# Patient Record
Sex: FEMALE | Race: WHITE | NOT HISPANIC OR LATINO | Employment: UNEMPLOYED | URBAN - METROPOLITAN AREA
[De-identification: names, ages, dates, MRNs, and addresses within clinical notes are randomized per-mention and may not be internally consistent; named-entity substitution may affect disease eponyms.]

---

## 2018-04-01 ENCOUNTER — TRANSFERRED RECORDS (OUTPATIENT)
Dept: HEALTH INFORMATION MANAGEMENT | Facility: CLINIC | Age: 38
End: 2018-04-01

## 2018-04-01 LAB — PAP SMEAR - HIM PATIENT REPORTED: NEGATIVE

## 2021-01-07 ENCOUNTER — TRANSFERRED RECORDS (OUTPATIENT)
Dept: HEALTH INFORMATION MANAGEMENT | Facility: CLINIC | Age: 41
End: 2021-01-07

## 2021-03-09 ENCOUNTER — OFFICE VISIT (OUTPATIENT)
Dept: INTERNAL MEDICINE | Facility: CLINIC | Age: 41
End: 2021-03-09
Payer: COMMERCIAL

## 2021-03-09 VITALS
WEIGHT: 147 LBS | HEART RATE: 72 BPM | DIASTOLIC BLOOD PRESSURE: 62 MMHG | OXYGEN SATURATION: 99 % | SYSTOLIC BLOOD PRESSURE: 96 MMHG | TEMPERATURE: 98.1 F | RESPIRATION RATE: 16 BRPM | HEIGHT: 67 IN | BODY MASS INDEX: 23.07 KG/M2

## 2021-03-09 DIAGNOSIS — G47.00 INSOMNIA, UNSPECIFIED TYPE: ICD-10-CM

## 2021-03-09 DIAGNOSIS — Z83.3 FAMILY HISTORY OF DIABETES MELLITUS: ICD-10-CM

## 2021-03-09 DIAGNOSIS — F33.0 MILD EPISODE OF RECURRENT MAJOR DEPRESSIVE DISORDER (H): ICD-10-CM

## 2021-03-09 DIAGNOSIS — Z11.4 ENCOUNTER FOR SCREENING FOR HIV: ICD-10-CM

## 2021-03-09 DIAGNOSIS — Z11.59 NEED FOR HEPATITIS C SCREENING TEST: ICD-10-CM

## 2021-03-09 DIAGNOSIS — Z00.00 ROUTINE GENERAL MEDICAL EXAMINATION AT A HEALTH CARE FACILITY: Primary | ICD-10-CM

## 2021-03-09 DIAGNOSIS — F41.9 ANXIETY: ICD-10-CM

## 2021-03-09 LAB
ALBUMIN SERPL-MCNC: 3.8 G/DL (ref 3.4–5)
ALP SERPL-CCNC: 49 U/L (ref 40–150)
ALT SERPL W P-5'-P-CCNC: 16 U/L (ref 0–50)
ANION GAP SERPL CALCULATED.3IONS-SCNC: 4 MMOL/L (ref 3–14)
AST SERPL W P-5'-P-CCNC: 15 U/L (ref 0–45)
BASOPHILS # BLD AUTO: 0 10E9/L (ref 0–0.2)
BASOPHILS NFR BLD AUTO: 0.7 %
BILIRUB SERPL-MCNC: 0.5 MG/DL (ref 0.2–1.3)
BUN SERPL-MCNC: 7 MG/DL (ref 7–30)
CALCIUM SERPL-MCNC: 9.1 MG/DL (ref 8.5–10.1)
CHLORIDE SERPL-SCNC: 107 MMOL/L (ref 94–109)
CO2 SERPL-SCNC: 27 MMOL/L (ref 20–32)
CREAT SERPL-MCNC: 0.77 MG/DL (ref 0.52–1.04)
DEPRECATED CALCIDIOL+CALCIFEROL SERPL-MC: 26 UG/L (ref 20–75)
DIFFERENTIAL METHOD BLD: ABNORMAL
EOSINOPHIL # BLD AUTO: 0.2 10E9/L (ref 0–0.7)
EOSINOPHIL NFR BLD AUTO: 4.8 %
ERYTHROCYTE [DISTWIDTH] IN BLOOD BY AUTOMATED COUNT: 11.9 % (ref 10–15)
GFR SERPL CREATININE-BSD FRML MDRD: >90 ML/MIN/{1.73_M2}
GLUCOSE SERPL-MCNC: 95 MG/DL (ref 70–99)
HBA1C MFR BLD: 5.1 % (ref 0–5.6)
HCT VFR BLD AUTO: 36.3 % (ref 35–47)
HCV AB SERPL QL IA: NONREACTIVE
HGB BLD-MCNC: 11.6 G/DL (ref 11.7–15.7)
HIV 1+2 AB+HIV1 P24 AG SERPL QL IA: NONREACTIVE
LYMPHOCYTES # BLD AUTO: 1.5 10E9/L (ref 0.8–5.3)
LYMPHOCYTES NFR BLD AUTO: 35.4 %
MCH RBC QN AUTO: 35.7 PG (ref 26.5–33)
MCHC RBC AUTO-ENTMCNC: 32 G/DL (ref 31.5–36.5)
MCV RBC AUTO: 112 FL (ref 78–100)
MONOCYTES # BLD AUTO: 0.4 10E9/L (ref 0–1.3)
MONOCYTES NFR BLD AUTO: 10.5 %
NEUTROPHILS # BLD AUTO: 2 10E9/L (ref 1.6–8.3)
NEUTROPHILS NFR BLD AUTO: 48.6 %
PLATELET # BLD AUTO: 248 10E9/L (ref 150–450)
POTASSIUM SERPL-SCNC: 3.7 MMOL/L (ref 3.4–5.3)
PROT SERPL-MCNC: 7.3 G/DL (ref 6.8–8.8)
RBC # BLD AUTO: 3.25 10E12/L (ref 3.8–5.2)
SODIUM SERPL-SCNC: 138 MMOL/L (ref 133–144)
TSH SERPL DL<=0.005 MIU/L-ACNC: 1.66 MU/L (ref 0.4–4)
WBC # BLD AUTO: 4.2 10E9/L (ref 4–11)

## 2021-03-09 PROCEDURE — 87389 HIV-1 AG W/HIV-1&-2 AB AG IA: CPT | Performed by: NURSE PRACTITIONER

## 2021-03-09 PROCEDURE — 80050 GENERAL HEALTH PANEL: CPT | Performed by: NURSE PRACTITIONER

## 2021-03-09 PROCEDURE — 82306 VITAMIN D 25 HYDROXY: CPT | Performed by: NURSE PRACTITIONER

## 2021-03-09 PROCEDURE — 86803 HEPATITIS C AB TEST: CPT | Performed by: NURSE PRACTITIONER

## 2021-03-09 PROCEDURE — 99385 PREV VISIT NEW AGE 18-39: CPT | Performed by: NURSE PRACTITIONER

## 2021-03-09 PROCEDURE — 36415 COLL VENOUS BLD VENIPUNCTURE: CPT | Performed by: NURSE PRACTITIONER

## 2021-03-09 PROCEDURE — 83036 HEMOGLOBIN GLYCOSYLATED A1C: CPT | Performed by: NURSE PRACTITIONER

## 2021-03-09 RX ORDER — ZOLPIDEM TARTRATE 10 MG/1
10 TABLET ORAL
COMMUNITY
Start: 2020-11-20 | End: 2021-04-13

## 2021-03-09 RX ORDER — ZOLPIDEM TARTRATE 10 MG/1
10 TABLET ORAL
Qty: 30 TABLET | Refills: 5 | Status: SHIPPED | OUTPATIENT
Start: 2021-03-09 | End: 2021-09-21

## 2021-03-09 SDOH — HEALTH STABILITY: MENTAL HEALTH: HOW MANY STANDARD DRINKS CONTAINING ALCOHOL DO YOU HAVE ON A TYPICAL DAY?: NOT ASKED

## 2021-03-09 SDOH — HEALTH STABILITY: MENTAL HEALTH: HOW OFTEN DO YOU HAVE 6 OR MORE DRINKS ON ONE OCCASION?: NEVER

## 2021-03-09 SDOH — HEALTH STABILITY: MENTAL HEALTH: HOW OFTEN DO YOU HAVE A DRINK CONTAINING ALCOHOL?: NEVER

## 2021-03-09 ASSESSMENT — ENCOUNTER SYMPTOMS: BREAST MASS: 0

## 2021-03-09 ASSESSMENT — MIFFLIN-ST. JEOR: SCORE: 1374.42

## 2021-03-09 ASSESSMENT — PATIENT HEALTH QUESTIONNAIRE - PHQ9
SUM OF ALL RESPONSES TO PHQ QUESTIONS 1-9: 11
SUM OF ALL RESPONSES TO PHQ QUESTIONS 1-9: 11
10. IF YOU CHECKED OFF ANY PROBLEMS, HOW DIFFICULT HAVE THESE PROBLEMS MADE IT FOR YOU TO DO YOUR WORK, TAKE CARE OF THINGS AT HOME, OR GET ALONG WITH OTHER PEOPLE: SOMEWHAT DIFFICULT

## 2021-03-09 NOTE — PROGRESS NOTES
SUBJECTIVE:   CC: Humera Lovell is an 40 year old woman who presents for preventive health visit.       Patient has been advised of split billing requirements and indicates understanding: Yes  Healthy Habits:     Getting at least 3 servings of Calcium per day:  NO    Bi-annual eye exam:  Yes    Dental care twice a year:  Yes    Sleep apnea or symptoms of sleep apnea:  None    Diet:  Regular (no restrictions)    Frequency of exercise:  2-3 days/week    Duration of exercise:  15-30 minutes    Taking medications regularly:  Not Applicable    Medication side effects:  None    PHQ-2 Total Score: 4      Pap smear done on this date: about two years ago .(approximately), by this group: [facility] , results were neg .   4/2018- PAP normal - positive for non 16-18 HPV on review of her my chart records         Today's PHQ-2 Score:   PHQ-2 ( 1999 [employer]) 3/9/2021   Q1: Little interest or pleasure in doing things 2   Q2: Feeling down, depressed or hopeless 2   PHQ-2 Score 4   Q1: Little interest or pleasure in doing things More than half the days   Q2: Feeling down, depressed or hopeless More than half the days   PHQ-2 Score 4       Abuse: Current or Past (Physical, Sexual or Emotional) - NA  Do you feel safe in your environment? Yes    Have you ever done Advance Care Planning? (For example, a Health Directive, POLST, or a discussion with a medical provider or your loved ones about your wishes): No, advance care planning information given to patient to review.  Patient declined advance care planning discussion at this time.    Social History     Tobacco Use     Smoking status: Never Smoker     Smokeless tobacco: Never Used   Substance Use Topics     Alcohol use: Never     Frequency: Never     Binge frequency: Never         Alcohol Use 3/9/2021   Prescreen: >3 drinks/day or >7 drinks/week? Not Applicable       Any new diagnosis of family breast, ovarian, or bowel cancer?      Reviewed orders with patient.   "Reviewed health maintenance and updated orders accordingly -       Breast CA Risk Screening:  No flowsheet data found.        Pertinent mammograms are reviewed under the imaging tab.    History of abnormal Pap smear:      Reviewed and updated as needed this visit by clinical staff  Tobacco  Allergies  Meds     Fam Hx  Soc Hx        Reviewed and updated as needed this visit by Provider   Allergies                  Review of Systems   Breasts:  Negative for tenderness, breast mass and discharge.   Genitourinary: Negative for pelvic pain, vaginal bleeding and vaginal discharge.     Depression   Son autistic - suicidal - in hospital currently     She has had depression in past     Insomnia - uses ambien - works well for her   Tried trazodone and hydroxyzine in past     Will do PAP next visit    OBJECTIVE:   BP 96/62   Pulse 72   Temp 98.1  F (36.7  C) (Oral)   Resp 16   Ht 1.702 m (5' 7\")   Wt 66.7 kg (147 lb)   LMP 01/11/2021 (Exact Date)   SpO2 99%   Breastfeeding No   BMI 23.02 kg/m    Physical Exam  GENERAL: alert and no distress  EYES: Eyes grossly normal to inspection, and conjunctivae and sclerae normal  HENT: ear canals and TM's normal, nose and mouth without ulcers or lesions  NECK: no adenopathy, no asymmetry, masses, or scars and thyroid normal to palpation  RESP: lungs clear to auscultation - no rales, rhonchi or wheezes  CV: regular rate and rhythm, normal S1 S2, no S3 or S4, no murmur, click or rub, no peripheral edema and peripheral pulses strong  ABDOMEN: soft, nontender, no hepatosplenomegaly, no masses and bowel sounds normal  MS: no gross musculoskeletal defects noted, no edema  SKIN: no suspicious lesions or rashes  NEURO: Normal strength and tone, mentation intact and speech normal  PSYCH: mentation appears normal, affect normal/bright    Diagnostic Test Results:  Labs reviewed in Epic  Lab     ASSESSMENT/PLAN:   1. Routine general medical examination at a health care facility    - " Lipid panel reflex to direct LDL Fasting; Future  - Comprehensive metabolic panel (BMP + Alb, Alk Phos, ALT, AST, Total. Bili, TP)  - TSH with free T4 reflex  - CBC with platelets and differential  - HIV Antigen Antibody Combo  - Hepatitis C Screen Reflex to HCV RNA Quant and Genotype    2. Insomnia, unspecified type  ambien works well   - Comprehensive metabolic panel (BMP + Alb, Alk Phos, ALT, AST, Total. Bili, TP)  - TSH with free T4 reflex  - CBC with platelets and differential  - zolpidem (AMBIEN) 10 MG tablet; Take 1 tablet (10 mg) by mouth nightly as needed for sleep  Dispense: 30 tablet; Refill: 5    3. Mild episode of recurrent major depressive disorder (H)  Needs to be on medication   - Comprehensive metabolic panel (BMP + Alb, Alk Phos, ALT, AST, Total. Bili, TP)  - TSH with free T4 reflex  - CBC with platelets and differential  - sertraline (ZOLOFT) 50 MG tablet; Take 1 tablet (50 mg) by mouth daily  Dispense: 30 tablet; Refill: 1  - Vitamin D Deficiency    4. Encounter for screening for HIV    - HIV Antigen Antibody Combo    5. Need for hepatitis C screening test    - Hepatitis C Screen Reflex to HCV RNA Quant and Genotype    6. Anxiety    - sertraline (ZOLOFT) 50 MG tablet; Take 1 tablet (50 mg) by mouth daily  Dispense: 30 tablet; Refill: 1  - Vitamin D Deficiency    7. Family history of diabetes mellitus    - Comprehensive metabolic panel (BMP + Alb, Alk Phos, ALT, AST, Total. Bili, TP)  - Hemoglobin A1c    Patient has been advised of split billing requirements and indicates understanding:   COUNSELING:  Reviewed preventive health counseling, as reflected in patient instructions       Regular exercise       Healthy diet/nutrition       Osteoporosis prevention/bone health       Consider Hep C screening for all patients one time for ages 18-79 years       HIV screeninx in teen years, 1x in adult years, and at intervals if high risk    Estimated body mass index is 23.02 kg/m  as calculated from the  "following:    Height as of this encounter: 1.702 m (5' 7\").    Weight as of this encounter: 66.7 kg (147 lb).        She reports that she has never smoked. She has never used smokeless tobacco.      Counseling Resources:  ATP IV Guidelines  Pooled Cohorts Equation Calculator  Breast Cancer Risk Calculator  BRCA-Related Cancer Risk Assessment: FHS-7 Tool  FRAX Risk Assessment  ICSI Preventive Guidelines  Dietary Guidelines for Americans, 2010  Who[employer]'s MyPlate  ASA Prophylaxis  Lung CA Screening    CHONG Abdalla [facility]  Answers for HPI/ROS submitted by the patient on 3/9/2021   Annual Exam:  If you checked off any problems, how difficult have these problems made it for you to do your work, take care of things at home, or get along with other people?: Somewhat difficult  PHQ9 TOTAL SCORE: 11    "

## 2021-03-10 ASSESSMENT — PATIENT HEALTH QUESTIONNAIRE - PHQ9: SUM OF ALL RESPONSES TO PHQ QUESTIONS 1-9: 11

## 2021-04-03 ENCOUNTER — HEALTH MAINTENANCE LETTER (OUTPATIENT)
Age: 41
End: 2021-04-03

## 2021-04-13 ENCOUNTER — OFFICE VISIT (OUTPATIENT)
Dept: INTERNAL MEDICINE | Facility: CLINIC | Age: 41
End: 2021-04-13
Payer: COMMERCIAL

## 2021-04-13 VITALS
SYSTOLIC BLOOD PRESSURE: 113 MMHG | RESPIRATION RATE: 16 BRPM | BODY MASS INDEX: 22.4 KG/M2 | WEIGHT: 143 LBS | OXYGEN SATURATION: 100 % | HEART RATE: 61 BPM | DIASTOLIC BLOOD PRESSURE: 73 MMHG

## 2021-04-13 DIAGNOSIS — F33.0 MILD EPISODE OF RECURRENT MAJOR DEPRESSIVE DISORDER (H): ICD-10-CM

## 2021-04-13 DIAGNOSIS — R71.8 ELEVATED MCV: ICD-10-CM

## 2021-04-13 DIAGNOSIS — Z23 NEED FOR VACCINATION: ICD-10-CM

## 2021-04-13 DIAGNOSIS — Z00.00 ROUTINE GENERAL MEDICAL EXAMINATION AT A HEALTH CARE FACILITY: ICD-10-CM

## 2021-04-13 DIAGNOSIS — D64.9 ANEMIA, UNSPECIFIED TYPE: ICD-10-CM

## 2021-04-13 DIAGNOSIS — J30.2 SEASONAL ALLERGIC RHINITIS, UNSPECIFIED TRIGGER: ICD-10-CM

## 2021-04-13 DIAGNOSIS — F41.9 ANXIETY: Primary | ICD-10-CM

## 2021-04-13 PROCEDURE — 90715 TDAP VACCINE 7 YRS/> IM: CPT | Performed by: NURSE PRACTITIONER

## 2021-04-13 PROCEDURE — 82607 VITAMIN B-12: CPT | Performed by: NURSE PRACTITIONER

## 2021-04-13 PROCEDURE — 99214 OFFICE O/P EST MOD 30 MIN: CPT | Mod: 25 | Performed by: NURSE PRACTITIONER

## 2021-04-13 PROCEDURE — 36415 COLL VENOUS BLD VENIPUNCTURE: CPT | Performed by: NURSE PRACTITIONER

## 2021-04-13 PROCEDURE — 90471 IMMUNIZATION ADMIN: CPT | Performed by: NURSE PRACTITIONER

## 2021-04-13 PROCEDURE — 80061 LIPID PANEL: CPT | Performed by: NURSE PRACTITIONER

## 2021-04-13 RX ORDER — HYDROXYZINE HYDROCHLORIDE 25 MG/1
25-50 TABLET, FILM COATED ORAL 3 TIMES DAILY PRN
Qty: 90 TABLET | Refills: 1 | Status: SHIPPED | OUTPATIENT
Start: 2021-04-13 | End: 2021-10-22

## 2021-04-13 RX ORDER — FLUTICASONE PROPIONATE 50 MCG
1 SPRAY, SUSPENSION (ML) NASAL DAILY
Qty: 16 G | Refills: 11 | Status: SHIPPED | OUTPATIENT
Start: 2021-04-13 | End: 2022-09-06

## 2021-04-13 ASSESSMENT — ANXIETY QUESTIONNAIRES
7. FEELING AFRAID AS IF SOMETHING AWFUL MIGHT HAPPEN: NOT AT ALL
5. BEING SO RESTLESS THAT IT IS HARD TO SIT STILL: NOT AT ALL
IF YOU CHECKED OFF ANY PROBLEMS ON THIS QUESTIONNAIRE, HOW DIFFICULT HAVE THESE PROBLEMS MADE IT FOR YOU TO DO YOUR WORK, TAKE CARE OF THINGS AT HOME, OR GET ALONG WITH OTHER PEOPLE: SOMEWHAT DIFFICULT
2. NOT BEING ABLE TO STOP OR CONTROL WORRYING: SEVERAL DAYS
1. FEELING NERVOUS, ANXIOUS, OR ON EDGE: MORE THAN HALF THE DAYS
6. BECOMING EASILY ANNOYED OR IRRITABLE: SEVERAL DAYS
GAD7 TOTAL SCORE: 6
3. WORRYING TOO MUCH ABOUT DIFFERENT THINGS: SEVERAL DAYS

## 2021-04-13 ASSESSMENT — PATIENT HEALTH QUESTIONNAIRE - PHQ9
5. POOR APPETITE OR OVEREATING: SEVERAL DAYS
SUM OF ALL RESPONSES TO PHQ QUESTIONS 1-9: 10

## 2021-04-13 NOTE — PROGRESS NOTES
Assessment & Plan     Anxiety  Hydroxyzine worked in past for her   Did not tolerate zoloft  - hydrOXYzine (ATARAX) 25 MG tablet; Take 1-2 tablets (25-50 mg) by mouth 3 times daily as needed for itching    Mild episode of recurrent major depressive disorder (H)  Did not tolerate zoloft   Want to try hydroxyzine   - hydrOXYzine (ATARAX) 25 MG tablet; Take 1-2 tablets (25-50 mg) by mouth 3 times daily as needed for itching    Anemia, unspecified type  Elevated MCV  - Vitamin B12    Routine general medical examination at a health care facility    - Lipid panel reflex to direct LDL Fasting    Elevated MCV    - Vitamin B12    Seasonal allergic rhinitis, unspecified trigger    - fluticasone (FLONASE) 50 MCG/ACT nasal spray; Spray 1 spray into both nostrils daily    Need for vaccination    - TDAP VACCINE (Adacel, Boostrix)  [[id number]]      20 minutes spent on the date of the encounter doing chart review, history and exam, documentation and further activities per the note       Depression Screening Follow Up    PHQ 4/13/2021   PHQ-9 Total Score 10   Q9: Thoughts of better off dead/self-harm past 2 weeks Not at all       Patient Instructions   Lab in [address]    Hydroxyzine 25-50 mg up to 3 times a day  For anxiety     Flonase inhaler daily     Consider Xyzal once daily           Return in about 1 year (around 4/13/2022).    CHONG Abdalla [facility] is a 40 year old who presents for the following health issues     HPI     Here for follow up mental  Health   Did not take zoloft as she felt bad on it - only took one dose and stopped     Last years had hydroxyzine  And worked for her   PHQ 3/9/2021 4/13/2021   PHQ-9 Total Score 11 10   Q9: Thoughts of better off dead/self-harm past 2 weeks Not at all Not at all     CHEYENNE-7 SCORE 4/13/2021   Total Score 6     Seasonal allergies              Review of Systems   Constitutional, HEENT, cardiovascular, pulmonary, GI,  , musculoskeletal, neuro, skin, endocrine and psych systems are negative, except as otherwise noted.      Objective    /73   Pulse 61   Resp 16   Wt 64.9 kg (143 lb)   SpO2 100%   Breastfeeding No   BMI 22.40 kg/m    Body mass index is 22.4 kg/m .  Physical Exam   GENERAL: alert and no distress  RESP: lungs clear to auscultation - no rales, rhonchi or wheezes  CV: regular rate and rhythm, normal S1 S2, no S3 or S4, no murmur, click or rub, no peripheral edema and peripheral pulses strong  ABDOMEN: soft, nontender, no hepatosplenomegaly, no masses and bowel sounds normal  MS: no gross musculoskeletal defects noted, no edema  NEURO: Normal strength and tone, mentation intact and speech normal  PSYCH: mentation appears normal, affect normal/bright

## 2021-04-13 NOTE — PATIENT INSTRUCTIONS
Lab in [address]    Hydroxyzine 25-50 mg up to 3 times a day  For anxiety     Flonase inhaler daily     Consider Xyzal once daily

## 2021-04-14 LAB
CHOLEST SERPL-MCNC: 187 MG/DL
HDLC SERPL-MCNC: 91 MG/DL
LDLC SERPL CALC-MCNC: 86 MG/DL
NONHDLC SERPL-MCNC: 96 MG/DL
TRIGL SERPL-MCNC: 49 MG/DL
VIT B12 SERPL-MCNC: 346 PG/ML (ref 193–986)

## 2021-04-14 ASSESSMENT — ANXIETY QUESTIONNAIRES: GAD7 TOTAL SCORE: 6

## 2021-09-18 ENCOUNTER — HEALTH MAINTENANCE LETTER (OUTPATIENT)
Age: 41
End: 2021-09-18

## 2021-09-20 DIAGNOSIS — G47.00 INSOMNIA, UNSPECIFIED TYPE: ICD-10-CM

## 2021-09-20 RX ORDER — ZOLPIDEM TARTRATE 10 MG/1
10 TABLET ORAL
Qty: 30 TABLET | Refills: 5 | OUTPATIENT
Start: 2021-09-20

## 2021-09-21 RX ORDER — ZOLPIDEM TARTRATE 10 MG/1
TABLET ORAL
Qty: 30 TABLET | Refills: 5 | Status: SHIPPED | OUTPATIENT
Start: 2021-09-21 | End: 2021-10-20

## 2021-10-20 ENCOUNTER — TELEPHONE (OUTPATIENT)
Dept: INTERNAL MEDICINE | Facility: CLINIC | Age: 41
End: 2021-10-20

## 2021-10-20 ENCOUNTER — APPOINTMENT (OUTPATIENT)
Dept: URGENT CARE | Facility: CLINIC | Age: 41
End: 2021-10-20
Payer: COMMERCIAL

## 2021-10-20 DIAGNOSIS — G47.00 INSOMNIA, UNSPECIFIED TYPE: Primary | ICD-10-CM

## 2021-10-20 DIAGNOSIS — F33.0 MILD EPISODE OF RECURRENT MAJOR DEPRESSIVE DISORDER (H): ICD-10-CM

## 2021-10-20 DIAGNOSIS — F41.9 ANXIETY: ICD-10-CM

## 2021-10-20 RX ORDER — ESZOPICLONE 2 MG/1
2 TABLET, FILM COATED ORAL AT BEDTIME
Qty: 30 TABLET | Refills: 1 | Status: SHIPPED | OUTPATIENT
Start: 2021-10-20 | End: 2021-12-22

## 2021-10-20 NOTE — TELEPHONE ENCOUNTER
Patient calls.    She was taking Hydroxyzine had been working well before, but has not taken it for several months. She asks if there is a standing order for this at the pharmacy. Advised patient that Beatriz did include a refill with her last hydroxyzine prescription and to call the pharmacy to have this filled. Patient also reports that Ambien no longer helping with sleep, this then makes her anxiety worse. She is asking if there is something similar to Ambien that she can take at night.    Gisele Johnson RN  [facility]

## 2021-10-22 RX ORDER — HYDROXYZINE HYDROCHLORIDE 25 MG/1
TABLET, FILM COATED ORAL
Qty: 90 TABLET | Refills: 1 | Status: SHIPPED | OUTPATIENT
Start: 2021-10-22 | End: 2023-09-13

## 2021-10-22 NOTE — TELEPHONE ENCOUNTER
Pending Prescriptions:                       Disp   Refills    hydrOXYzine (ATARAX) 25 MG tablet [Pharma*90 tab*1            Sig: TAKE 1 TO 2 TABLETS(25 TO 50 MG) BY MOUTH THREE           TIMES DAILY AS NEEDED FOR ITCHING    Prescription approved per [facility] Refill Protocol.

## 2021-12-22 DIAGNOSIS — G47.00 INSOMNIA, UNSPECIFIED TYPE: ICD-10-CM

## 2021-12-22 RX ORDER — ESZOPICLONE 2 MG/1
TABLET, FILM COATED ORAL
Qty: 30 TABLET | Refills: 5 | Status: SHIPPED | OUTPATIENT
Start: 2021-12-22 | End: 2022-03-29

## 2021-12-22 NOTE — TELEPHONE ENCOUNTER
Routing refill request to provider for review/approval because:  Drug not on the [facility] refill protocol   Abbi Lacy RN, BSN

## 2022-01-10 ENCOUNTER — TELEPHONE (OUTPATIENT)
Dept: INTERNAL MEDICINE | Facility: CLINIC | Age: 42
End: 2022-01-10
Payer: COMMERCIAL

## 2022-01-10 NOTE — TELEPHONE ENCOUNTER
"Calling with would on R thumb. Patient states a week ago she scratched it and now it is not healing and growing larger. It is swollen, denies red but it black/dried blood. It is a \"little bit warm to touch nothing out of ordinary\". Painful when bumped. Pain does not wake her at night denies fever.     Best number to call  [phone]    She is asking if she needs an appt   "

## 2022-01-11 NOTE — TELEPHONE ENCOUNTER
Left voice message for patient to call back to speak to a nurse.     Gislee Johnson RN  [facility]

## 2022-01-12 ENCOUNTER — MYC MEDICAL ADVICE (OUTPATIENT)
Dept: INTERNAL MEDICINE | Facility: CLINIC | Age: 42
End: 2022-01-12
Payer: COMMERCIAL

## 2022-01-13 NOTE — TELEPHONE ENCOUNTER
Patient read [employer] message and responded that she would make appointment or e-visit.    Gisele Johnson RN  [facility]

## 2022-03-27 ENCOUNTER — HOSPITAL ENCOUNTER (EMERGENCY)
Facility: CLINIC | Age: 42
Discharge: HOME | End: 2022-03-28
Attending: EMERGENCY MEDICINE | Admitting: EMERGENCY MEDICINE
Payer: COMMERCIAL

## 2022-03-27 DIAGNOSIS — R11.0 NAUSEA: ICD-10-CM

## 2022-03-27 DIAGNOSIS — R11.10 VOMITING AND DIARRHEA: ICD-10-CM

## 2022-03-27 DIAGNOSIS — R19.7 VOMITING AND DIARRHEA: ICD-10-CM

## 2022-03-27 LAB
ALBUMIN SERPL-MCNC: 3.8 G/DL (ref 3.4–5)
ALP SERPL-CCNC: 57 U/L (ref 40–150)
ALT SERPL W P-5'-P-CCNC: 37 U/L (ref 0–50)
ANION GAP SERPL CALCULATED.3IONS-SCNC: 5 MMOL/L (ref 3–14)
AST SERPL W P-5'-P-CCNC: 37 U/L (ref 0–45)
BASOPHILS # BLD AUTO: 0 10E3/UL (ref 0–0.2)
BASOPHILS NFR BLD AUTO: 0 %
BILIRUB SERPL-MCNC: 0.6 MG/DL (ref 0.2–1.3)
BUN SERPL-MCNC: 11 MG/DL (ref 7–30)
CALCIUM SERPL-MCNC: 9.1 MG/DL (ref 8.5–10.1)
CHLORIDE BLD-SCNC: 99 MMOL/L (ref 94–109)
CO2 SERPL-SCNC: 29 MMOL/L (ref 20–32)
CREAT SERPL-MCNC: 0.61 MG/DL (ref 0.52–1.04)
EOSINOPHIL # BLD AUTO: 0 10E3/UL (ref 0–0.7)
EOSINOPHIL NFR BLD AUTO: 0 %
ERYTHROCYTE [DISTWIDTH] IN BLOOD BY AUTOMATED COUNT: 12.6 % (ref 10–15)
GFR SERPL CREATININE-BSD FRML MDRD: >90 ML/MIN/1.73M2
GLUCOSE BLD-MCNC: 124 MG/DL (ref 70–99)
HCT VFR BLD AUTO: 35.5 % (ref 35–47)
HGB BLD-MCNC: 11.9 G/DL (ref 11.7–15.7)
HOLD SPECIMEN: NORMAL
HOLD SPECIMEN: NORMAL
IMM GRANULOCYTES # BLD: 0 10E3/UL
IMM GRANULOCYTES NFR BLD: 0 %
LIPASE SERPL-CCNC: 70 U/L (ref 73–393)
LYMPHOCYTES # BLD AUTO: 0.9 10E3/UL (ref 0.8–5.3)
LYMPHOCYTES NFR BLD AUTO: 10 %
MCH RBC QN AUTO: 34 PG (ref 26.5–33)
MCHC RBC AUTO-ENTMCNC: 33.5 G/DL (ref 31.5–36.5)
MCV RBC AUTO: 101 FL (ref 78–100)
MONOCYTES # BLD AUTO: 1 10E3/UL (ref 0–1.3)
MONOCYTES NFR BLD AUTO: 10 %
NEUTROPHILS # BLD AUTO: 7.4 10E3/UL (ref 1.6–8.3)
NEUTROPHILS NFR BLD AUTO: 80 %
NRBC # BLD AUTO: 0 10E3/UL
NRBC BLD AUTO-RTO: 0 /100
PLATELET # BLD AUTO: 297 10E3/UL (ref 150–450)
POTASSIUM BLD-SCNC: 3 MMOL/L (ref 3.4–5.3)
PROT SERPL-MCNC: 7.7 G/DL (ref 6.8–8.8)
RBC # BLD AUTO: 3.5 10E6/UL (ref 3.8–5.2)
SODIUM SERPL-SCNC: 133 MMOL/L (ref 133–144)
WBC # BLD AUTO: 9.3 10E3/UL (ref 4–11)

## 2022-03-27 PROCEDURE — 99285 EMERGENCY DEPT VISIT HI MDM: CPT | Mod: 25

## 2022-03-27 PROCEDURE — 96375 TX/PRO/DX INJ NEW DRUG ADDON: CPT

## 2022-03-27 PROCEDURE — 96374 THER/PROPH/DIAG INJ IV PUSH: CPT

## 2022-03-27 PROCEDURE — 250N000009 HC RX 250: Performed by: EMERGENCY MEDICINE

## 2022-03-27 PROCEDURE — 83690 ASSAY OF LIPASE: CPT | Performed by: EMERGENCY MEDICINE

## 2022-03-27 PROCEDURE — 250N000013 HC RX MED GY IP 250 OP 250 PS 637: Performed by: EMERGENCY MEDICINE

## 2022-03-27 PROCEDURE — 36415 COLL VENOUS BLD VENIPUNCTURE: CPT | Performed by: EMERGENCY MEDICINE

## 2022-03-27 PROCEDURE — 250N000011 HC RX IP 250 OP 636: Performed by: EMERGENCY MEDICINE

## 2022-03-27 PROCEDURE — 96361 HYDRATE IV INFUSION ADD-ON: CPT

## 2022-03-27 PROCEDURE — 80053 COMPREHEN METABOLIC PANEL: CPT | Performed by: EMERGENCY MEDICINE

## 2022-03-27 PROCEDURE — 258N000003 HC RX IP 258 OP 636: Performed by: EMERGENCY MEDICINE

## 2022-03-27 PROCEDURE — 85025 COMPLETE CBC W/AUTO DIFF WBC: CPT | Performed by: EMERGENCY MEDICINE

## 2022-03-27 RX ORDER — ONDANSETRON 2 MG/ML
4 INJECTION INTRAMUSCULAR; INTRAVENOUS ONCE
Status: COMPLETED | OUTPATIENT
Start: 2022-03-27 | End: 2022-03-27

## 2022-03-27 RX ORDER — HYDROMORPHONE HYDROCHLORIDE 1 MG/ML
0.5 INJECTION, SOLUTION INTRAMUSCULAR; INTRAVENOUS; SUBCUTANEOUS
Status: COMPLETED | OUTPATIENT
Start: 2022-03-27 | End: 2022-03-27

## 2022-03-27 RX ORDER — POTASSIUM CHLORIDE 1.5 G/1.58G
40 POWDER, FOR SOLUTION ORAL ONCE
Status: COMPLETED | OUTPATIENT
Start: 2022-03-27 | End: 2022-03-27

## 2022-03-27 RX ADMIN — LIDOCAINE HYDROCHLORIDE 30 ML: 20 SOLUTION ORAL; TOPICAL at 22:55

## 2022-03-27 RX ADMIN — ONDANSETRON 4 MG: 2 INJECTION INTRAMUSCULAR; INTRAVENOUS at 22:02

## 2022-03-27 RX ADMIN — POTASSIUM CHLORIDE 40 MEQ: 1.5 POWDER, FOR SOLUTION ORAL at 23:52

## 2022-03-27 RX ADMIN — HYDROMORPHONE HYDROCHLORIDE 0.5 MG: 1 INJECTION, SOLUTION INTRAMUSCULAR; INTRAVENOUS; SUBCUTANEOUS at 23:14

## 2022-03-27 RX ADMIN — SODIUM CHLORIDE 1000 ML: 9 INJECTION, SOLUTION INTRAVENOUS at 22:17

## 2022-03-28 VITALS
DIASTOLIC BLOOD PRESSURE: 82 MMHG | HEART RATE: 74 BPM | SYSTOLIC BLOOD PRESSURE: 137 MMHG | RESPIRATION RATE: 16 BRPM | OXYGEN SATURATION: 100 % | TEMPERATURE: 97.5 F

## 2022-03-28 RX ORDER — ONDANSETRON 4 MG/1
TABLET, ORALLY DISINTEGRATING ORAL
Qty: 10 TABLET | Refills: 0 | Status: SHIPPED | OUTPATIENT
Start: 2022-03-28 | End: 2022-03-29
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
## 2022-03-28 NOTE — ED TRIAGE NOTES
N/V/D x 3 days.  Pt also reports epigastric pain.  Pt just returned from vacation in [place].  Dry heaving in triage.

## 2022-03-29 ENCOUNTER — HOSPITAL ENCOUNTER (INPATIENT)
Facility: CLINIC | Age: 42
LOS: 3 days | Discharge: HOME | End: 2022-04-01
Attending: EMERGENCY MEDICINE | Admitting: INTERNAL MEDICINE
Payer: COMMERCIAL

## 2022-03-29 ENCOUNTER — APPOINTMENT (OUTPATIENT)
Dept: GENERAL RADIOLOGY | Facility: CLINIC | Age: 42
End: 2022-03-29
Attending: EMERGENCY MEDICINE
Payer: COMMERCIAL

## 2022-03-29 DIAGNOSIS — R10.13 EPIGASTRIC PAIN: ICD-10-CM

## 2022-03-29 DIAGNOSIS — R93.1 ABNORMAL ECHOCARDIOGRAM: Primary | ICD-10-CM

## 2022-03-29 DIAGNOSIS — K29.50 CHRONIC GASTRITIS, PRESENCE OF BLEEDING UNSPECIFIED, UNSPECIFIED GASTRITIS TYPE: ICD-10-CM

## 2022-03-29 DIAGNOSIS — I40.1 ACUTE IDIOPATHIC MYOCARDITIS: ICD-10-CM

## 2022-03-29 DIAGNOSIS — I21.4 NSTEMI (NON-ST ELEVATED MYOCARDIAL INFARCTION) (H): ICD-10-CM

## 2022-03-29 DIAGNOSIS — R11.2 INTRACTABLE VOMITING WITH NAUSEA, UNSPECIFIED VOMITING TYPE: ICD-10-CM

## 2022-03-29 DIAGNOSIS — R79.89 ELEVATED TROPONIN: ICD-10-CM

## 2022-03-29 LAB
ALBUMIN SERPL-MCNC: 3.1 G/DL (ref 3.4–5)
ALBUMIN UR-MCNC: NEGATIVE MG/DL
ALP SERPL-CCNC: 62 U/L (ref 40–150)
ALT SERPL W P-5'-P-CCNC: 40 U/L (ref 0–50)
ANION GAP SERPL CALCULATED.3IONS-SCNC: 6 MMOL/L (ref 3–14)
APPEARANCE UR: CLEAR
AST SERPL W P-5'-P-CCNC: 29 U/L (ref 0–45)
BASOPHILS # BLD MANUAL: 0 10E3/UL (ref 0–0.2)
BASOPHILS NFR BLD MANUAL: 0 %
BILIRUB SERPL-MCNC: 0.6 MG/DL (ref 0.2–1.3)
BILIRUB UR QL STRIP: NEGATIVE
BUN SERPL-MCNC: 5 MG/DL (ref 7–30)
CALCIUM SERPL-MCNC: 8.1 MG/DL (ref 8.5–10.1)
CHLORIDE BLD-SCNC: 100 MMOL/L (ref 94–109)
CO2 SERPL-SCNC: 28 MMOL/L (ref 20–32)
COLOR UR AUTO: ABNORMAL
CREAT SERPL-MCNC: 0.68 MG/DL (ref 0.52–1.04)
CRP SERPL-MCNC: 21.1 MG/L (ref 0–8)
EOSINOPHIL # BLD MANUAL: 0.1 10E3/UL (ref 0–0.7)
EOSINOPHIL NFR BLD MANUAL: 2 %
ERYTHROCYTE [DISTWIDTH] IN BLOOD BY AUTOMATED COUNT: 11.7 % (ref 10–15)
ERYTHROCYTE [SEDIMENTATION RATE] IN BLOOD BY WESTERGREN METHOD: 13 MM/HR (ref 0–20)
GFR SERPL CREATININE-BSD FRML MDRD: >90 ML/MIN/1.73M2
GLUCOSE BLD-MCNC: 107 MG/DL (ref 70–99)
GLUCOSE UR STRIP-MCNC: NEGATIVE MG/DL
HCG SERPL QL: NEGATIVE
HCT VFR BLD AUTO: 43.3 % (ref 35–47)
HGB BLD-MCNC: 15 G/DL (ref 11.7–15.7)
HGB UR QL STRIP: ABNORMAL
KETONES UR STRIP-MCNC: 10 MG/DL
LACTATE SERPL-SCNC: 1.6 MMOL/L (ref 0.7–2)
LEUKOCYTE ESTERASE UR QL STRIP: NEGATIVE
LIPASE SERPL-CCNC: 80 U/L (ref 73–393)
LYMPHOCYTES # BLD MANUAL: 1.2 10E3/UL (ref 0.8–5.3)
LYMPHOCYTES NFR BLD MANUAL: 21 %
MAGNESIUM SERPL-MCNC: 2.1 MG/DL (ref 1.6–2.3)
MCH RBC QN AUTO: 34.8 PG (ref 26.5–33)
MCHC RBC AUTO-ENTMCNC: 34.6 G/DL (ref 31.5–36.5)
MCV RBC AUTO: 101 FL (ref 78–100)
MONOCYTES # BLD MANUAL: 0.5 10E3/UL (ref 0–1.3)
MONOCYTES NFR BLD MANUAL: 8 %
MUCOUS THREADS #/AREA URNS LPF: PRESENT /LPF
NEUTROPHILS # BLD MANUAL: 4.1 10E3/UL (ref 1.6–8.3)
NEUTROPHILS NFR BLD MANUAL: 69 %
NITRATE UR QL: NEGATIVE
PH UR STRIP: 7.5 [PH] (ref 5–7)
PLAT MORPH BLD: NORMAL
PLATELET # BLD AUTO: 316 10E3/UL (ref 150–450)
POTASSIUM BLD-SCNC: 2.9 MMOL/L (ref 3.4–5.3)
POTASSIUM BLD-SCNC: 3.1 MMOL/L (ref 3.4–5.3)
PROT SERPL-MCNC: 7 G/DL (ref 6.8–8.8)
RBC # BLD AUTO: 4.31 10E6/UL (ref 3.8–5.2)
RBC MORPH BLD: NORMAL
RBC URINE: 5 /HPF
SARS-COV-2 RNA RESP QL NAA+PROBE: NEGATIVE
SODIUM SERPL-SCNC: 134 MMOL/L (ref 133–144)
SP GR UR STRIP: 1.01 (ref 1–1.03)
SQUAMOUS EPITHELIAL: 6 /HPF
TROPONIN I SERPL HS-MCNC: 2186 NG/L
TROPONIN I SERPL HS-MCNC: 2345 NG/L
UFH PPP CHRO-ACNC: 0.49 IU/ML
UROBILINOGEN UR STRIP-MCNC: NORMAL MG/DL
WBC # BLD AUTO: 5.9 10E3/UL (ref 4–11)
WBC URINE: 3 /HPF

## 2022-03-29 PROCEDURE — 84703 CHORIONIC GONADOTROPIN ASSAY: CPT | Performed by: EMERGENCY MEDICINE

## 2022-03-29 PROCEDURE — 99223 1ST HOSP IP/OBS HIGH 75: CPT | Mod: AI | Performed by: PHYSICIAN ASSISTANT

## 2022-03-29 PROCEDURE — C9803 HOPD COVID-19 SPEC COLLECT: HCPCS

## 2022-03-29 PROCEDURE — 250N000009 HC RX 250: Performed by: EMERGENCY MEDICINE

## 2022-03-29 PROCEDURE — 86140 C-REACTIVE PROTEIN: CPT | Performed by: PHYSICIAN ASSISTANT

## 2022-03-29 PROCEDURE — 250N000011 HC RX IP 250 OP 636: Performed by: EMERGENCY MEDICINE

## 2022-03-29 PROCEDURE — 96365 THER/PROPH/DIAG IV INF INIT: CPT

## 2022-03-29 PROCEDURE — 83605 ASSAY OF LACTIC ACID: CPT | Performed by: EMERGENCY MEDICINE

## 2022-03-29 PROCEDURE — 36415 COLL VENOUS BLD VENIPUNCTURE: CPT | Performed by: EMERGENCY MEDICINE

## 2022-03-29 PROCEDURE — 93005 ELECTROCARDIOGRAM TRACING: CPT | Mod: 76

## 2022-03-29 PROCEDURE — 83690 ASSAY OF LIPASE: CPT | Performed by: EMERGENCY MEDICINE

## 2022-03-29 PROCEDURE — 81001 URINALYSIS AUTO W/SCOPE: CPT | Performed by: EMERGENCY MEDICINE

## 2022-03-29 PROCEDURE — 83735 ASSAY OF MAGNESIUM: CPT | Performed by: PHYSICIAN ASSISTANT

## 2022-03-29 PROCEDURE — 258N000003 HC RX IP 258 OP 636: Performed by: PHYSICIAN ASSISTANT

## 2022-03-29 PROCEDURE — 85652 RBC SED RATE AUTOMATED: CPT | Performed by: PHYSICIAN ASSISTANT

## 2022-03-29 PROCEDURE — 96366 THER/PROPH/DIAG IV INF ADDON: CPT

## 2022-03-29 PROCEDURE — 84484 ASSAY OF TROPONIN QUANT: CPT | Performed by: EMERGENCY MEDICINE

## 2022-03-29 PROCEDURE — 85027 COMPLETE CBC AUTOMATED: CPT | Performed by: EMERGENCY MEDICINE

## 2022-03-29 PROCEDURE — C9113 INJ PANTOPRAZOLE SODIUM, VIA: HCPCS | Performed by: PHYSICIAN ASSISTANT

## 2022-03-29 PROCEDURE — 85520 HEPARIN ASSAY: CPT | Performed by: PHYSICIAN ASSISTANT

## 2022-03-29 PROCEDURE — 87040 BLOOD CULTURE FOR BACTERIA: CPT | Performed by: EMERGENCY MEDICINE

## 2022-03-29 PROCEDURE — 87086 URINE CULTURE/COLONY COUNT: CPT | Performed by: PHYSICIAN ASSISTANT

## 2022-03-29 PROCEDURE — 250N000009 HC RX 250: Performed by: PHYSICIAN ASSISTANT

## 2022-03-29 PROCEDURE — 120N000001 HC R&B MED SURG/OB

## 2022-03-29 PROCEDURE — 96375 TX/PRO/DX INJ NEW DRUG ADDON: CPT

## 2022-03-29 PROCEDURE — 36415 COLL VENOUS BLD VENIPUNCTURE: CPT | Performed by: PHYSICIAN ASSISTANT

## 2022-03-29 PROCEDURE — 93005 ELECTROCARDIOGRAM TRACING: CPT

## 2022-03-29 PROCEDURE — 71046 X-RAY EXAM CHEST 2 VIEWS: CPT

## 2022-03-29 PROCEDURE — 87635 SARS-COV-2 COVID-19 AMP PRB: CPT | Performed by: EMERGENCY MEDICINE

## 2022-03-29 PROCEDURE — 250N000013 HC RX MED GY IP 250 OP 250 PS 637: Performed by: PHYSICIAN ASSISTANT

## 2022-03-29 PROCEDURE — 84132 ASSAY OF SERUM POTASSIUM: CPT | Performed by: PHYSICIAN ASSISTANT

## 2022-03-29 PROCEDURE — 250N000013 HC RX MED GY IP 250 OP 250 PS 637: Performed by: EMERGENCY MEDICINE

## 2022-03-29 PROCEDURE — 96376 TX/PRO/DX INJ SAME DRUG ADON: CPT

## 2022-03-29 PROCEDURE — 99285 EMERGENCY DEPT VISIT HI MDM: CPT | Mod: 25

## 2022-03-29 PROCEDURE — 84484 ASSAY OF TROPONIN QUANT: CPT | Performed by: PHYSICIAN ASSISTANT

## 2022-03-29 PROCEDURE — 80053 COMPREHEN METABOLIC PANEL: CPT | Performed by: EMERGENCY MEDICINE

## 2022-03-29 PROCEDURE — 250N000011 HC RX IP 250 OP 636: Performed by: PHYSICIAN ASSISTANT

## 2022-03-29 PROCEDURE — 250N000013 HC RX MED GY IP 250 OP 250 PS 637

## 2022-03-29 PROCEDURE — 96361 HYDRATE IV INFUSION ADD-ON: CPT

## 2022-03-29 PROCEDURE — 258N000003 HC RX IP 258 OP 636: Performed by: EMERGENCY MEDICINE

## 2022-03-29 RX ORDER — LORAZEPAM 0.5 MG/1
0.5 TABLET ORAL EVERY 6 HOURS PRN
Status: DISCONTINUED | OUTPATIENT
Start: 2022-03-29 | End: 2022-04-01 | Stop reason: HOSPADM

## 2022-03-29 RX ORDER — HEPARIN SODIUM 10000 [USP'U]/100ML
0-5000 INJECTION, SOLUTION INTRAVENOUS CONTINUOUS
Status: DISCONTINUED | OUTPATIENT
Start: 2022-03-29 | End: 2022-03-30 | Stop reason: CLARIF

## 2022-03-29 RX ORDER — NALOXONE HYDROCHLORIDE 0.4 MG/ML
0.2 INJECTION, SOLUTION INTRAMUSCULAR; INTRAVENOUS; SUBCUTANEOUS
Status: DISCONTINUED | OUTPATIENT
Start: 2022-03-29 | End: 2022-04-01 | Stop reason: HOSPADM

## 2022-03-29 RX ORDER — CARVEDILOL 3.12 MG/1
1.56 TABLET, FILM COATED ORAL 2 TIMES DAILY WITH MEALS
Status: DISCONTINUED | OUTPATIENT
Start: 2022-03-29 | End: 2022-03-29

## 2022-03-29 RX ORDER — ASPIRIN 81 MG/1
81 TABLET, CHEWABLE ORAL DAILY
Status: DISCONTINUED | OUTPATIENT
Start: 2022-03-30 | End: 2022-04-01 | Stop reason: CLARIF

## 2022-03-29 RX ORDER — ASPIRIN 325 MG
325 TABLET ORAL ONCE
Status: COMPLETED | OUTPATIENT
Start: 2022-03-29 | End: 2022-03-29

## 2022-03-29 RX ORDER — NALOXONE HYDROCHLORIDE 0.4 MG/ML
0.4 INJECTION, SOLUTION INTRAMUSCULAR; INTRAVENOUS; SUBCUTANEOUS
Status: DISCONTINUED | OUTPATIENT
Start: 2022-03-29 | End: 2022-04-01 | Stop reason: HOSPADM

## 2022-03-29 RX ORDER — KETOROLAC TROMETHAMINE 15 MG/ML
15 INJECTION, SOLUTION INTRAMUSCULAR; INTRAVENOUS ONCE
Status: COMPLETED | OUTPATIENT
Start: 2022-03-29 | End: 2022-03-29

## 2022-03-29 RX ORDER — MELATONIN 5 MG
5 TABLET,CHEWABLE ORAL AT BEDTIME
COMMUNITY
End: 2023-09-13

## 2022-03-29 RX ORDER — POTASSIUM CHLORIDE 1500 MG/1
40 TABLET, EXTENDED RELEASE ORAL ONCE
Status: COMPLETED | OUTPATIENT
Start: 2022-03-29 | End: 2022-03-29

## 2022-03-29 RX ORDER — ACETAMINOPHEN 650 MG/1
650 SUPPOSITORY RECTAL EVERY 6 HOURS PRN
Status: DISCONTINUED | OUTPATIENT
Start: 2022-03-29 | End: 2022-04-01 | Stop reason: HOSPADM

## 2022-03-29 RX ORDER — SUCRALFATE 1 G/1
1 TABLET ORAL 4 TIMES DAILY PRN
Qty: 60 TABLET | Refills: 0 | Status: SHIPPED | OUTPATIENT
Start: 2022-03-29 | End: 2022-03-29

## 2022-03-29 RX ORDER — POTASSIUM CHLORIDE 1.5 G/1.58G
40 POWDER, FOR SOLUTION ORAL ONCE
Status: DISCONTINUED | OUTPATIENT
Start: 2022-03-29 | End: 2022-03-29

## 2022-03-29 RX ORDER — CARVEDILOL 3.12 MG/1
3.12 TABLET ORAL 2 TIMES DAILY WITH MEALS
Status: DISCONTINUED | OUTPATIENT
Start: 2022-03-30 | End: 2022-04-01 | Stop reason: HOSPADM

## 2022-03-29 RX ORDER — ONDANSETRON 4 MG/1
4 TABLET, ORALLY DISINTEGRATING ORAL EVERY 6 HOURS PRN
Status: DISCONTINUED | OUTPATIENT
Start: 2022-03-29 | End: 2022-04-01 | Stop reason: HOSPADM

## 2022-03-29 RX ORDER — DROPERIDOL 2.5 MG/ML
2.5 INJECTION, SOLUTION INTRAMUSCULAR; INTRAVENOUS ONCE
Status: COMPLETED | OUTPATIENT
Start: 2022-03-29 | End: 2022-03-29

## 2022-03-29 RX ORDER — DICYCLOMINE HCL 20 MG
20 TABLET ORAL EVERY 8 HOURS PRN
Qty: 20 TABLET | Refills: 0 | Status: SHIPPED | OUTPATIENT
Start: 2022-03-29 | End: 2022-03-29

## 2022-03-29 RX ORDER — PROCHLORPERAZINE 25 MG
25 SUPPOSITORY, RECTAL RECTAL EVERY 12 HOURS PRN
Status: DISCONTINUED | OUTPATIENT
Start: 2022-03-29 | End: 2022-04-01 | Stop reason: HOSPADM

## 2022-03-29 RX ORDER — NITROGLYCERIN 0.4 MG/1
0.4 TABLET SUBLINGUAL EVERY 5 MIN PRN
Status: DISCONTINUED | OUTPATIENT
Start: 2022-03-29 | End: 2022-04-01 | Stop reason: HOSPADM

## 2022-03-29 RX ORDER — ONDANSETRON 2 MG/ML
4 INJECTION INTRAMUSCULAR; INTRAVENOUS EVERY 6 HOURS PRN
Status: DISCONTINUED | OUTPATIENT
Start: 2022-03-29 | End: 2022-04-01 | Stop reason: HOSPADM

## 2022-03-29 RX ORDER — PROCHLORPERAZINE MALEATE 10 MG
10 TABLET ORAL EVERY 6 HOURS PRN
Status: DISCONTINUED | OUTPATIENT
Start: 2022-03-29 | End: 2022-04-01 | Stop reason: HOSPADM

## 2022-03-29 RX ORDER — ACETAMINOPHEN 325 MG/1
650 TABLET ORAL EVERY 6 HOURS PRN
Status: DISCONTINUED | OUTPATIENT
Start: 2022-03-29 | End: 2022-04-01 | Stop reason: HOSPADM

## 2022-03-29 RX ORDER — HYDRALAZINE HYDROCHLORIDE 20 MG/ML
10 INJECTION INTRAMUSCULAR; INTRAVENOUS EVERY 4 HOURS PRN
Status: DISCONTINUED | OUTPATIENT
Start: 2022-03-29 | End: 2022-04-01 | Stop reason: HOSPADM

## 2022-03-29 RX ORDER — POTASSIUM CHLORIDE 1500 MG/1
20 TABLET, EXTENDED RELEASE ORAL ONCE
Status: COMPLETED | OUTPATIENT
Start: 2022-03-30 | End: 2022-03-30

## 2022-03-29 RX ORDER — MORPHINE SULFATE 4 MG/ML
2 INJECTION, SOLUTION INTRAMUSCULAR; INTRAVENOUS
Status: DISCONTINUED | OUTPATIENT
Start: 2022-03-29 | End: 2022-04-01 | Stop reason: HOSPADM

## 2022-03-29 RX ORDER — LIDOCAINE 40 MG/G
CREAM TOPICAL
Status: DISCONTINUED | OUTPATIENT
Start: 2022-03-29 | End: 2022-03-29

## 2022-03-29 RX ORDER — ONDANSETRON 4 MG/1
4 TABLET, ORALLY DISINTEGRATING ORAL EVERY 8 HOURS PRN
Qty: 10 TABLET | Refills: 0 | Status: SHIPPED | OUTPATIENT
Start: 2022-03-29 | End: 2022-03-29

## 2022-03-29 RX ORDER — SIMVASTATIN 5 MG
5 TABLET ORAL EVERY EVENING
Status: DISCONTINUED | OUTPATIENT
Start: 2022-03-29 | End: 2022-04-01 | Stop reason: CLARIF

## 2022-03-29 RX ORDER — LORAZEPAM 2 MG/ML
0.5 INJECTION INTRAMUSCULAR EVERY 6 HOURS PRN
Status: DISCONTINUED | OUTPATIENT
Start: 2022-03-29 | End: 2022-04-01 | Stop reason: HOSPADM

## 2022-03-29 RX ORDER — DEXTROSE MONOHYDRATE, SODIUM CHLORIDE, AND POTASSIUM CHLORIDE 50; 1.49; 4.5 G/1000ML; G/1000ML; G/1000ML
INJECTION, SOLUTION INTRAVENOUS CONTINUOUS
Status: DISCONTINUED | OUTPATIENT
Start: 2022-03-29 | End: 2022-04-01

## 2022-03-29 RX ORDER — SCOLOPAMINE TRANSDERMAL SYSTEM 1 MG/1
1 PATCH, EXTENDED RELEASE TRANSDERMAL
Status: DISCONTINUED | OUTPATIENT
Start: 2022-03-29 | End: 2022-04-01 | Stop reason: HOSPADM

## 2022-03-29 RX ADMIN — SIMVASTATIN 5 MG: 5 TABLET, FILM COATED ORAL at 21:45

## 2022-03-29 RX ADMIN — PANTOPRAZOLE SODIUM 40 MG: 40 INJECTION, POWDER, FOR SOLUTION INTRAVENOUS at 21:01

## 2022-03-29 RX ADMIN — LORAZEPAM 0.5 MG: 2 INJECTION INTRAMUSCULAR; INTRAVENOUS at 21:44

## 2022-03-29 RX ADMIN — DEXTROSE AND SODIUM CHLORIDE 1000 ML: 5; 900 INJECTION, SOLUTION INTRAVENOUS at 14:28

## 2022-03-29 RX ADMIN — SCOPALAMINE 1 PATCH: 1 PATCH, EXTENDED RELEASE TRANSDERMAL at 21:00

## 2022-03-29 RX ADMIN — CARVEDILOL 1.56 MG: 3.12 TABLET, FILM COATED ORAL at 21:45

## 2022-03-29 RX ADMIN — DROPERIDOL 2.5 MG: 2.5 INJECTION, SOLUTION INTRAMUSCULAR; INTRAVENOUS at 13:38

## 2022-03-29 RX ADMIN — ASPIRIN 325 MG ORAL TABLET 325 MG: 325 PILL ORAL at 16:55

## 2022-03-29 RX ADMIN — POTASSIUM CHLORIDE 40 MEQ: 1500 TABLET, EXTENDED RELEASE ORAL at 22:20

## 2022-03-29 RX ADMIN — KETOROLAC TROMETHAMINE 15 MG: 15 INJECTION, SOLUTION INTRAMUSCULAR; INTRAVENOUS at 14:54

## 2022-03-29 RX ADMIN — SODIUM CHLORIDE 1000 ML: 9 INJECTION, SOLUTION INTRAVENOUS at 13:35

## 2022-03-29 RX ADMIN — LIDOCAINE HYDROCHLORIDE 30 ML: 20 SOLUTION ORAL; TOPICAL at 15:30

## 2022-03-29 RX ADMIN — POTASSIUM CHLORIDE, DEXTROSE MONOHYDRATE AND SODIUM CHLORIDE: 150; 5; 450 INJECTION, SOLUTION INTRAVENOUS at 21:45

## 2022-03-29 RX ADMIN — HEPARIN SODIUM AND DEXTROSE 780 UNITS/HR: 10000; 5 INJECTION INTRAVENOUS at 16:53

## 2022-03-29 RX ADMIN — DROPERIDOL 2.5 MG: 2.5 INJECTION, SOLUTION INTRAMUSCULAR; INTRAVENOUS at 17:25

## 2022-03-29 RX ADMIN — FAMOTIDINE 20 MG: 10 INJECTION, SOLUTION INTRAVENOUS at 15:29

## 2022-03-29 ASSESSMENT — ENCOUNTER SYMPTOMS
DIZZINESS: 0
SHORTNESS OF BREATH: 0
DYSURIA: 0
ABDOMINAL PAIN: 1
FLANK PAIN: 0
VOMITING: 1
BACK PAIN: 0
DIARRHEA: 0
WEAKNESS: 1
FEVER: 0
NAUSEA: 1

## 2022-03-29 ASSESSMENT — ACTIVITIES OF DAILY LIVING (ADL)
ADLS_ACUITY_SCORE: 12

## 2022-03-29 NOTE — Clinical Note
Prepped: right groin and right radial. Prepped with: ChloraPrep. The patient was draped. .Pre-procedure site marking:Insertion site not predetermined

## 2022-03-29 NOTE — PHARMACY-ADMISSION MEDICATION HISTORY
Admission medication history interview status for this patient is complete. See [facility] admission navigator for allergy information, prior to admission medications and immunization status.     Medication history interview done, indicate source(s): Patient  Medication history resources (including written lists, pill bottles, clinic record): SureScripts and Care Everywhere  Pharmacy: [facility]. [facility] for discharge    Changes made to PTA medication list:  Added: melatonin  Changed: flonase daily --> daily prn.  Reported as Not Taking: -  Removed: Lunesta and Zofran    Actions taken by pharmacist (provider contacted, etc):None     Additional medication history information:None    Medication reconciliation/reorder completed by provider prior to medication history?  N   (Y/N)       Prior to Admission medications    Medication Sig Last Dose Taking? Auth Provider   fluticasone (FLONASE) 50 MCG/ACT nasal spray Spray 1 spray into both nostrils daily  Patient taking differently: Spray 1 spray into both nostrils daily as needed for allergies  Past Week at Unknown time Yes Beatriz Cifuentes APRN CNP   hydrOXYzine (ATARAX) 25 MG tablet TAKE 1 TO 2 TABLETS(25 TO 50 MG) BY MOUTH THREE TIMES DAILY AS NEEDED FOR ITCHING 3/29/2022 at AM Yes Beatriz Cifuentes APRN CNP   Melatonin ([employer] MELATONIN GUMMIES) 5 MG CHEW Take 5 mg by mouth At Bedtime 3/28/2022 at PM Yes Unknown, Entered By History

## 2022-03-29 NOTE — ED NOTES
Pt. Vomited up PO Potassium replacement, ED MD dominguez notified, droperidol ordered, family member requesting IV fluids and IV electrolytes.

## 2022-03-29 NOTE — ED TRIAGE NOTES
Vomiting since Friday. Seen Sunday and symtpoms are persistent. Unable to keep anything down. Denies diarrhea. Endorses rib pain from vomiting. Recent travel to [place].

## 2022-03-29 NOTE — LETTER
[place] [address] [facility]  [address]  [place] [facility] [address]  [phone] April 1, 2022    RE:  Humera Lovell                                                                                                                                                       [address]    [place] [address]            To whom it may concern:    Humera Lovell is under my professional care from 3/29/2022 to 04/01/2022. She had initially come to ER on 03/27 and was sent home.  She  may return to work on 04/04/2022    When the patient returns to work, she should avoid exertion.       Sincerely,         Michael King MD
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
## 2022-03-29 NOTE — Clinical Note
R-Band utilized for closure of site.  Manual pressure applied by scrub person; hemostasis achieved.

## 2022-03-29 NOTE — ED NOTES
[facility]  ED Nurse Handoff Report    Humera Lovell is a 41 year old female   ED Chief complaint: Vomiting  . ED Diagnosis:   Final diagnoses:   Chronic gastritis, presence of bleeding unspecified, unspecified gastritis type   Epigastric pain   Intractable vomiting with nausea, unspecified vomiting type   NSTEMI (non-ST elevated myocardial infarction) (H)     Allergies: No Known Allergies    Code Status: Full Code  Activity level - Baseline/Home:  Independent. Activity Level - Current:   Independent. Lift room needed: No. Bariatric: No   Needed: No   Isolation: No. Infection: Not Applicable.     Vital Signs:   Vitals:    03/29/22 1500 03/29/22 1530 03/29/22 1540 03/29/22 1628   BP: (!) 181/114 (!) 151/101     Pulse: 78 84 83    Resp: 13 18 15    Temp:       TempSrc:       SpO2:  100% 100%    Weight:    65.7 kg (144 lb 13.5 oz)       Cardiac Rhythm:  ,      Pain level:    Patient confused: No. Patient Falls Risk: Yes.   Elimination Status: Has voided   Patient Report - Initial Complaint: Vomiting. Focused Assessment: A&O. Presents to ED with continued nausea and vomiting. Seen two days ago in ED but unable to control symptoms at home. Denies abdominal pain, reports some epigastric discomfort. Trop elevated, cardiology consulted. Heparin drip running, aspirin given.  Tests Performed: labs. Abnormal Results:   Labs Ordered and Resulted from Time of ED Arrival to Time of ED Departure   COMPREHENSIVE METABOLIC PANEL - Abnormal       Result Value    Sodium 134      Potassium 3.1 (*)     Chloride 100      Carbon Dioxide (CO2) 28      Anion Gap 6      Urea Nitrogen 5 (*)     Creatinine 0.68      Calcium 8.1 (*)     Glucose 107 (*)     Alkaline Phosphatase 62      AST 29      ALT 40      Protein Total 7.0      Albumin 3.1 (*)     Bilirubin Total 0.6      GFR Estimate >90     ROUTINE UA WITH MICROSCOPIC REFLEX TO CULTURE - Abnormal    Color Urine Straw      Appearance Urine Clear      Glucose Urine  Negative      Bilirubin Urine Negative      Ketones Urine 10  (*)     Specific Gravity Urine 1.006      Blood Urine Large (*)     pH Urine 7.5 (*)     Protein Albumin Urine Negative      Urobilinogen Urine Normal      Nitrite Urine Negative      Leukocyte Esterase Urine Negative      Mucus Urine Present (*)     RBC Urine 5 (*)     WBC Urine 3      Squamous Epithelials Urine 6 (*)    TROPONIN I - Abnormal    Troponin I High Sensitivity 2,186 (*)    CBC WITH PLATELETS AND DIFFERENTIAL - Abnormal    WBC Count 5.9      RBC Count 4.31      Hemoglobin 15.0      Hematocrit 43.3       (*)     MCH 34.8 (*)     MCHC 34.6      RDW 11.7      Platelet Count 316     LIPASE - Normal    Lipase 80     HCG QUALITATIVE PREGNANCY - Normal    hCG Serum Qualitative Negative     LACTIC ACID WHOLE BLOOD - Normal    Lactic Acid 1.6     DIFFERENTIAL    % Neutrophils 69      % Lymphocytes 21      % Monocytes 8      % Eosinophils 2      % Basophils 0      Absolute Neutrophils 4.1      Absolute Lymphocytes 1.2      Absolute Monocytes 0.5      Absolute Eosinophils 0.1      Absolute Basophils 0.0      RBC Morphology Confirmed RBC Indices      Platelet Assessment        Value: Automated Count Confirmed. Platelet morphology is normal.   COVID-19 VIRUS (CORONAVIRUS) BY PCR   BLOOD CULTURE   BLOOD CULTURE     XR Chest 2 Views   Preliminary Result   IMPRESSION: No acute cardiopulmonary disease.        .   Treatments provided: See Mar  Family Comments: at bedside  OBS brochure/video discussed/provided to patient:  Yes  ED Medications:   Medications   potassium chloride (KLOR-CON) Packet 40 mEq (40 mEq Oral Not Given 3/29/22 1554)   heparin infusion 25,000 units in D5W 250 mL ANTICOAGULANT (780 Units/hr Intravenous New Bag 3/29/22 1653)   0.9% sodium chloride BOLUS (0 mLs Intravenous Stopped 3/29/22 1428)   droperidol (INAPSINE) injection 2.5 mg (2.5 mg Intravenous Given 3/29/22 1338)   dextrose 5% and 0.9% NaCl BOLUS (0 mLs Intravenous Stopped  3/29/22 1652)   ketorolac (TORADOL) injection 15 mg (15 mg Intravenous Given 3/29/22 1454)   lidocaine (viscous) (XYLOCAINE) 2 % 15 mL, alum & mag hydroxide-simethicone (MAALOX) 15 mL GI Cocktail (30 mLs Oral Given 3/29/22 1530)   famotidine (PEPCID) injection 20 mg (20 mg Intravenous Given 3/29/22 1529)   aspirin (ASA) tablet 325 mg (325 mg Oral Given 3/29/22 1655)   heparin loading dose for LOW INTENSITY TREATMENT * Give BEFORE starting heparin infusion (3,950 Units Intravenous Given 3/29/22 1653)   droperidol (INAPSINE) injection 2.5 mg (2.5 mg Intravenous Given 3/29/22 1725)     Drips infusing:  No  For the majority of the shift, the patient's behavior Green. Interventions performed were NA.    Sepsis treatment initiated: No     Patient tested for COVID 19 prior to admission: YES    ED Nurse Name/Phone Number: Dorothy Santiago RN,   6:07 PM  RECEIVING UNIT ED HANDOFF REVIEW    Above ED Nurse Handoff Report was reviewed: Yes  Reviewed by: Cathie Ludwig RN on March 29, 2022 at 6:17 PM

## 2022-03-30 ENCOUNTER — APPOINTMENT (OUTPATIENT)
Dept: CARDIOLOGY | Facility: CLINIC | Age: 42
End: 2022-03-30
Attending: PHYSICIAN ASSISTANT
Payer: COMMERCIAL

## 2022-03-30 LAB
ANION GAP SERPL CALCULATED.3IONS-SCNC: 5 MMOL/L (ref 3–14)
ATRIAL RATE - MUSE: 66 BPM
ATRIAL RATE - MUSE: 67 BPM
BUN SERPL-MCNC: 3 MG/DL (ref 7–30)
CALCIUM SERPL-MCNC: 8.7 MG/DL (ref 8.5–10.1)
CHLORIDE BLD-SCNC: 101 MMOL/L (ref 94–109)
CHOLEST SERPL-MCNC: 171 MG/DL
CO2 SERPL-SCNC: 28 MMOL/L (ref 20–32)
CREAT SERPL-MCNC: 0.61 MG/DL (ref 0.52–1.04)
DIASTOLIC BLOOD PRESSURE - MUSE: NORMAL MMHG
DIASTOLIC BLOOD PRESSURE - MUSE: NORMAL MMHG
ERYTHROCYTE [DISTWIDTH] IN BLOOD BY AUTOMATED COUNT: 12 % (ref 10–15)
GFR SERPL CREATININE-BSD FRML MDRD: >90 ML/MIN/1.73M2
GLUCOSE BLD-MCNC: 150 MG/DL (ref 70–99)
HCT VFR BLD AUTO: 38.9 % (ref 35–47)
HDLC SERPL-MCNC: 95 MG/DL
HGB BLD-MCNC: 13.6 G/DL (ref 11.7–15.7)
INTERPRETATION ECG - MUSE: NORMAL
INTERPRETATION ECG - MUSE: NORMAL
LDLC SERPL CALC-MCNC: 65 MG/DL
LVEF ECHO: NORMAL
MCH RBC QN AUTO: 35 PG (ref 26.5–33)
MCHC RBC AUTO-ENTMCNC: 35 G/DL (ref 31.5–36.5)
MCV RBC AUTO: 100 FL (ref 78–100)
NONHDLC SERPL-MCNC: 76 MG/DL
P AXIS - MUSE: 0 DEGREES
P AXIS - MUSE: 65 DEGREES
PLATELET # BLD AUTO: 278 10E3/UL (ref 150–450)
POTASSIUM BLD-SCNC: 3.8 MMOL/L (ref 3.4–5.3)
POTASSIUM BLD-SCNC: 3.8 MMOL/L (ref 3.4–5.3)
PR INTERVAL - MUSE: 114 MS
PR INTERVAL - MUSE: 122 MS
QRS DURATION - MUSE: 86 MS
QRS DURATION - MUSE: 88 MS
QT - MUSE: 454 MS
QT - MUSE: 458 MS
QTC - MUSE: 479 MS
QTC - MUSE: 480 MS
R AXIS - MUSE: 43 DEGREES
R AXIS - MUSE: 44 DEGREES
RBC # BLD AUTO: 3.89 10E6/UL (ref 3.8–5.2)
SODIUM SERPL-SCNC: 134 MMOL/L (ref 133–144)
SYSTOLIC BLOOD PRESSURE - MUSE: NORMAL MMHG
SYSTOLIC BLOOD PRESSURE - MUSE: NORMAL MMHG
T AXIS - MUSE: 29 DEGREES
T AXIS - MUSE: 35 DEGREES
TRIGL SERPL-MCNC: 57 MG/DL
UFH PPP CHRO-ACNC: 0.18 IU/ML
UFH PPP CHRO-ACNC: <0.1 IU/ML
VENTRICULAR RATE- MUSE: 66 BPM
VENTRICULAR RATE- MUSE: 67 BPM
WBC # BLD AUTO: 5.5 10E3/UL (ref 4–11)

## 2022-03-30 PROCEDURE — 85520 HEPARIN ASSAY: CPT | Performed by: PHYSICIAN ASSISTANT

## 2022-03-30 PROCEDURE — 250N000013 HC RX MED GY IP 250 OP 250 PS 637: Performed by: INTERNAL MEDICINE

## 2022-03-30 PROCEDURE — 120N000001 HC R&B MED SURG/OB

## 2022-03-30 PROCEDURE — 85027 COMPLETE CBC AUTOMATED: CPT | Performed by: PHYSICIAN ASSISTANT

## 2022-03-30 PROCEDURE — 250N000011 HC RX IP 250 OP 636: Performed by: PHYSICIAN ASSISTANT

## 2022-03-30 PROCEDURE — 250N000013 HC RX MED GY IP 250 OP 250 PS 637: Performed by: PHYSICIAN ASSISTANT

## 2022-03-30 PROCEDURE — 36415 COLL VENOUS BLD VENIPUNCTURE: CPT | Performed by: PHYSICIAN ASSISTANT

## 2022-03-30 PROCEDURE — 85520 HEPARIN ASSAY: CPT | Performed by: INTERNAL MEDICINE

## 2022-03-30 PROCEDURE — 80061 LIPID PANEL: CPT | Performed by: PHYSICIAN ASSISTANT

## 2022-03-30 PROCEDURE — 80048 BASIC METABOLIC PNL TOTAL CA: CPT | Performed by: PHYSICIAN ASSISTANT

## 2022-03-30 PROCEDURE — 99233 SBSQ HOSP IP/OBS HIGH 50: CPT | Performed by: STUDENT IN AN ORGANIZED HEALTH CARE EDUCATION/TRAINING PROGRAM

## 2022-03-30 PROCEDURE — C9113 INJ PANTOPRAZOLE SODIUM, VIA: HCPCS | Performed by: PHYSICIAN ASSISTANT

## 2022-03-30 PROCEDURE — 99222 1ST HOSP IP/OBS MODERATE 55: CPT | Mod: 25 | Performed by: INTERNAL MEDICINE

## 2022-03-30 PROCEDURE — 93306 TTE W/DOPPLER COMPLETE: CPT | Mod: 26 | Performed by: INTERNAL MEDICINE

## 2022-03-30 PROCEDURE — 84132 ASSAY OF SERUM POTASSIUM: CPT | Performed by: INTERNAL MEDICINE

## 2022-03-30 PROCEDURE — 36415 COLL VENOUS BLD VENIPUNCTURE: CPT | Performed by: INTERNAL MEDICINE

## 2022-03-30 PROCEDURE — 255N000002 HC RX 255 OP 636: Performed by: INTERNAL MEDICINE

## 2022-03-30 PROCEDURE — 999N000208 ECHOCARDIOGRAM COMPLETE

## 2022-03-30 PROCEDURE — 250N000013 HC RX MED GY IP 250 OP 250 PS 637

## 2022-03-30 PROCEDURE — 258N000003 HC RX IP 258 OP 636: Performed by: PHYSICIAN ASSISTANT

## 2022-03-30 RX ORDER — POTASSIUM CHLORIDE 1500 MG/1
20 TABLET, EXTENDED RELEASE ORAL ONCE
Status: COMPLETED | OUTPATIENT
Start: 2022-03-30 | End: 2022-03-30

## 2022-03-30 RX ADMIN — POTASSIUM CHLORIDE 20 MEQ: 1500 TABLET, EXTENDED RELEASE ORAL at 03:52

## 2022-03-30 RX ADMIN — PROCHLORPERAZINE EDISYLATE 10 MG: 5 INJECTION, SOLUTION INTRAMUSCULAR; INTRAVENOUS at 01:29

## 2022-03-30 RX ADMIN — PANTOPRAZOLE SODIUM 40 MG: 40 INJECTION, POWDER, FOR SOLUTION INTRAVENOUS at 08:58

## 2022-03-30 RX ADMIN — HEPARIN SODIUM AND DEXTROSE 930 UNITS/HR: 10000; 5 INJECTION INTRAVENOUS at 07:41

## 2022-03-30 RX ADMIN — HUMAN ALBUMIN MICROSPHERES AND PERFLUTREN 2 ML: 10; .22 INJECTION, SOLUTION INTRAVENOUS at 11:43

## 2022-03-30 RX ADMIN — PROCHLORPERAZINE MALEATE 10 MG: 10 TABLET ORAL at 16:16

## 2022-03-30 RX ADMIN — POTASSIUM CHLORIDE 20 MEQ: 1500 TABLET, EXTENDED RELEASE ORAL at 00:52

## 2022-03-30 RX ADMIN — Medication 1 MG: at 00:52

## 2022-03-30 RX ADMIN — CARVEDILOL 3.12 MG: 3.12 TABLET, FILM COATED ORAL at 08:58

## 2022-03-30 RX ADMIN — CARVEDILOL 3.12 MG: 3.12 TABLET, FILM COATED ORAL at 17:30

## 2022-03-30 RX ADMIN — LORAZEPAM 0.5 MG: 2 INJECTION INTRAMUSCULAR; INTRAVENOUS at 13:27

## 2022-03-30 RX ADMIN — POTASSIUM CHLORIDE, DEXTROSE MONOHYDRATE AND SODIUM CHLORIDE: 150; 5; 450 INJECTION, SOLUTION INTRAVENOUS at 08:58

## 2022-03-30 RX ADMIN — SIMVASTATIN 5 MG: 5 TABLET, FILM COATED ORAL at 19:39

## 2022-03-30 RX ADMIN — LORAZEPAM 0.5 MG: 2 INJECTION INTRAMUSCULAR; INTRAVENOUS at 19:42

## 2022-03-30 RX ADMIN — LORAZEPAM 0.5 MG: 2 INJECTION INTRAMUSCULAR; INTRAVENOUS at 07:47

## 2022-03-30 RX ADMIN — POTASSIUM CHLORIDE, DEXTROSE MONOHYDRATE AND SODIUM CHLORIDE: 150; 5; 450 INJECTION, SOLUTION INTRAVENOUS at 19:51

## 2022-03-30 RX ADMIN — ASPIRIN 81 MG CHEWABLE TABLET 81 MG: 81 TABLET CHEWABLE at 08:58

## 2022-03-30 RX ADMIN — ACETAMINOPHEN 650 MG: 325 TABLET, FILM COATED ORAL at 16:16

## 2022-03-30 ASSESSMENT — ACTIVITIES OF DAILY LIVING (ADL)
ADLS_ACUITY_SCORE: 12

## 2022-03-30 NOTE — PROGRESS NOTES
[facility]    Medicine Progress Note - Hospitalist Service    Date of Admission:  3/29/2022    Assessment & Plan        41-year-old female with no significant medical history who [place] and [place]27 where she developed nausea, vomiting and diarrhea and needed to diet hospitalization at the local hospital where she got only IV fluids.  She flew back to [place] on 3/27 and came to ER and had only mild hypokalemia with normal kidney function and was discharged home after IV fluids and Zofran.  24 hours after discharge, she had return of nausea and vomited and came back to ER on 3/29.    Systolic blood pressure was in 180s with mild hypokalemia but interestingly her troponin was elevated at 2186 with EKG showing no ischemic changes.  She was started on heparin drip.    Plan    1. Non-ST segment elevation MI.    Unclear etiology as she has no risk factors and exercises 3-4 times a week, and may be due to demand ischemia some cardiac necrosis due to metabolic abnormalities.    Continue on heparin drip.  Patient seen by cardiology and plan to get an echocardiogram and if it shows a wall motion abnormality, will go ahead and do an coronary angiogram as advised patient will have an outpatient stress echocardiogram.      Continue aspirin, low-dose carvedilol and statins.  2. Intractable nausea and vomiting with few episodes of diarrhea with recent travel.    Likely had viral gastroenteritis.  Check stool cultures and ova and parasites if diarrhea reoccurs.    May also be related to her recent marijuana use.  3. Hypokalemia.  Secondary to nausea, vomiting and diarrhea.  Replaced.           Diet: NPO per Anesthesia Guidelines for Procedure/Surgery Except for: Meds, Ice Chips    DVT Prophylaxis: On heparin drip  Ayers Catheter: Not present  Central Lines: None  Cardiac Monitoring: ACTIVE order. Indication: AMI (NSTEMI/ STEMI) (48 hours)  Code Status: Full Code      Disposition Plan    Expected Discharge: 03/31/2022     Anticipated discharge location:  Awaiting care coordination huddle  Delays:            The patient's care was discussed with the Patient, RN and cardiology..    Michael King MD  Hospitalist Service  [facility]  Securely message with the Vocera Web Console (learn more here)  Text page via [employer] Paging/Directory         Clinically Significant Risk Factors Present on Admission             # Hypoalbuminemia: Albumin = 3.1 g/dL (Ref range: 3.4 - 5.0 g/dL) on admission, will monitor as appropriate          ______________________________________________________________________    Interval History   Continues to have nausea but no abdominal pain or diarrhea.  Has some mild chest discomfort.    Data reviewed today: I reviewed all medications, new labs and imaging results over the last 24 hours.    Physical Exam   Vital Signs: Temp: 98  F (36.7  C) Temp src: Oral BP: 132/85 Pulse: 86   Resp: 16 SpO2: 100 % O2 Device: None (Room air)    Weight: 144 lbs 13.48 oz  General Appearance: Nauseated but otherwise in no acute distress  Respiratory: Clear to auscultation  Cardiovascular: S1-S2 normal  GI: Abdomen is soft and nontender  Skin: No rash  Other: No edema    Data   Recent Labs   Lab 03/30/22  0554 03/29/22  1959 03/29/22  1459 03/29/22  1318 03/27/22  2200   WBC 5.5  --   --  5.9 9.3   HGB 13.6  --   --  15.0 11.9     --   --  101* 101*     --   --  316 297     --  134  --  133   POTASSIUM 3.8  3.8 2.9* 3.1*  --  3.0*   CHLORIDE 101  --  100  --  99   CO2 28  --  28  --  29   BUN 3*  --  5*  --  11   CR 0.61  --  0.68  --  0.61   ANIONGAP 5  --  6  --  5   ANAHY 8.7  --  8.1*  --  9.1   *  --  107*  --  124*   ALBUMIN  --   --  3.1*  --  3.8   PROTTOTAL  --   --  7.0  --  7.7   BILITOTAL  --   --  0.6  --  0.6   ALKPHOS  --   --  62  --  57   ALT  --   --  40  --  37   AST  --   --  29  --  37   LIPASE  --   --  80  --  70*     Recent  Results (from the past 24 hour(s))   XR Chest 2 Views    Narrative    CHEST TWO VIEW   3/29/2022 4:44 PM     HISTORY: Chest pain, recurrent vomiting, NSTEMI.    COMPARISON: None.      Impression    IMPRESSION: No acute cardiopulmonary disease.
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
## 2022-03-30 NOTE — PROGRESS NOTES
Cross cover note    I was notified regarding hypokalemia at 2.9  Nursing service requested to reorder potassium protocol replacement  I attempted on numerous occasions but epic is not allowing to place further orders for potassium supplementation protocol  I am not quite certain why epic is not allowing to put any new orders for potassium protocol  I informed nursing service and they are aware at there and and will be informing IT service.   Charge nurse was made aware by floor nurse

## 2022-03-30 NOTE — PLAN OF CARE
"Goal Outcome Evaluation:      /85 (BP Location: Right arm, Patient Position: Supine)   Pulse 86   Temp 98  F (36.7  C) (Oral)   Resp 16   Ht 1.702 m (5' 7\")   Wt 65.7 kg (144 lb 13.5 oz)   SpO2 100%   BMI 22.69 kg/m        VSS. AO x4. Up ind in room. Tele: SR. Hep dc'd today. Very nauseated today- IV ativan given with relief. One episode of emesis this afternoon. Clear liq diet. Will continue POC.                 "

## 2022-03-30 NOTE — PROGRESS NOTES
Care Management Discharge Note    Discharge Date: 03/31/2022     Discharge Disposition:  Home    Additional Information:  Pt has a [facility] listed and is identified as a Service Bundle #2. Per chart review, no needs or assessment needed at this time. Please consult CM/SW if discharge needs should arise.    Neyda Noble RN BSN   Inpatient Care Coordination  [facility]   Phone [phone]

## 2022-03-30 NOTE — PROVIDER NOTIFICATION
MD paged. Pt's  K+ 2.9.  To replace the system didn't work.  Could you please reorder K+ replacement?  Thank you.

## 2022-03-30 NOTE — PLAN OF CARE
"A/O, up with SBA. Continuous IVF + Heparin 780u/hr infusing. Denies pain/SOB. Pt had an episode of nausea and vomiting on nights, antiemetics given. Low K+ replaced, lab order scheduled for recheck. Tele SR w/peak T waves. RA, JOJO clear. NPO. Cardiology consult today. Plan for ECHO and possible angio. Continue to monitor. Safety bed alarm on and call light within reach.    /73 (BP Location: Right arm, Patient Position: Supine, Cuff Size: Adult Regular)   Pulse 95   Temp 98.5  F (36.9  C) (Oral)   Resp 18   Ht 1.702 m (5' 7\")   Wt 65.7 kg (144 lb 13.5 oz)   SpO2 99%   BMI 22.69 kg/m       "

## 2022-03-30 NOTE — PLAN OF CARE
Goal Outcome Evaluation:        Admitting Diagnosis:NSTEMI  Pertinent History: anxiety, major depression disorder.  For vitals and assessment please see flow sheet.   Living Situation:Home   Pain plan: c/o Nausea IV Ativan given. Per pt request.   Mobility: assistance, stand-by  Baseline activity: Independent.  Alarms/Safety: BA.  LDA's: PIV.   Pertinent test results: K+ 2.9,Mag+ 2.1  Consults Cardiology following.   Abnormals/Pending: K+ 2.9  Other Cares/Comments: A & O x4, VSS, SBP elevated please see MAR. LS  clear, RA. IVF & D.5% infusing.   Discharge Disposition: TBD  Discharge Time:TBD.

## 2022-03-31 LAB
BACTERIA UR CULT: NORMAL
ERYTHROCYTE [SEDIMENTATION RATE] IN BLOOD BY WESTERGREN METHOD: 10 MM/HR (ref 0–20)
POTASSIUM BLD-SCNC: 4.1 MMOL/L (ref 3.4–5.3)

## 2022-03-31 PROCEDURE — 99152 MOD SED SAME PHYS/QHP 5/>YRS: CPT | Performed by: INTERNAL MEDICINE

## 2022-03-31 PROCEDURE — 120N000001 HC R&B MED SURG/OB

## 2022-03-31 PROCEDURE — 36415 COLL VENOUS BLD VENIPUNCTURE: CPT | Performed by: NURSE PRACTITIONER

## 2022-03-31 PROCEDURE — 258N000003 HC RX IP 258 OP 636: Performed by: PHYSICIAN ASSISTANT

## 2022-03-31 PROCEDURE — 250N000013 HC RX MED GY IP 250 OP 250 PS 637: Performed by: PHYSICIAN ASSISTANT

## 2022-03-31 PROCEDURE — C1887 CATHETER, GUIDING: HCPCS | Performed by: INTERNAL MEDICINE

## 2022-03-31 PROCEDURE — 99207 PR SC NO CHARGE VISIT: CPT | Performed by: INTERNAL MEDICINE

## 2022-03-31 PROCEDURE — 250N000011 HC RX IP 250 OP 636: Performed by: INTERNAL MEDICINE

## 2022-03-31 PROCEDURE — 93454 CORONARY ARTERY ANGIO S&I: CPT | Performed by: INTERNAL MEDICINE

## 2022-03-31 PROCEDURE — 84132 ASSAY OF SERUM POTASSIUM: CPT | Performed by: STUDENT IN AN ORGANIZED HEALTH CARE EDUCATION/TRAINING PROGRAM

## 2022-03-31 PROCEDURE — 250N000011 HC RX IP 250 OP 636: Performed by: PHYSICIAN ASSISTANT

## 2022-03-31 PROCEDURE — 258N000003 HC RX IP 258 OP 636: Performed by: NURSE PRACTITIONER

## 2022-03-31 PROCEDURE — 272N000001 HC OR GENERAL SUPPLY STERILE: Performed by: INTERNAL MEDICINE

## 2022-03-31 PROCEDURE — 250N000013 HC RX MED GY IP 250 OP 250 PS 637: Performed by: NURSE PRACTITIONER

## 2022-03-31 PROCEDURE — 258N000003 HC RX IP 258 OP 636: Performed by: INTERNAL MEDICINE

## 2022-03-31 PROCEDURE — 36415 COLL VENOUS BLD VENIPUNCTURE: CPT | Performed by: STUDENT IN AN ORGANIZED HEALTH CARE EDUCATION/TRAINING PROGRAM

## 2022-03-31 PROCEDURE — C1894 INTRO/SHEATH, NON-LASER: HCPCS | Performed by: INTERNAL MEDICINE

## 2022-03-31 PROCEDURE — C9113 INJ PANTOPRAZOLE SODIUM, VIA: HCPCS | Performed by: PHYSICIAN ASSISTANT

## 2022-03-31 PROCEDURE — 250N000013 HC RX MED GY IP 250 OP 250 PS 637

## 2022-03-31 PROCEDURE — 85652 RBC SED RATE AUTOMATED: CPT | Performed by: NURSE PRACTITIONER

## 2022-03-31 PROCEDURE — 99233 SBSQ HOSP IP/OBS HIGH 50: CPT | Performed by: STUDENT IN AN ORGANIZED HEALTH CARE EDUCATION/TRAINING PROGRAM

## 2022-03-31 PROCEDURE — 250N000009 HC RX 250: Performed by: INTERNAL MEDICINE

## 2022-03-31 PROCEDURE — B2111ZZ FLUOROSCOPY OF MULTIPLE CORONARY ARTERIES USING LOW OSMOLAR CONTRAST: ICD-10-PCS | Performed by: INTERNAL MEDICINE

## 2022-03-31 PROCEDURE — 93454 CORONARY ARTERY ANGIO S&I: CPT | Mod: 26 | Performed by: INTERNAL MEDICINE

## 2022-03-31 PROCEDURE — 99233 SBSQ HOSP IP/OBS HIGH 50: CPT | Performed by: INTERNAL MEDICINE

## 2022-03-31 RX ORDER — OXYCODONE HYDROCHLORIDE 5 MG/1
10 TABLET ORAL EVERY 4 HOURS PRN
Status: DISCONTINUED | OUTPATIENT
Start: 2022-03-31 | End: 2022-04-01 | Stop reason: HOSPADM

## 2022-03-31 RX ORDER — LIDOCAINE 40 MG/G
CREAM TOPICAL
Status: DISCONTINUED | OUTPATIENT
Start: 2022-03-31 | End: 2022-03-31 | Stop reason: HOSPADM

## 2022-03-31 RX ORDER — FLUMAZENIL 0.1 MG/ML
0.2 INJECTION, SOLUTION INTRAVENOUS
Status: ACTIVE | OUTPATIENT
Start: 2022-03-31 | End: 2022-03-31

## 2022-03-31 RX ORDER — NALOXONE HYDROCHLORIDE 0.4 MG/ML
0.2 INJECTION, SOLUTION INTRAMUSCULAR; INTRAVENOUS; SUBCUTANEOUS
Status: DISCONTINUED | OUTPATIENT
Start: 2022-03-31 | End: 2022-03-31

## 2022-03-31 RX ORDER — HEPARIN SODIUM 1000 [USP'U]/ML
INJECTION, SOLUTION INTRAVENOUS; SUBCUTANEOUS
Status: DISCONTINUED
Start: 2022-03-31 | End: 2022-03-31 | Stop reason: HOSPADM

## 2022-03-31 RX ORDER — VERAPAMIL HYDROCHLORIDE 2.5 MG/ML
INJECTION, SOLUTION INTRAVENOUS
Status: DISCONTINUED
Start: 2022-03-31 | End: 2022-03-31 | Stop reason: HOSPADM

## 2022-03-31 RX ORDER — NALOXONE HYDROCHLORIDE 0.4 MG/ML
0.4 INJECTION, SOLUTION INTRAMUSCULAR; INTRAVENOUS; SUBCUTANEOUS
Status: DISCONTINUED | OUTPATIENT
Start: 2022-03-31 | End: 2022-03-31

## 2022-03-31 RX ORDER — LIDOCAINE HYDROCHLORIDE 10 MG/ML
INJECTION, SOLUTION EPIDURAL; INFILTRATION; INTRACAUDAL; PERINEURAL
Status: DISCONTINUED
Start: 2022-03-31 | End: 2022-03-31 | Stop reason: HOSPADM

## 2022-03-31 RX ORDER — ARGATROBAN 1 MG/ML
150 INJECTION, SOLUTION INTRAVENOUS
Status: DISCONTINUED | OUTPATIENT
Start: 2022-03-31 | End: 2022-03-31 | Stop reason: HOSPADM

## 2022-03-31 RX ORDER — FENTANYL CITRATE 50 UG/ML
INJECTION, SOLUTION INTRAMUSCULAR; INTRAVENOUS
Status: DISCONTINUED | OUTPATIENT
Start: 2022-03-31 | End: 2022-03-31 | Stop reason: HOSPADM

## 2022-03-31 RX ORDER — HEPARIN SODIUM 10000 [USP'U]/100ML
100-1000 INJECTION, SOLUTION INTRAVENOUS CONTINUOUS PRN
Status: DISCONTINUED | OUTPATIENT
Start: 2022-03-31 | End: 2022-03-31 | Stop reason: HOSPADM

## 2022-03-31 RX ORDER — ASPIRIN 325 MG
325 TABLET ORAL ONCE
Status: COMPLETED | OUTPATIENT
Start: 2022-03-31 | End: 2022-03-31

## 2022-03-31 RX ORDER — EPTIFIBATIDE 2 MG/ML
2 INJECTION, SOLUTION INTRAVENOUS CONTINUOUS PRN
Status: DISCONTINUED | OUTPATIENT
Start: 2022-03-31 | End: 2022-03-31 | Stop reason: HOSPADM

## 2022-03-31 RX ORDER — IOPAMIDOL 755 MG/ML
INJECTION, SOLUTION INTRAVASCULAR
Status: DISCONTINUED | OUTPATIENT
Start: 2022-03-31 | End: 2022-03-31 | Stop reason: HOSPADM

## 2022-03-31 RX ORDER — VERAPAMIL HYDROCHLORIDE 2.5 MG/ML
INJECTION, SOLUTION INTRAVENOUS
Status: DISCONTINUED | OUTPATIENT
Start: 2022-03-31 | End: 2022-03-31 | Stop reason: HOSPADM

## 2022-03-31 RX ORDER — FENTANYL CITRATE-0.9 % NACL/PF 10 MCG/ML
PLASTIC BAG, INJECTION (ML) INTRAVENOUS
Status: DISCONTINUED
Start: 2022-03-31 | End: 2022-03-31 | Stop reason: HOSPADM

## 2022-03-31 RX ORDER — EPTIFIBATIDE 2 MG/ML
180 INJECTION, SOLUTION INTRAVENOUS EVERY 10 MIN PRN
Status: DISCONTINUED | OUTPATIENT
Start: 2022-03-31 | End: 2022-03-31 | Stop reason: HOSPADM

## 2022-03-31 RX ORDER — FENTANYL CITRATE-0.9 % NACL/PF 10 MCG/ML
PLASTIC BAG, INJECTION (ML) INTRAVENOUS
Status: DISCONTINUED | OUTPATIENT
Start: 2022-03-31 | End: 2022-03-31 | Stop reason: HOSPADM

## 2022-03-31 RX ORDER — FENTANYL CITRATE 50 UG/ML
25 INJECTION, SOLUTION INTRAMUSCULAR; INTRAVENOUS
Status: DISCONTINUED | OUTPATIENT
Start: 2022-03-31 | End: 2022-04-01 | Stop reason: HOSPADM

## 2022-03-31 RX ORDER — SODIUM CHLORIDE 9 MG/ML
75 INJECTION, SOLUTION INTRAVENOUS CONTINUOUS
Status: ACTIVE | OUTPATIENT
Start: 2022-03-31 | End: 2022-03-31

## 2022-03-31 RX ORDER — ATROPINE SULFATE 0.1 MG/ML
0.5 INJECTION INTRAVENOUS
Status: ACTIVE | OUTPATIENT
Start: 2022-03-31 | End: 2022-03-31

## 2022-03-31 RX ORDER — ACETAMINOPHEN 325 MG/1
650 TABLET ORAL EVERY 4 HOURS PRN
Status: DISCONTINUED | OUTPATIENT
Start: 2022-03-31 | End: 2022-03-31

## 2022-03-31 RX ORDER — ZOLPIDEM TARTRATE 5 MG/1
5 TABLET ORAL
Status: DISCONTINUED | OUTPATIENT
Start: 2022-03-31 | End: 2022-04-01 | Stop reason: HOSPADM

## 2022-03-31 RX ORDER — ASPIRIN 81 MG/1
243 TABLET, CHEWABLE ORAL ONCE
Status: COMPLETED | OUTPATIENT
Start: 2022-03-31 | End: 2022-03-31

## 2022-03-31 RX ORDER — NITROGLYCERIN 5 MG/ML
VIAL (ML) INTRAVENOUS
Status: DISCONTINUED
Start: 2022-03-31 | End: 2022-03-31 | Stop reason: HOSPADM

## 2022-03-31 RX ORDER — FENTANYL CITRATE 50 UG/ML
INJECTION, SOLUTION INTRAMUSCULAR; INTRAVENOUS
Status: DISCONTINUED
Start: 2022-03-31 | End: 2022-03-31 | Stop reason: HOSPADM

## 2022-03-31 RX ORDER — DOPAMINE HYDROCHLORIDE 160 MG/100ML
2-20 INJECTION, SOLUTION INTRAVENOUS CONTINUOUS PRN
Status: DISCONTINUED | OUTPATIENT
Start: 2022-03-31 | End: 2022-03-31 | Stop reason: HOSPADM

## 2022-03-31 RX ORDER — POTASSIUM CHLORIDE 1500 MG/1
20 TABLET, EXTENDED RELEASE ORAL
Status: CANCELLED | OUTPATIENT
Start: 2022-03-31

## 2022-03-31 RX ORDER — DOBUTAMINE HYDROCHLORIDE 200 MG/100ML
2-20 INJECTION INTRAVENOUS CONTINUOUS PRN
Status: DISCONTINUED | OUTPATIENT
Start: 2022-03-31 | End: 2022-03-31 | Stop reason: HOSPADM

## 2022-03-31 RX ORDER — SODIUM CHLORIDE 9 MG/ML
INJECTION, SOLUTION INTRAVENOUS CONTINUOUS
Status: DISCONTINUED | OUTPATIENT
Start: 2022-03-31 | End: 2022-03-31 | Stop reason: HOSPADM

## 2022-03-31 RX ORDER — ARGATROBAN 1 MG/ML
350 INJECTION, SOLUTION INTRAVENOUS
Status: DISCONTINUED | OUTPATIENT
Start: 2022-03-31 | End: 2022-03-31 | Stop reason: HOSPADM

## 2022-03-31 RX ORDER — OXYCODONE HYDROCHLORIDE 5 MG/1
5 TABLET ORAL EVERY 4 HOURS PRN
Status: DISCONTINUED | OUTPATIENT
Start: 2022-03-31 | End: 2022-04-01 | Stop reason: HOSPADM

## 2022-03-31 RX ORDER — HEPARIN SODIUM 1000 [USP'U]/ML
INJECTION, SOLUTION INTRAVENOUS; SUBCUTANEOUS
Status: DISCONTINUED | OUTPATIENT
Start: 2022-03-31 | End: 2022-03-31 | Stop reason: HOSPADM

## 2022-03-31 RX ORDER — NITROGLYCERIN 5 MG/ML
VIAL (ML) INTRAVENOUS
Status: DISCONTINUED | OUTPATIENT
Start: 2022-03-31 | End: 2022-03-31 | Stop reason: HOSPADM

## 2022-03-31 RX ADMIN — SODIUM CHLORIDE 75 ML/HR: 9 INJECTION, SOLUTION INTRAVENOUS at 16:28

## 2022-03-31 RX ADMIN — ASPIRIN 81 MG CHEWABLE TABLET 81 MG: 81 TABLET CHEWABLE at 10:50

## 2022-03-31 RX ADMIN — POTASSIUM CHLORIDE, DEXTROSE MONOHYDRATE AND SODIUM CHLORIDE: 150; 5; 450 INJECTION, SOLUTION INTRAVENOUS at 06:02

## 2022-03-31 RX ADMIN — Medication 1 MG: at 20:50

## 2022-03-31 RX ADMIN — SIMVASTATIN 5 MG: 5 TABLET, FILM COATED ORAL at 20:50

## 2022-03-31 RX ADMIN — ASPIRIN 81 MG CHEWABLE TABLET 243 MG: 81 TABLET CHEWABLE at 11:09

## 2022-03-31 RX ADMIN — SODIUM CHLORIDE: 9 INJECTION, SOLUTION INTRAVENOUS at 12:31

## 2022-03-31 RX ADMIN — PANTOPRAZOLE SODIUM 40 MG: 40 INJECTION, POWDER, FOR SOLUTION INTRAVENOUS at 10:51

## 2022-03-31 RX ADMIN — Medication 1 MG: at 01:51

## 2022-03-31 ASSESSMENT — ACTIVITIES OF DAILY LIVING (ADL)
ADLS_ACUITY_SCORE: 12

## 2022-03-31 NOTE — PROGRESS NOTES
[facility]    Medicine Progress Note - Hospitalist Service    Date of Admission:  3/29/2022    Assessment & Plan        41-year-old female with no significant medical history who [place] resort and [place]27 where she developed nausea, vomiting and diarrhea and needed to diet hospitalization at the local hospital where she got only IV fluids.  She flew back to [place] on 3/27 and came to ER and had only mild hypokalemia with normal kidney function and was discharged home after IV fluids and Zofran.  24 hours after discharge, she had return of nausea and vomited and came back to ER on 3/29.    Systolic blood pressure was in 180s with mild hypokalemia but interestingly her troponin was elevated at 2186 with EKG showing no ischemic changes.  She was started on heparin drip.    Plan    1. Non-ST segment elevation MI.    Unclear etiology as she has no risk factors and exercises 3-4 times a week, and was thought to be due to demand ischemia some cardiac necrosis due to metabolic abnormalities.  But echocardiogram showed localized inferior lateral wall motion abnormalities and are planning to do a angiogram today.  She also had elevated CRP with a concern for myopericarditis but there was no pericardial effusion.    Continue aspirin, low-dose carvedilol and statins.  2. Intractable nausea and vomiting with few episodes of diarrhea with recent travel.    Likely had viral gastroenteritis.  Diarrhea and nausea/vomiting have now resolved.    May also be related to her recent marijuana use.  3. Hypokalemia.  Secondary to nausea, vomiting and diarrhea.  Replaced.       Diet: NPO for Medical/Clinical Reasons Except for: Meds, Ice Chips  NPO for Medical/Clinical Reasons Except for: Meds    DVT Prophylaxis: On heparin drip  Ayers Catheter: Not present  Central Lines: None  Cardiac Monitoring: ACTIVE order. Indication: AMI (NSTEMI/ STEMI) (48 hours)  Code Status: Full Code      Disposition Plan    Expected Discharge: 04/01/2022     Anticipated discharge location:  Awaiting care coordination huddle  Delays:            The patient's care was discussed with the Patient, RN and cardiology..    Michael King MD  Hospitalist Service  [facility]  Securely message with the Vocera Web Console (learn more here)  Text page via [employer] Paging/Directory         Clinically Significant Risk Factors Present on Admission                  ______________________________________________________________________    Interval History   Continues to have nausea but no abdominal pain or diarrhea.  Has some mild chest discomfort.    Data reviewed today: I reviewed all medications, new labs and imaging results over the last 24 hours.    Physical Exam   Vital Signs: Temp: 98.6  F (37  C) Temp src: Oral BP: 90/55 Pulse: 70   Resp: 18 SpO2: 100 % O2 Device: None (Room air)    Weight: 144 lbs 13.48 oz  General Appearance: Nauseated but otherwise in no acute distress  Respiratory: Clear to auscultation  Cardiovascular: S1-S2 normal  GI: Abdomen is soft and nontender  Skin: No rash  Other: No edema    Data   Recent Labs   Lab 03/31/22  0651 03/30/22  0554 03/29/22  1959 03/29/22  1459 03/29/22  1318 03/27/22  2200   WBC  --  5.5  --   --  5.9 9.3   HGB  --  13.6  --   --  15.0 11.9   MCV  --  100  --   --  101* 101*   PLT  --  278  --   --  316 297   NA  --  134  --  134  --  133   POTASSIUM 4.1 3.8  3.8 2.9* 3.1*  --  3.0*   CHLORIDE  --  101  --  100  --  99   CO2  --  28  --  28  --  29   BUN  --  3*  --  5*  --  11   CR  --  0.61  --  0.68  --  0.61   ANIONGAP  --  5  --  6  --  5   ANAHY  --  8.7  --  8.1*  --  9.1   GLC  --  150*  --  107*  --  124*   ALBUMIN  --   --   --  3.1*  --  3.8   PROTTOTAL  --   --   --  7.0  --  7.7   BILITOTAL  --   --   --  0.6  --  0.6   ALKPHOS  --   --   --  62  --  57   ALT  --   --   --  40  --  37   AST  --   --   --  29  --  37   LIPASE  --   --   --  80  --  70*     No results  found for this or any previous visit (from the past 24 hour(s)).

## 2022-03-31 NOTE — PROGRESS NOTES
[facility]    Cardiology Progress Note    Date of Admission: 03/29/2022  Service Date: 03/31/2022    Summary:  Ms. Humera Lovell is a very pleasant 41 year old female with no significant past medical history who was admitted on 3/29/2022 after presenting with epigastric pain and recurrent intractable nausea and vomiting in the setting of recent travel to [place]. Cardiology was consulted for troponin elevation.    Interval History   No acute events overnight. VSS. She is feeling a bit better this morning with improvement in her nausea and epigastric pain. She otherwise denies chest pain, palpitations, or shortness of breath. We reviewed her echocardiogram findings as outlined below showing wall motion abnormality. Discussed case with Dr. Clarke and given the abnormal echo findings with her troponin elevation, recommend proceeding with diagnostic  coronary angiogram and possible PCI to rule out CAD. Risks and benefits of coronary angiogram discussed today including, bleeding, bruising, infection, allergic reaction, kidney damage (including need for dialysis), stroke, heart attack, vascular damage, emergency open heart surgery, up to and including death. Patient indicates understanding and is agreeable to proceed. She has no known history of an allergy to contrast dye.    Telemetry: NSR    Assessment & Plan   1. Troponin elevation; NSTEMI  - HS Troponin elevated to 2,345.   - TTE 3/29/22 showing LVEF 55-60% with moderate to severe inferolateral wall hypokinesis. Grade I or early diastolic dysfunction noted. No pericardial effusion.  - Suspect type 2 NSTEMI in the setting of intractable nausea and vomiting, however, given the WMA on TTE cannot entirely rule out type 1 and underlying CAD. Myopericarditis is another possibility associated with suspected viral gastritis as CRP inflammation is elevated at 21.1.    2. Intractable nausea and vomiting, suspected viral gastritis    3. Hypokalemia, secondary  to nausea and vomiting     4. Epigastric pain    Plan:   1. Coronary angiogram and possible PCI today. Will need to be this afternoon as she did have breakfast at 0730 this morning.   2. Will check ESR to further evaluate for possible myopericarditis.   3. Continue aspirin, beta blocker, and statin for now.  4. Cardiology will continue to follow along.     I spent 40 minutes face-to-face or coordinating care of Humera Lovell. Over 50% of our time on the unit was spent counseling the patient and/or coordinating care.    Thank you for the opportunity to participate in this pleasant patient's care.     CHONG Lewis, CNP   Nurse Practitioner  [facility]  Pager: [phone]  Text Page  (8am - 5pm, M-F)    Patient Active Problem List   Diagnosis     Epigastric pain     NSTEMI (non-ST elevated myocardial infarction) (H)     Chronic gastritis, presence of bleeding unspecified, unspecified gastritis type     Intractable vomiting with nausea, unspecified vomiting type     Physical Exam   Temp: 98.6  F (37  C) Temp src: Oral BP: 93/48 Pulse: 83   Resp: 18 SpO2: 100 % O2 Device: None (Room air)    Vitals:    03/29/22 1628   Weight: 65.7 kg (144 lb 13.5 oz)     Vital Signs with Ranges  Temp:  [98.3  F (36.8  C)-98.7  F (37.1  C)] 98.6  F (37  C)  Pulse:  [78-83] 83  Resp:  [18] 18  BP: ()/(48-97) 93/48  SpO2:  [100 %] 100 %  I/O last 3 completed shifts:  In: 2598 [P.O.:320; I.V.:2278]  Out: 1200 [Urine:1200]    Constitutional: Appears her stated age, well nourished, and in no acute distress.  Eyes: Pupils equal, round. Sclerae anicteric.   HEENT: Normocephalic, atraumatic.   Respiratory: Breathing non-labored. Lungs clear to auscultation bilaterally. No crackles, wheezes, rhonchi, or rales.  Cardiovascular: Regular rate and rhythm, normal S1 and S2. No murmur, rub, or gallop.  Skin: Warm, dry. No apparent rashes.  Musculoskeletal/Extremities: Moves all extremities well and symmetrically. No  edema.  Neurologic: No gross focal deficits. Alert, awake, and oriented to person, place and time.  Psychiatric: Affect appropriate. Mentation normal.    Medications     Continuing ACE inhibitor/ARB/ARNI from home medication list OR ACE inhibitor/ARB order already placed during this visit       - MEDICATION INSTRUCTIONS -       dextrose 5% and 0.45% NaCl + KCl 20 mEq/L 100 mL/hr at 22 0602     - MEDICATION INSTRUCTIONS -       - MEDICATION INSTRUCTIONS -       BETA BLOCKER NOT PRESCRIBED         aspirin  81 mg Oral Daily     carvedilol  3.125 mg Oral BID w/meals     pantoprazole (PROTONIX) IV  40 mg Intravenous Daily with breakfast     scopolamine  1 patch Transdermal Q72H    And     scopolamine   Transdermal Q8H     simvastatin  5 mg Oral QPM     Data   Results for orders placed or performed during the hospital encounter of 22 (from the past 24 hour(s))   Echocardiogram Complete   Result Value Ref Range    LVEF  55-60%     Narrative    [id number]  [id number]  [id number]  [id number]^JAYESH^JEANNA^LUPILLO     [facility]  Echocardiography Laboratory  [address] [place] [address]     Name: NINOSKA MAHAN  MRN: [id number]  : 1980  Study Date: 2022 10:55 AM  Age: 41 yrs  Gender: Female  Patient Location: [facility]  Reason For Study: Chest Pain, Chest Tightness, Chest Pressure  Ordering Physician: JEANNA MCCONNELL  Referring Physician: Beatriz Cifuentes  Performed By: Minoo Bermeo RDCS     BSA: 1.8 m2  Height: 67 in  Weight: 144 lb  HR: 64  BP: 174/103 mmHg  ______________________________________________________________________________  Procedure  Complete Portable Echo Adult. Optison (NDC #[id number]) given intravenously.  ______________________________________________________________________________  Interpretation Summary     There is moderate to severe inferolateral wall hypokinesis.  Left ventricular systolic function is normal.  The visual ejection fraction is 55-60%.  The left  ventricle is normal in size.  The right ventricle is normal in structure, function and size.  There is mild (1+) mitral regurgitation.     No old studies avaiable for comparion.  ______________________________________________________________________________  Left Ventricle  The left ventricle is normal in size. There is normal left ventricular wall  thickness. Left ventricular systolic function is normal. The visual ejection  fraction is 55-60%. Grade I or early diastolic dysfunction. There is moderate  to severe inferolateral wall hypokinesis. There is no thrombus seen in the  left ventricle.     Right Ventricle  The right ventricle is normal in structure, function and size. There is no  mass or thrombus in the right ventricle.     Atria  Normal left atrial size. Right atrial size is normal. There is no atrial shunt  seen. The left atrial appendage is not well visualized.     Mitral Valve  The mitral valve leaflets appear normal. There is no evidence of stenosis,  fluttering, or prolapse. There is mild (1+) mitral regurgitation. There is no  mitral valve stenosis.     Tricuspid Valve  Normal tricuspid valve. The right ventricular systolic pressure is  approximated at 11.6 mmHg plus the right atrial pressure. Right ventricle  systolic pressure estimate normal. There is mild (1+) tricuspid regurgitation.  There is no tricuspid stenosis.     Aortic Valve  The aortic valve is trileaflet. No aortic regurgitation is present. No aortic  stenosis is present.     Pulmonic Valve  Normal pulmonic valve. There is no pulmonic valvular regurgitation. There is  no pulmonic valvular stenosis.     Vessels  The aortic root is normal size. Normal size ascending aorta. The inferior vena  cava is normal. The pulmonary artery is normal size.     Pericardium  The pericardium appears normal. There is no pleural effusion.     Rhythm  Sinus rhythm was  noted.  ______________________________________________________________________________  MMode/2D Measurements & Calculations  IVSd: 0.65 cm     LVIDd: 4.7 cm  LVIDs: 3.8 cm  LVPWd: 0.76 cm  FS: 17.8 %  LV mass(C)d: 102.1 grams  LV mass(C)dI: 58.1 grams/m2  Ao root diam: 2.5 cm  LA dimension: 2.7 cm  asc Aorta Diam: 2.6 cm  LA/Ao: 1.1  LVOT diam: 2.1 cm  LVOT area: 3.5 cm2  LA Volume (BP): 32.5 ml  LA Volume Index (BP): 18.5 ml/m2  RWT: 0.32     Doppler Measurements & Calculations  MV E max werner: 41.9 cm/sec  MV A max werner: 52.9 cm/sec  MV E/A: 0.79  MV max P.7 mmHg  MV mean P.0 mmHg  MV V2 VTI: 13.8 cm  MVA(VTI): 4.0 cm2  MV dec time: 0.19 sec  LV V1 max P.6 mmHg  LV V1 max: 80.7 cm/sec  LV V1 VTI: 16.0 cm  SV(LVOT): 55.4 ml  SI(LVOT): 31.5 ml/m2  PA acc time: 0.18 sec  TR max werner: 170.0 cm/sec  TR max P.6 mmHg  E/E' av.0  Lateral E/e': 7.6  Medial E/e': 10.3     ______________________________________________________________________________  Report approved by: Dr. David Brown 2022 01:24 PM         Heparin Unfractionated Anti Xa Level   Result Value Ref Range    Anti Xa Unfractionated Heparin <0.10 For Reference Range, See Comment IU/mL    Narrative    Therapeutic Range: UFH: 0.25-0.50 IU/mL for low intensity dosing,  0.30-0.70 IU/mL for high intensity dosing DVT and PE.  This test is not validated for other direct factor X inhibitors (e.g. rivaroxaban, apixaban, edoxaban, betrixaban, fondaparinux) and should not be used for monitoring of other medications.   Potassium   Result Value Ref Range    Potassium 4.1 3.4 - 5.3 mmol/L     This note was completed in part using Dragon voice recognition software. Although reviewed after completion, some word and grammatical errors may occur.

## 2022-03-31 NOTE — PROVIDER NOTIFICATION
Pt returned from Cath lab 14:00. This writer did not feel a radial pulse. Naz from Cath lab stated she could feel a ulnar but not a radial. This writer spoke with Dr. Clarke re: this issue and he will come and assess the patient.       14:55  Dr. Clarke was able to palpate a radial pulse. This information given to Tanisha SINGH RN

## 2022-03-31 NOTE — PLAN OF CARE
VSS.  AOX4.  Breathing is unlabored and pt denies SOB.  Pt denies pain. Independent in room.  Regular diet.    Pt had angio with no intervention; please see notes.    TR band in place with 10 ml in cuff; radial pulse weak and present; CMS in tact. Both patient hands are cold/same temp.      Pt running fluids at 250 ml/hr for one hour per verbal order from cardiology; will resume at 150 ml/hr after bolus.    Tele: SR with tall T's.

## 2022-03-31 NOTE — PLAN OF CARE
VSS. AO x4. Headache 5/10, PRN tylenol given. Complains of nausea, but no episodes of emesis. IV ativan given. Clear liquid diet tolerated. Increased appetite throughout the shift. Up independent in room.

## 2022-03-31 NOTE — PROGRESS NOTES
DARLENE giving report: Naz COLON receiving report:      S:  Procedure performed: Cor Angio    B:  Why procedure needed: Admitted with N/V after traveling- Trops elevated    A:  Assessment of area:  np blockages seen- no interventions needed  Right Rad  Access- TR band on 10ml at 1350    R:  Recommendations:  Follow up Echo in a few months-      Family updated:no one per PT

## 2022-03-31 NOTE — PROGRESS NOTES
"Patient alert and oriented x3, disoriented to time. Lung sounds clear. Bowel sounds audible. Tele: SR. Denies pain. BP 90/59 (BP Location: Right arm)   Pulse 80   Temp 98.3  F (36.8  C) (Oral)   Resp 18   Ht 1.702 m (5' 7\")   Wt 65.7 kg (144 lb 13.5 oz)   SpO2 100%   BMI 22.69 kg/m    Will continue to monitor per plan of care.   "

## 2022-04-01 ENCOUNTER — ALLIED HEALTH/NURSE VISIT (OUTPATIENT)
Dept: NURSING | Facility: CLINIC | Age: 42
End: 2022-04-01
Payer: COMMERCIAL

## 2022-04-01 ENCOUNTER — TELEPHONE (OUTPATIENT)
Dept: INTERNAL MEDICINE | Facility: CLINIC | Age: 42
End: 2022-04-01

## 2022-04-01 VITALS
OXYGEN SATURATION: 100 % | RESPIRATION RATE: 18 BRPM | TEMPERATURE: 98 F | WEIGHT: 144.84 LBS | HEIGHT: 67 IN | DIASTOLIC BLOOD PRESSURE: 50 MMHG | SYSTOLIC BLOOD PRESSURE: 93 MMHG | HEART RATE: 75 BPM | BODY MASS INDEX: 22.73 KG/M2

## 2022-04-01 DIAGNOSIS — Z53.9 DIAGNOSIS FOR ++++ WALK IN CLINIC ++++: Primary | ICD-10-CM

## 2022-04-01 DIAGNOSIS — K52.9 GASTROENTERITIS: ICD-10-CM

## 2022-04-01 DIAGNOSIS — G47.00 INSOMNIA, UNSPECIFIED TYPE: Primary | ICD-10-CM

## 2022-04-01 LAB — POTASSIUM BLD-SCNC: 4.5 MMOL/L (ref 3.4–5.3)

## 2022-04-01 PROCEDURE — 258N000003 HC RX IP 258 OP 636: Performed by: PHYSICIAN ASSISTANT

## 2022-04-01 PROCEDURE — 250N000013 HC RX MED GY IP 250 OP 250 PS 637: Performed by: PHYSICIAN ASSISTANT

## 2022-04-01 PROCEDURE — 36415 COLL VENOUS BLD VENIPUNCTURE: CPT | Performed by: INTERNAL MEDICINE

## 2022-04-01 PROCEDURE — 250N000013 HC RX MED GY IP 250 OP 250 PS 637: Performed by: INTERNAL MEDICINE

## 2022-04-01 PROCEDURE — 84132 ASSAY OF SERUM POTASSIUM: CPT | Performed by: INTERNAL MEDICINE

## 2022-04-01 PROCEDURE — 250N000011 HC RX IP 250 OP 636: Performed by: PHYSICIAN ASSISTANT

## 2022-04-01 PROCEDURE — 99239 HOSP IP/OBS DSCHRG MGMT >30: CPT | Performed by: STUDENT IN AN ORGANIZED HEALTH CARE EDUCATION/TRAINING PROGRAM

## 2022-04-01 PROCEDURE — 99232 SBSQ HOSP IP/OBS MODERATE 35: CPT | Performed by: INTERNAL MEDICINE

## 2022-04-01 PROCEDURE — C9113 INJ PANTOPRAZOLE SODIUM, VIA: HCPCS | Performed by: PHYSICIAN ASSISTANT

## 2022-04-01 RX ORDER — CARVEDILOL 3.12 MG/1
3.12 TABLET ORAL 2 TIMES DAILY WITH MEALS
Qty: 60 TABLET | Refills: 1 | Status: SHIPPED | OUTPATIENT
Start: 2022-04-01 | End: 2023-09-13

## 2022-04-01 RX ORDER — ZOLPIDEM TARTRATE 5 MG/1
5 TABLET ORAL
Qty: 15 TABLET | Refills: 0 | Status: SHIPPED | OUTPATIENT
Start: 2022-04-01 | End: 2023-06-20

## 2022-04-01 RX ADMIN — LORAZEPAM 0.5 MG: 0.5 TABLET ORAL at 02:07

## 2022-04-01 RX ADMIN — POTASSIUM CHLORIDE, DEXTROSE MONOHYDRATE AND SODIUM CHLORIDE: 150; 5; 450 INJECTION, SOLUTION INTRAVENOUS at 00:25

## 2022-04-01 RX ADMIN — CARVEDILOL 3.12 MG: 3.12 TABLET, FILM COATED ORAL at 09:59

## 2022-04-01 RX ADMIN — PANTOPRAZOLE SODIUM 40 MG: 40 INJECTION, POWDER, FOR SOLUTION INTRAVENOUS at 09:54

## 2022-04-01 RX ADMIN — ASPIRIN 81 MG CHEWABLE TABLET 81 MG: 81 TABLET CHEWABLE at 09:54

## 2022-04-01 RX ADMIN — ZOLPIDEM TARTRATE 5 MG: 5 TABLET ORAL at 00:03

## 2022-04-01 ASSESSMENT — ACTIVITIES OF DAILY LIVING (ADL)
ADLS_ACUITY_SCORE: 12

## 2022-04-01 NOTE — PROGRESS NOTES
[facility]    Cardiology Progress Note    Date of Admission: 03/29/2022  Service Date: 04/01/2022    Summary:  Ms. Humera Lovell is a very pleasant 41 year old female with no significant past medical history who was admitted on 3/29/2022 after presenting with epigastric pain and recurrent intractable nausea and vomiting in the setting of recent travel to [place]. Cardiology was consulted for troponin elevation.    Interval History   No acute events overnight. VSS. She is feeling a bit better this morning with improvement in her nausea and epigastric pain. She otherwise denies chest pain, palpitations, or shortness of breath. No concerns with the radial access site. We reviewed her coronary angiogram findings as outlined below showing normal coronaries. We discussed indication for cardiac MRI to reassess LV function, WMA, and further assess for possible myopericarditis. Care of the angiogram site was reviewed.    Telemetry: NSR    Assessment & Plan   1. Troponin elevation; NSTEMI  - HS Troponin elevated to 2,345.   - Suspect type 2 NSTEMI in the setting of intractable nausea and vomiting. Myopericarditis is another possibility associated with suspected viral gastritis as CRP inflammation is elevated at 21.1. ESR WNL.  - TTE 3/29/22 showing LVEF 55-60% with moderate to severe inferolateral wall hypokinesis. Grade I or early diastolic dysfunction noted. No pericardial effusion.  - Coronary angiogram 3/31 showing normal coronaries.     2. Intractable nausea and vomiting, suspected viral gastritis    3. Hypokalemia, secondary to nausea and vomiting     4. Epigastric pain    Plan:   1. Continue low dose beta blocker with carvedilol 3.125 mg BID. Okay to stop aspirin and statin.  2. Recommend cardiac MRI in follow up to further evaluate for possible myopericarditis and reassess LV function and wall motion.   3. Could consider empiric course of colchicine and ibuprofen but could be challenging to  tolerate given recent issues with nausea and vomiting.   4. Will arrange follow up with a cardiology BRUCE in 1-2 weeks post discharge with the cardiac MRI beforehand.   5. Okay from a cardiac standpoint for discharge home today.      I spent 40 minutes face-to-face or coordinating care of Humera LARSEN Nas. Over 50% of our time on the unit was spent counseling the patient and/or coordinating care.    Thank you for the opportunity to participate in this pleasant patient's care.     CHONG Lewis, CNP   Nurse Practitioner  [facility]  Pager: [phone]  Text Page  (8am - 5pm, M-F)    Patient Active Problem List   Diagnosis     Epigastric pain     NSTEMI (non-ST elevated myocardial infarction) (H)     Chronic gastritis, presence of bleeding unspecified, unspecified gastritis type     Intractable vomiting with nausea, unspecified vomiting type     Physical Exam   Temp: 97.4  F (36.3  C) Temp src: Oral BP: 101/64 Pulse: 73   Resp: 16 SpO2: 100 % O2 Device: None (Room air) Oxygen Delivery: 2 LPM  Vitals:    03/29/22 1628   Weight: 65.7 kg (144 lb 13.5 oz)     Vital Signs with Ranges  Temp:  [97.4  F (36.3  C)-98.6  F (37  C)] 97.4  F (36.3  C)  Pulse:  [60-85] 73  Resp:  [16-20] 16  BP: ()/(45-95) 101/64  SpO2:  [100 %] 100 %  I/O last 3 completed shifts:  In: 240 [P.O.:240]  Out: 400 [Urine:400]    Constitutional: Appears her stated age, well nourished, and in no acute distress.  Eyes: Pupils equal, round. Sclerae anicteric.   HEENT: Normocephalic, atraumatic.   Respiratory: Breathing non-labored. Lungs clear to auscultation bilaterally. No crackles, wheezes, rhonchi, or rales.  Cardiovascular: Regular rate and rhythm, normal S1 and S2. No murmur, rub, or gallop. Radial pulses are full and equal bilaterally. Right radial site is soft, non-tender, and free of erythema, ecchymosis, or drainage.  Skin: Warm, dry. No apparent rashes.  Musculoskeletal/Extremities: Moves all extremities well and  symmetrically. No edema.  Neurologic: No gross focal deficits. Alert, awake, and oriented to person, place and time.  Psychiatric: Affect appropriate. Mentation normal.    Medications     Continuing ACE inhibitor/ARB/ARNI from home medication list OR ACE inhibitor/ARB order already placed during this visit       - MEDICATION INSTRUCTIONS -       dextrose 5% and 0.45% NaCl + KCl 20 mEq/L 100 mL/hr at 04/01/22 0025     - MEDICATION INSTRUCTIONS -       - MEDICATION INSTRUCTIONS -       BETA BLOCKER NOT PRESCRIBED         aspirin  81 mg Oral Daily     carvedilol  3.125 mg Oral BID w/meals     pantoprazole (PROTONIX) IV  40 mg Intravenous Daily with breakfast     scopolamine  1 patch Transdermal Q72H    And     scopolamine   Transdermal Q8H     simvastatin  5 mg Oral QPM     Data   Results for orders placed or performed during the hospital encounter of 03/29/22 (from the past 24 hour(s))   Erythrocyte sedimentation rate auto   Result Value Ref Range    Erythrocyte Sedimentation Rate 10 0 - 20 mm/hr   Cardiac Catheterization    Narrative    Normal coronary arteries   Potassium   Result Value Ref Range    Potassium 4.5 3.4 - 5.3 mmol/L     This note was completed in part using Dragon voice recognition software. Although reviewed after completion, some word and grammatical errors may occur.

## 2022-04-01 NOTE — PLAN OF CARE
Soft BPs otherwise VSS on RA.  A/Ox4.  Denies pain and nausea.  LS clear, denies SOB.  Tele: SR, denies CP.  TR band removed w/o difficulty, angio site WDL.  IVF infusing at 100ml/hr in L PIV.  Tolerating reg diet.  Anticipatory discharge tomm.  Continue POC.

## 2022-04-01 NOTE — TELEPHONE ENCOUNTER
Patient walked into clinic following hospital discharge. Advised her that Beatriz did approve a small quantity of Ambien for her and sent this to [facility]. CSA signed by patient. She scheduled Virtual Visit for hospital follow-up with Beatriz on 4/7/22, she will go to [facility] lab the day before to have the blood and urine tests done.   Future Appointments 4/1/2022 - 9/28/2022              Date Visit Type Length Department Provider     4/7/2022 11:00 AM ED/HOSP FOLLOW UP 30 min Beatriz Solano APRN CNP Melissa Ropella, RN  [facility]    
Reason for Call:  Other prescription    Detailed comments: wants to go back on Ambien, she is getting discharged from the hospital and has not been sleeping well.      Phone Number Patient can be reached at: Home number on file [phone] (home)    Best Time: any    Can we leave a detailed message on this number? YES    Call taken on 4/1/2022 at 1:03 PM by Shiresa H. Ormond  '  
Would need urine tox and sign CSA   Also a follow up appointment as last seen 4/2021  Will fill 15 pills so she can get above done     She is to have lab 7 days past discharge so those are also ordered and can do urine with them    
shortness of breath

## 2022-04-01 NOTE — PROGRESS NOTES
Patient came to clinic after being discharged from hospital. She called the clinic earlier today about getting small quantity of Ambien to help with sleeping. See 4/1/22 telephone encounter for further details.     Gisele Johnson RN  [facility]

## 2022-04-01 NOTE — PLAN OF CARE
Temp: 98.6  F (37  C) Temp src: Oral BP: 100/62 Pulse: 85   Resp: 19 SpO2: 100 % O2 Device: None (Room air)     VSS. Denies pain, nausea, SOB. Tele SR. LS clear. R wrist angio site WDL, CMS intact. Ambien given for sleep, prn ativan for anxiety. Pt slept poorly this shift. IVF @100ml/hr to R PIV.

## 2022-04-01 NOTE — LETTER
[facility]  04/01/22  Patient: Humera DALLAS [facility]  YOB: 1980  Medical Record Number: [id number]                                                                                  Non-Opioid Controlled Substance Agreement    This is an agreement between you and your provider regarding safe and appropriate use of controlled substances prescribed by your care team. Controlled substances are?medicines that can cause physical and mental dependence (abuse).     There are strict laws about having and using these medicines. We here at [facility] are  committed to working with you in your efforts to get better. To support you in this work, we'll help you schedule regular office appointments for medicine refills. If we must cancel or change your appointment for any reason, we'll make sure you have enough medicine to last until your next appointment.     As a Provider, I will:     Listen carefully to your concerns while treating you with respect.     Recommend a treatment plan that I believe is in your best interest and may involve therapies other than medicine.      Talk with you often about the possible benefits and the risk of harm of any medicine that we prescribe for you.    Assess the safety of this medicine and check how well it works.      Provide a plan on how to taper (discontinue or go off) using this medicine if the decision is made to stop its use.      ::  As a Patient, I understand controlled substances:       Are prescribed by my care provider to help me function or work and manage my condition(s).?    Are strong medicines and can cause serious side effects.       Need to be taken exactly as prescribed.?Combining controlled substances with certain medicines or chemicals (such as illegal drugs, alcohol, sedatives, sleeping pills, and benzodiazepines) can be dangerous or even fatal.? If I stop taking my medicines suddenly, I may have severe withdrawal symptoms.     The  risks, benefits, and side effects of these medicine(s) were explained to me. I agree that:    1. I will take part in other treatments as advised by my care team. This may be psychiatry or counseling, physical therapy, behavioral therapy, group treatment or a referral to specialist.    2. I will keep all my appointments and understand this is part of the monitoring of controlled substances.?My care team may require an office visit for EVERY controlled substance refill. If I miss appointments or don t follow instructions, my care team may stop my medicine    3. I will take my medicines as prescribed. I will not change the dose or schedule unless my care team tells me to. There will be no refills if I run out early.      4. I may be asked to come to the clinic and complete a urine drug test or complete a pill count. If I don t give a urine sample or participate in a pill count, the care team may stop my medicine.    5. I will only receive controlled substance prescriptions from this clinic. If I am treated by another provider, I will tell them that I am taking controlled substances and that I have a treatment agreement with this provider. I will inform my [facility] care team within one business day if I am given a prescription for any controlled substance by another healthcare provider. My [facility] care team can contact other providers and pharmacists about my use of any medicines.    6. It is up to me to make sure that I don't run out of my medicines on weekends or holidays.?If my care team is willing to refill my prescription without a visit, I must request refills only during office hours. Refills may take up to 3 business days to process. I will use one pharmacy to fill all my controlled substance prescriptions. I will notify the clinic about any changes to my insurance or medicine availability.    7. I am responsible for my prescriptions. If the medicine/prescription is lost, stolen or destroyed,  it will not be replaced.?I also agree not to share controlled substance medicines with anyone.     8. I am aware I should not use any illegal or recreational drugs. I agree not to drink alcohol unless my care team says I can.     9. If I enroll in the [place] Medical Cannabis program, I will tell my care team before my next refill.    10. I will tell my care team right away if I become pregnant, have a new medical problem treated outside of my regular clinic, or have a change in my medicines.     11. I understand that this medicine can affect my thinking, judgment and reaction time.? Alcohol and drugs affect the brain and body, which can affect the safety of my driving. Being under the influence of alcohol or drugs can affect my decision-making, behaviors, personal safety and the safety of others. Driving while impaired (DWI) can occur if a person is driving, operating or in physical control of a car, motorcycle, boat, snowmobile, ATV, motorbike, off-road vehicle or any other motor vehicle ([place] Statute 169A.20). I understand the risk if I choose to drive or operate any vehicle or machinery.    I understand that if I do not follow any of the conditions above, my prescriptions or treatment may be stopped or changed.   I agree that my provider, clinic care team and pharmacy may work with any city, state or federal law enforcement agency that investigates the misuse, sale or other diversion of my controlled medicine. I will allow my provider to discuss my care with, or share a copy of, this agreement with any other treating provider, pharmacy or emergency room where I receive care.     I have read this agreement and have asked questions about anything I did not understand.    ________________________________________________________  Patient Signature - Humera CELENA [facility]     ___________________                   Date     ________________________________________________________  Provider Signature - Beatriz Cifuentes APRN  CNP       ___________________                   Date     ________________________________________________________  Witness Signature (required if provider not present while patient signing)          ___________________                   Date

## 2022-04-01 NOTE — DISCHARGE SUMMARY
[facility]  Hospitalist Discharge Summary      Date of Admission:  3/29/2022  Date of Discharge:  4/1/2022  Discharging Provider: Michael King MD  Discharge Service: Hospitalist Service    Discharge Diagnoses       Troponin elevation, likely myocarditis.    Inferior lateral wall hypokinesis on echo, clean coronary angiogram.    Intractable nausea and vomiting.  Likely viral gastroenteritis.    Hypokalemia, resolved.      Follow-ups Needed After Discharge   Follow-up Appointments     Follow-up and recommended labs and tests       Follow up with primary care provider, Beatriz Cifuentes, within 7 days   for hospital follow- up.  The following labs/tests are recommended: CBC   and BMP.    RN discharge follow up clinic visit with cardiology ([facility] only)   within 14 days. Cardiac MRI to be arranged by cardiology             Unresulted Labs Ordered in the Past 30 Days of this Admission     Date and Time Order Name Status Description    3/29/2022  4:13 PM Blood Culture Arm, Right Preliminary     3/29/2022  4:12 PM Blood Culture Peripheral Blood Preliminary       These results will be followed up by Michael King      Discharge Disposition   Discharged to home  Condition at discharge: Stable      Hospital Course   41-year-old female with no significant medical history who was at a resort in [place] 3/23-3/27 where she developed nausea, vomiting and diarrhea and needed to be hospitalized at the local hospital where she got only IV fluids.  She flew back to [place] on 3/27 and came to ER and had only mild hypokalemia with normal kidney function and was discharged home after IV fluids and Zofran.  24 hours after discharge, she had return of nausea and vomited and came back to ER on 3/29.     Systolic blood pressure was in 180s with mild hypokalemia but interestingly her troponin was elevated at 2186 with EKG showing no ischemic changes.  She was started on heparin  drip.     Plan     1. Elevated troponin.    Patient is echocardiogram showed  localized inferior lateral wall motion abnormalities but had clean coronary angiogram.  There was mild elevation in CRP and it could be myocarditis associated with the viral infection.  Patient will follow up with cardiology outpatient and get cardiac MRI.    We will start on low-dose carvedilol  2. Intractable nausea and vomiting with few episodes of diarrhea with recent travel.    Likely had viral gastroenteritis.  Diarrhea and nausea/vomiting have now resolved.  3. Hypokalemia.  Secondary to nausea, vomiting and diarrhea.  Replaced.      Consultations This Hospital Stay   PHARMACY IP CONSULT  PHARMACY IP CONSULT  CARDIOLOGY IP CONSULT  PHARMACY IP CONSULT  PHARMACY IP CONSULT  SMOKING CESSATION PROGRAM IP CONSULT    Code Status   Full Code    Time Spent on this Encounter   I, Michael King MD, personally saw the patient today and spent greater than 30 minutes discharging this patient.       Michael King MD  [place] [id number] [facility]  [address] [place] [address]  Phone: [phone]  Fax: [phone]  ______________________________________________________________________    Physical Exam   Vital Signs: Temp: 97.4  F (36.3  C) Temp src: Oral BP: 101/64 Pulse: 73   Resp: 16 SpO2: 100 % O2 Device: None (Room air) Oxygen Delivery: 2 LPM  Weight: 144 lbs 13.48 oz  General Appearance: Alert and awake, in no acute distress.  Respiratory: Clear to auscultation  Cardiovascular: S1 is normal  GI: Nontender  Skin: No rash  Other: No edema       Primary Care Physician   Beatriz Cifuentes    Discharge Orders      Primary Care - Care Coordination Referral      Reason for your hospital stay    Nausea vomiting, possible myocarditis.     Follow-up and recommended labs and tests     Follow up with primary care provider, Beatriz Cifuentes, within 7 days for hospital follow- up.  The following labs/tests are  recommended: CBC and BMP.    RN discharge follow up clinic visit with cardiology ([facility]) within 14 days. Cardiac MRI to be arranged by cardiology     Activity    Your activity upon discharge: activity as tolerated     Diet    Follow this diet upon discharge: Orders Placed This Encounter      Advance Diet as Tolerated: Regular Diet Adult       Significant Results and Procedures   Most Recent 3 CBC's:Recent Labs   Lab Test 22  0554 22  1318 22  2200   WBC 5.5 5.9 9.3   HGB 13.6 15.0 11.9    101* 101*    316 297     Most Recent 3 BMP's:Recent Labs   Lab Test 22  0720 22  0651 22  0554 22  1959 22  1459 22  2200   NA  --   --  134  --  134 133   POTASSIUM 4.5 4.1 3.8  3.8   < > 3.1* 3.0*   CHLORIDE  --   --  101  --  100 99   CO2  --   --  28  --  28 29   BUN  --   --  3*  --  5* 11   CR  --   --  0.61  --  0.68 0.61   ANIONGAP  --   --  5  --  6 5   ANAHY  --   --  8.7  --  8.1* 9.1   GLC  --   --  150*  --  107* 124*    < > = values in this interval not displayed.     Most Recent 2 LFT's:Recent Labs   Lab Test 22  1459 220   AST 29 37   ALT 40 37   ALKPHOS 62 57   BILITOTAL 0.6 0.6   ,   Results for orders placed or performed during the hospital encounter of 22   XR Chest 2 Views    Narrative    CHEST TWO VIEW   3/29/2022 4:44 PM     HISTORY: Chest pain, recurrent vomiting, NSTEMI.    COMPARISON: None.      Impression    IMPRESSION: No acute cardiopulmonary disease.    DARIUSZ MCFARLAND MD         SYSTEM ID:  [id number]   Echocardiogram Complete     Value    LVEF  55-60%    Narrative    [id number]  [id number]  [id number]  [id number]^MCCONNELL^JEANNA^RAY     [facility]  Echocardiography Laboratory  [address] [place] [address]     Name: NINOSKA MAHAN  MRN: [id number]  : 1980  Study Date: 2022 10:55 AM  Age: 41 yrs  Gender: Female  Patient Location: [facility]  Reason For Study: Chest Pain, Chest  Tightness, Chest Pressure  Ordering Physician: JEANNA MCCONNELL  Referring Physician: Beatriz Cifuentes  Performed By: Minoo Bermeo RDCS     BSA: 1.8 m2  Height: 67 in  Weight: 144 lb  HR: 64  BP: 174/103 mmHg  ______________________________________________________________________________  Procedure  Complete Portable Echo Adult. Optison (NDC #[id number]) given intravenously.  ______________________________________________________________________________  Interpretation Summary     There is moderate to severe inferolateral wall hypokinesis.  Left ventricular systolic function is normal.  The visual ejection fraction is 55-60%.  The left ventricle is normal in size.  The right ventricle is normal in structure, function and size.  There is mild (1+) mitral regurgitation.     No old studies avaiable for comparion.  ______________________________________________________________________________  Left Ventricle  The left ventricle is normal in size. There is normal left ventricular wall  thickness. Left ventricular systolic function is normal. The visual ejection  fraction is 55-60%. Grade I or early diastolic dysfunction. There is moderate  to severe inferolateral wall hypokinesis. There is no thrombus seen in the  left ventricle.     Right Ventricle  The right ventricle is normal in structure, function and size. There is no  mass or thrombus in the right ventricle.     Atria  Normal left atrial size. Right atrial size is normal. There is no atrial shunt  seen. The left atrial appendage is not well visualized.     Mitral Valve  The mitral valve leaflets appear normal. There is no evidence of stenosis,  fluttering, or prolapse. There is mild (1+) mitral regurgitation. There is no  mitral valve stenosis.     Tricuspid Valve  Normal tricuspid valve. The right ventricular systolic pressure is  approximated at 11.6 mmHg plus the right atrial pressure. Right ventricle  systolic pressure estimate normal. There is mild (1+)  tricuspid regurgitation.  There is no tricuspid stenosis.     Aortic Valve  The aortic valve is trileaflet. No aortic regurgitation is present. No aortic  stenosis is present.     Pulmonic Valve  Normal pulmonic valve. There is no pulmonic valvular regurgitation. There is  no pulmonic valvular stenosis.     Vessels  The aortic root is normal size. Normal size ascending aorta. The inferior vena  cava is normal. The pulmonary artery is normal size.     Pericardium  The pericardium appears normal. There is no pleural effusion.     Rhythm  Sinus rhythm was noted.  ______________________________________________________________________________  MMode/2D Measurements & Calculations  IVSd: 0.65 cm     LVIDd: 4.7 cm  LVIDs: 3.8 cm  LVPWd: 0.76 cm  FS: 17.8 %  LV mass(C)d: 102.1 grams  LV mass(C)dI: 58.1 grams/m2  Ao root diam: 2.5 cm  LA dimension: 2.7 cm  asc Aorta Diam: 2.6 cm  LA/Ao: 1.1  LVOT diam: 2.1 cm  LVOT area: 3.5 cm2  LA Volume (BP): 32.5 ml  LA Volume Index (BP): 18.5 ml/m2  RWT: 0.32     Doppler Measurements & Calculations  MV E max werner: 41.9 cm/sec  MV A max werner: 52.9 cm/sec  MV E/A: 0.79  MV max P.7 mmHg  MV mean P.0 mmHg  MV V2 VTI: 13.8 cm  MVA(VTI): 4.0 cm2  MV dec time: 0.19 sec  LV V1 max P.6 mmHg  LV V1 max: 80.7 cm/sec  LV V1 VTI: 16.0 cm  SV(LVOT): 55.4 ml  SI(LVOT): 31.5 ml/m2  PA acc time: 0.18 sec  TR max werner: 170.0 cm/sec  TR max P.6 mmHg  E/E' av.0  Lateral E/e': 7.6  Medial E/e': 10.3     ______________________________________________________________________________  Report approved by: Dr. David Brown 2022 01:24 PM         Cardiac Catheterization    Narrative    Normal coronary arteries       Discharge Medications   Current Discharge Medication List      START taking these medications    Details   carvedilol (COREG) 3.125 MG tablet Take 1 tablet (3.125 mg) by mouth 2 times daily (with meals)  Qty: 60 tablet, Refills: 1    Associated Diagnoses: Acute idiopathic  myocarditis         CONTINUE these medications which have NOT CHANGED    Details   fluticasone (FLONASE) 50 MCG/ACT nasal spray Spray 1 spray into both nostrils daily  Qty: 16 g, Refills: 11    Associated Diagnoses: Seasonal allergic rhinitis, unspecified trigger      hydrOXYzine (ATARAX) 25 MG tablet TAKE 1 TO 2 TABLETS(25 TO 50 MG) BY MOUTH THREE TIMES DAILY AS NEEDED FOR ITCHING  Qty: 90 tablet, Refills: 1    Associated Diagnoses: Anxiety; Mild episode of recurrent major depressive disorder (H)      Melatonin ([employer] MELATONIN GUMMIES) 5 MG CHEW Take 5 mg by mouth At Bedtime         STOP taking these medications       ondansetron (ZOFRAN-ODT) 4 MG ODT tab Comments:   Reason for Stopping:             Allergies   No Known Allergies

## 2022-04-01 NOTE — PLAN OF CARE
10:05  Spoke with MD.  He is planning tho discharge pt today.  OK to stop IVF and discontinue IV.  Also ok to give AM dose coreg.  /64    13:00 No c/o pain. Alert and oriented. Independent with ambulation in room.  Lung sounds clear.  100% RA.  Tele: SR.  Regular diet.  Able to eat.  Denies nausea or vomiting. Voiding without difficulty.  Cardiology following.  Angio site right wrist clean, dry intact. Pt to discharge this afternoon.    13:20  Pt discharged home at this time.  She verbalizes understanding of discharge instructions and medications. Pt acknowledges receipt of all belongings.

## 2022-04-02 DIAGNOSIS — Z71.89 OTHER SPECIFIED COUNSELING: ICD-10-CM

## 2022-04-03 ENCOUNTER — PATIENT OUTREACH (OUTPATIENT)
Dept: CARE COORDINATION | Facility: CLINIC | Age: 42
End: 2022-04-03
Payer: COMMERCIAL

## 2022-04-03 LAB
BACTERIA BLD CULT: NO GROWTH
BACTERIA BLD CULT: NO GROWTH

## 2022-04-03 NOTE — PROGRESS NOTES
"Clinic Care Coordination Contact  [facility]: Post-Discharge Note  SITUATION                                                      Admission:    Admission Date: 03/29/22   Reason for Admission: Nausea, Vomiting, & Diarrhea  Discharge:   Discharge Date: 04/01/22  Discharge Diagnosis: Vomiting and diarrhea +1 more    BACKGROUND                                                      Per hospital discharge summary and inpatient provider notes:    Humera Lovell is a 41 year old female who presents with nausea, vomiting and diarrhea.  Started to get ill on Friday.  She was in [place] and went to the ED there for treatment.  Diarrhea has resolved but continues to have lots of vomiting and dry heaving.  She reports her stomach is sore from being sick.  She denies chest pain, shortness of breath, rash.  She has felt hot and cold.  She presents straight from the airport.  She denies recent antibiotics.  Reports smoking weed on Wednesday.  No alcohol use.     ASSESSMENT      Enrollment  Primary Care Care Coordination Status: Declined    Discharge Assessment  How are you doing now that you are home?: \"I'm doing well much better\"  How are your symptoms? (Red Flag symptoms escalate to triage hotline per guidelines): Improved  Do you feel your condition is stable enough to be safe at home until your provider visit?: Yes  Does the patient have their discharge instructions? : Yes  Does the patient have questions regarding their discharge instructions? : Yes (see comment) (Patient had a question about activity. CHW reviewed AVS with patient.)  Were you started on any new medications or were there changes to any of your previous medications? : Yes  Does the patient have all of their medications?: Yes  Do you have questions regarding any of your medications? : No  Do you have all of your needed medical supplies or equipment (DME)?  (i.e. oxygen tank, CPAP, cane, etc.): Yes  Discharge follow-up appointment scheduled within 14 calendar " days? : Yes  Discharge Follow Up Appointment Date: 04/07/22  Discharge Follow Up Appointment Scheduled with?: Primary Care Provider    Post-op (CHW CTA Only)  If the patient had a surgery or procedure, do they have any questions for a nurse?: No    PLAN                                                      Outpatient Plan:       Follow-Ups: Schedule an appointment with Beatriz Cifuentes APRN CNP (Internal Medicine) in 2 days (3/30/2022)      Future Appointments   Date Time Provider Department Center   4/7/2022 11:00 AM Beatriz Cifuentes APRN CNP [facility]       For any urgent concerns, please contact our 24 hour nurse triage line: [phone] ([phone])         BUD Nnues  [phone]  [facility]

## 2022-04-05 ENCOUNTER — LAB (OUTPATIENT)
Dept: LAB | Facility: CLINIC | Age: 42
End: 2022-04-05
Payer: COMMERCIAL

## 2022-04-05 ENCOUNTER — TELEPHONE (OUTPATIENT)
Dept: CARDIOLOGY | Facility: CLINIC | Age: 42
End: 2022-04-05
Payer: COMMERCIAL

## 2022-04-05 DIAGNOSIS — K52.9 GASTROENTERITIS: ICD-10-CM

## 2022-04-05 DIAGNOSIS — G47.00 INSOMNIA, UNSPECIFIED TYPE: ICD-10-CM

## 2022-04-05 PROBLEM — Z79.899 CONTROLLED SUBSTANCE AGREEMENT SIGNED: Status: ACTIVE | Noted: 2022-04-05

## 2022-04-05 LAB
ANION GAP SERPL CALCULATED.3IONS-SCNC: 5 MMOL/L (ref 3–14)
BASOPHILS # BLD AUTO: 0.1 10E3/UL (ref 0–0.2)
BASOPHILS NFR BLD AUTO: 1 %
BUN SERPL-MCNC: 13 MG/DL (ref 7–30)
CALCIUM SERPL-MCNC: 9.6 MG/DL (ref 8.5–10.1)
CANNABINOIDS UR QL SCN: ABNORMAL
CHLORIDE BLD-SCNC: 102 MMOL/L (ref 94–109)
CO2 SERPL-SCNC: 28 MMOL/L (ref 20–32)
CREAT SERPL-MCNC: 0.97 MG/DL (ref 0.52–1.04)
CREAT UR-MCNC: 106 MG/DL
EOSINOPHIL # BLD AUTO: 0.1 10E3/UL (ref 0–0.7)
EOSINOPHIL NFR BLD AUTO: 3 %
ERYTHROCYTE [DISTWIDTH] IN BLOOD BY AUTOMATED COUNT: 11.8 % (ref 10–15)
GFR SERPL CREATININE-BSD FRML MDRD: 75 ML/MIN/1.73M2
GLUCOSE BLD-MCNC: 72 MG/DL (ref 70–99)
HCT VFR BLD AUTO: 41.3 % (ref 35–47)
HGB BLD-MCNC: 13.9 G/DL (ref 11.7–15.7)
IMM GRANULOCYTES # BLD: 0 10E3/UL
IMM GRANULOCYTES NFR BLD: 0 %
LYMPHOCYTES # BLD AUTO: 1.9 10E3/UL (ref 0.8–5.3)
LYMPHOCYTES NFR BLD AUTO: 44 %
MCH RBC QN AUTO: 35.3 PG (ref 26.5–33)
MCHC RBC AUTO-ENTMCNC: 33.7 G/DL (ref 31.5–36.5)
MCV RBC AUTO: 105 FL (ref 78–100)
MONOCYTES # BLD AUTO: 0.6 10E3/UL (ref 0–1.3)
MONOCYTES NFR BLD AUTO: 13 %
NEUTROPHILS # BLD AUTO: 1.7 10E3/UL (ref 1.6–8.3)
NEUTROPHILS NFR BLD AUTO: 39 %
NRBC # BLD AUTO: 0 10E3/UL
NRBC BLD AUTO-RTO: 0 /100
PLATELET # BLD AUTO: 319 10E3/UL (ref 150–450)
POTASSIUM BLD-SCNC: 3.5 MMOL/L (ref 3.4–5.3)
RBC # BLD AUTO: 3.94 10E6/UL (ref 3.8–5.2)
SODIUM SERPL-SCNC: 135 MMOL/L (ref 133–144)
WBC # BLD AUTO: 4.3 10E3/UL (ref 4–11)

## 2022-04-05 PROCEDURE — 36415 COLL VENOUS BLD VENIPUNCTURE: CPT

## 2022-04-05 PROCEDURE — 80048 BASIC METABOLIC PNL TOTAL CA: CPT

## 2022-04-05 PROCEDURE — 80307 DRUG TEST PRSMV CHEM ANLYZR: CPT

## 2022-04-05 PROCEDURE — 85025 COMPLETE CBC W/AUTO DIFF WBC: CPT

## 2022-04-05 NOTE — TELEPHONE ENCOUNTER
Writer returned pt's phone message. Pt denies any continued N/V, epigastric pain, denies chest pain or SOB. RRA access site is healing without signs of infection, swelling or bleeding. Pt is now scheduled for cMRI on 4/19/22 at 0800 and an OV with BRUCE Héctor Treviño on 3/26/22 at 1500 at our [place] Office. No medication questions. Pt verbalized understanding of all instructions and states she is feeling much better. PAUL Ray RN.

## 2022-04-05 NOTE — TELEPHONE ENCOUNTER
Patient was evaluated by cardiology while inpatient for intractable N/V, epigastric pain after recent travel to [place]. Elevated troponin-NSTEMI. TTE 3/29/22 showing LVEF 55-60% with moderate to severe inferolateral wall hypokinesis. Grade I or early diastolic dysfunction noted. No pericardial effusion. 3/31/22: Coronary angiogram via RRA showed normal coronary arteries. Recommend OP cMRI to further assess WMA's to rule out myopericarditis. Started on Coreg. No Colchicine at this time secondary to N/V. Writer attempted to call patient to discuss any post hospital d/c questions she may have, review medication changes, and confirm f/u appts, but no answer. VM left to return my phone call. RN left reminder for patient that she needs to be scheduled for cMRI, labs, cardiology BRUCE and cardiologist f/u appt as ordered-central scheduling phone number provided. PAUL Ray RN.

## 2022-04-06 PROBLEM — Z91.148 CONTROLLED SUBSTANCE AGREEMENT TERMINATED: Status: ACTIVE | Noted: 2022-04-05

## 2022-04-07 ENCOUNTER — VIRTUAL VISIT (OUTPATIENT)
Dept: INTERNAL MEDICINE | Facility: CLINIC | Age: 42
End: 2022-04-07
Payer: COMMERCIAL

## 2022-04-07 DIAGNOSIS — F33.0 MILD EPISODE OF RECURRENT MAJOR DEPRESSIVE DISORDER (H): ICD-10-CM

## 2022-04-07 DIAGNOSIS — Z09 HOSPITAL DISCHARGE FOLLOW-UP: Primary | ICD-10-CM

## 2022-04-07 DIAGNOSIS — I51.4 MYOCARDITIS, UNSPECIFIED CHRONICITY, UNSPECIFIED MYOCARDITIS TYPE (H): ICD-10-CM

## 2022-04-07 DIAGNOSIS — G47.00 INSOMNIA, UNSPECIFIED TYPE: ICD-10-CM

## 2022-04-07 DIAGNOSIS — R53.83 OTHER FATIGUE: ICD-10-CM

## 2022-04-07 PROCEDURE — 99495 TRANSJ CARE MGMT MOD F2F 14D: CPT | Performed by: NURSE PRACTITIONER

## 2022-04-07 ASSESSMENT — PATIENT HEALTH QUESTIONNAIRE - PHQ9: SUM OF ALL RESPONSES TO PHQ QUESTIONS 1-9: 6

## 2022-04-07 NOTE — PROGRESS NOTES
"Humera is a 41 year old who is being evaluated via a billable video visit.      How would you like to obtain your AVS? MyChart  If the video visit is dropped, the invitation should be resent by: Text to cell phone: [phone]  Will anyone else be joining your video visit? No    Video Start Time: 10:25 AM    Assessment & Plan     Hospital discharge follow-up       Myocarditis, unspecified chronicity, unspecified myocarditis type (H)  Seeing cardiology and has MRI to do   Feeling well   Just tired     Insomnia, unspecified type  Failed CSA - was taking OTC delta 8 which is illegal in [place] per google   She will stop and wants to recheck urine tox in future    - Adult Sleep Eval & Management  Referral; Future    Other fatigue  Discussed no   - Adult Sleep Eval & Management  Referral; Future    Mild episode of recurrent major depressive disorder (H)  Stable and feeling well         33 minutes spent on the date of the encounter doing chart review, history and exam, documentation and further activities per the note       Patient Instructions   Sleep therapy referral      Return in about 3 months (around 7/7/2022) for Routine preventive.    CHONG [facility]alla [facility]    Subjective      Patient advised: Urine tox positive for marijuana.   We cannot provide ambien for her any more per controlled substance agreement.     Humera is a 41 year old who presents for the following health issues:HPI     Post Discharge Outreach 4/3/2022   Admission Date 3/29/2022   Reason for Admission Nausea, Vomiting, & Diarrhea   Discharge Date 4/1/2022   Discharge Diagnosis Vomiting and diarrhea +1 more   How are you doing now that you are home? \"I'm doing well much better\"   How are your symptoms? (Red Flag symptoms escalate to triage hotline per guidelines) Improved   Do you feel your condition is stable enough to be safe at home until your provider visit? Yes   Does the patient have their " discharge instructions?  Yes   Does the patient have questions regarding their discharge instructions?  Yes (see comment)   Were you started on any new medications or were there changes to any of your previous medications?  Yes   Does the patient have all of their medications? Yes   Do you have questions regarding any of your medications?  No   Do you have all of your needed medical supplies or equipment (DME)?  (i.e. oxygen tank, CPAP, cane, etc.) Yes   Discharge follow-up appointment scheduled within 14 calendar days?  Yes   Discharge Follow Up Appointment Date 4/7/2022   Discharge Follow Up Appointment Scheduled with? Primary Care Provider     Hospital Follow-up Visit:    Hospital/Nursing Home/IP Rehab Facility: [facility]  Date of Admission: 3-  Date of Discharge: 4-1-2022  Reason(s) for Admission: heart      Was your hospitalization related to COVID-19? No   Problems taking medications regularly:  None  Medication changes since discharge: None  Problems adhering to non-medication therapy:  None    Summary of hospitalization:  [facility] discharge summary reviewed  Diagnostic Tests/Treatments reviewed.  Follow up needed: cardiology and mri   Other Healthcare Providers Involved in Patient s Care:         Specialist appointment - cardiology   Update since discharge: improved. Post Discharge Medication Reconciliation: discharge medications reconciled and changed, per note/orders.  Plan of care communicated with patient          She has upcoming appointment with cardiology and MRI   Myocarditis   Clean angio  CBC and BMP ok     lunesta gave her issues   ambien helped her   Took CBD gummies - DELTA 8 - apparently illegal in [place] when I google it - did not realize it was illegal - will stop and recheck with physical urine tox      Cardiology will see after MRI done     feels mood is good- not interested in treatment at this time     Review of Systems   Constitutional, HEENT,  cardiovascular, pulmonary, GI, , musculoskeletal, neuro, skin, endocrine and psych systems are negative, except as otherwise noted.      Objective           Vitals:  No vitals were obtained today due to virtual visit.    Physical Exam   GENERAL:  alert and no distress  EYES: Eyes grossly normal to inspection.  No discharge or erythema, or obvious scleral/conjunctival abnormalities.  RESP: No audible wheeze, cough, or visible cyanosis.  No visible retractions or increased work of breathing.    SKIN: Visible skin clear. No significant rash, abnormal pigmentation or lesions.  NEURO: Cranial nerves grossly intact.  Mentation and speech appropriate for age.  PSYCH: Mentation appears normal, affect normal/bright, judgement and insight intact, normal speech and appearance well-groomed.    Reviewed previous records             Video-Visit Details    Type of service:  Video Visit    Video End Time:10:57 AM    Originating Location (pt. Location): Home    Distant Location (provider location):  [facility]

## 2022-04-30 ENCOUNTER — HEALTH MAINTENANCE LETTER (OUTPATIENT)
Age: 42
End: 2022-04-30

## 2022-05-03 ENCOUNTER — HOSPITAL ENCOUNTER (OUTPATIENT)
Dept: CARDIOLOGY | Facility: CLINIC | Age: 42
Discharge: HOME | End: 2022-05-03
Attending: NURSE PRACTITIONER | Admitting: NURSE PRACTITIONER
Payer: COMMERCIAL

## 2022-05-03 DIAGNOSIS — R93.1 ABNORMAL ECHOCARDIOGRAM: ICD-10-CM

## 2022-05-03 DIAGNOSIS — R79.89 ELEVATED TROPONIN: ICD-10-CM

## 2022-05-03 DIAGNOSIS — I21.4 NSTEMI (NON-ST ELEVATED MYOCARDIAL INFARCTION) (H): ICD-10-CM

## 2022-05-03 PROCEDURE — A9585 GADOBUTROL INJECTION: HCPCS | Performed by: NURSE PRACTITIONER

## 2022-05-03 PROCEDURE — 250N000013 HC RX MED GY IP 250 OP 250 PS 637: Performed by: NURSE PRACTITIONER

## 2022-05-03 PROCEDURE — 75561 CARDIAC MRI FOR MORPH W/DYE: CPT | Mod: 26 | Performed by: INTERNAL MEDICINE

## 2022-05-03 PROCEDURE — 255N000002 HC RX 255 OP 636: Performed by: NURSE PRACTITIONER

## 2022-05-03 PROCEDURE — 75561 CARDIAC MRI FOR MORPH W/DYE: CPT

## 2022-05-03 RX ORDER — CAFFEINE CITRATE 20 MG/ML
60 SOLUTION INTRAVENOUS
Status: DISCONTINUED | OUTPATIENT
Start: 2022-05-03 | End: 2022-05-04 | Stop reason: HOSPADM

## 2022-05-03 RX ORDER — AMINOPHYLLINE 25 MG/ML
100 INJECTION, SOLUTION INTRAVENOUS ONCE
Status: DISCONTINUED | OUTPATIENT
Start: 2022-05-03 | End: 2022-05-04 | Stop reason: HOSPADM

## 2022-05-03 RX ORDER — DIPHENHYDRAMINE HCL 25 MG
25 CAPSULE ORAL
Status: DISCONTINUED | OUTPATIENT
Start: 2022-05-03 | End: 2022-05-04 | Stop reason: HOSPADM

## 2022-05-03 RX ORDER — GADOBUTROL 604.72 MG/ML
9 INJECTION INTRAVENOUS ONCE
Status: COMPLETED | OUTPATIENT
Start: 2022-05-03 | End: 2022-05-03

## 2022-05-03 RX ORDER — DIAZEPAM 5 MG
5 TABLET ORAL EVERY 30 MIN PRN
Status: DISCONTINUED | OUTPATIENT
Start: 2022-05-03 | End: 2022-05-04 | Stop reason: HOSPADM

## 2022-05-03 RX ORDER — ONDANSETRON 2 MG/ML
4 INJECTION INTRAMUSCULAR; INTRAVENOUS
Status: DISCONTINUED | OUTPATIENT
Start: 2022-05-03 | End: 2022-05-04 | Stop reason: HOSPADM

## 2022-05-03 RX ORDER — DIPHENHYDRAMINE HYDROCHLORIDE 50 MG/ML
25-50 INJECTION INTRAMUSCULAR; INTRAVENOUS
Status: DISCONTINUED | OUTPATIENT
Start: 2022-05-03 | End: 2022-05-04 | Stop reason: HOSPADM

## 2022-05-03 RX ORDER — REGADENOSON 0.08 MG/ML
0.4 INJECTION, SOLUTION INTRAVENOUS ONCE
Status: DISCONTINUED | OUTPATIENT
Start: 2022-05-03 | End: 2022-05-04 | Stop reason: HOSPADM

## 2022-05-03 RX ORDER — METHYLPREDNISOLONE SODIUM SUCCINATE 125 MG/2ML
125 INJECTION, POWDER, LYOPHILIZED, FOR SOLUTION INTRAMUSCULAR; INTRAVENOUS
Status: DISCONTINUED | OUTPATIENT
Start: 2022-05-03 | End: 2022-05-04 | Stop reason: HOSPADM

## 2022-05-03 RX ORDER — ALBUTEROL SULFATE 90 UG/1
2 AEROSOL, METERED RESPIRATORY (INHALATION) EVERY 5 MIN PRN
Status: DISCONTINUED | OUTPATIENT
Start: 2022-05-03 | End: 2022-05-04 | Stop reason: HOSPADM

## 2022-05-03 RX ORDER — ACYCLOVIR 200 MG/1
0-1 CAPSULE ORAL
Status: DISCONTINUED | OUTPATIENT
Start: 2022-05-03 | End: 2022-05-04 | Stop reason: HOSPADM

## 2022-05-03 RX ADMIN — DIAZEPAM 5 MG: 5 TABLET ORAL at 07:40

## 2022-05-03 RX ADMIN — GADOBUTROL 9 ML: 604.72 INJECTION INTRAVENOUS at 09:43

## 2022-09-02 DIAGNOSIS — J30.2 SEASONAL ALLERGIC RHINITIS, UNSPECIFIED TRIGGER: ICD-10-CM

## 2022-09-04 ENCOUNTER — HOSPITAL ENCOUNTER (EMERGENCY)
Facility: CLINIC | Age: 42
Discharge: HOME | End: 2022-09-04
Attending: EMERGENCY MEDICINE | Admitting: EMERGENCY MEDICINE
Payer: COMMERCIAL

## 2022-09-04 ENCOUNTER — APPOINTMENT (OUTPATIENT)
Dept: CT IMAGING | Facility: CLINIC | Age: 42
End: 2022-09-04
Attending: EMERGENCY MEDICINE
Payer: COMMERCIAL

## 2022-09-04 VITALS
DIASTOLIC BLOOD PRESSURE: 60 MMHG | HEART RATE: 65 BPM | RESPIRATION RATE: 20 BRPM | SYSTOLIC BLOOD PRESSURE: 111 MMHG | TEMPERATURE: 98.1 F | OXYGEN SATURATION: 100 %

## 2022-09-04 DIAGNOSIS — R11.2 NAUSEA VOMITING AND DIARRHEA: ICD-10-CM

## 2022-09-04 DIAGNOSIS — N94.9 ADNEXAL CYST: ICD-10-CM

## 2022-09-04 DIAGNOSIS — R19.7 NAUSEA VOMITING AND DIARRHEA: ICD-10-CM

## 2022-09-04 DIAGNOSIS — D25.9 UTERINE LEIOMYOMA, UNSPECIFIED LOCATION: ICD-10-CM

## 2022-09-04 DIAGNOSIS — K52.9 COLITIS: ICD-10-CM

## 2022-09-04 LAB
ALBUMIN SERPL BCG-MCNC: 4.5 G/DL (ref 3.5–5.2)
ALBUMIN SERPL BCG-MCNC: 4.8 G/DL (ref 3.5–5.2)
ALP SERPL-CCNC: 50 U/L (ref 35–104)
ALP SERPL-CCNC: 54 U/L (ref 35–104)
ALT SERPL W P-5'-P-CCNC: 33 U/L (ref 10–35)
ALT SERPL W P-5'-P-CCNC: 37 U/L (ref 10–35)
ANION GAP SERPL CALCULATED.3IONS-SCNC: 16 MMOL/L (ref 7–15)
ANION GAP SERPL CALCULATED.3IONS-SCNC: 21 MMOL/L (ref 7–15)
AST SERPL W P-5'-P-CCNC: 52 U/L (ref 10–35)
AST SERPL W P-5'-P-CCNC: 60 U/L (ref 10–35)
BASOPHILS # BLD AUTO: 0 10E3/UL (ref 0–0.2)
BASOPHILS NFR BLD AUTO: 0 %
BILIRUB SERPL-MCNC: 0.2 MG/DL
BILIRUB SERPL-MCNC: 0.2 MG/DL
BUN SERPL-MCNC: 5.8 MG/DL (ref 6–20)
BUN SERPL-MCNC: 6.9 MG/DL (ref 6–20)
CALCIUM SERPL-MCNC: 9 MG/DL (ref 8.6–10)
CALCIUM SERPL-MCNC: 9.6 MG/DL (ref 8.6–10)
CHLORIDE SERPL-SCNC: 100 MMOL/L (ref 98–107)
CHLORIDE SERPL-SCNC: 98 MMOL/L (ref 98–107)
CREAT BLD-MCNC: 0.4 MG/DL (ref 0.5–1)
CREAT SERPL-MCNC: 0.45 MG/DL (ref 0.51–0.95)
CREAT SERPL-MCNC: 0.52 MG/DL (ref 0.51–0.95)
DEPRECATED HCO3 PLAS-SCNC: 18 MMOL/L (ref 22–29)
DEPRECATED HCO3 PLAS-SCNC: 20 MMOL/L (ref 22–29)
EOSINOPHIL # BLD AUTO: 0 10E3/UL (ref 0–0.7)
EOSINOPHIL NFR BLD AUTO: 0 %
ERYTHROCYTE [DISTWIDTH] IN BLOOD BY AUTOMATED COUNT: 11.6 % (ref 10–15)
ERYTHROCYTE [DISTWIDTH] IN BLOOD BY AUTOMATED COUNT: 11.7 % (ref 10–15)
GFR SERPL CREATININE-BSD FRML MDRD: >60 ML/MIN/1.73M2
GFR SERPL CREATININE-BSD FRML MDRD: >90 ML/MIN/1.73M2
GFR SERPL CREATININE-BSD FRML MDRD: >90 ML/MIN/1.73M2
GLUCOSE SERPL-MCNC: 126 MG/DL (ref 70–99)
GLUCOSE SERPL-MCNC: 139 MG/DL (ref 70–99)
HCG SERPL QL: NEGATIVE
HCT VFR BLD AUTO: 35.1 % (ref 35–47)
HCT VFR BLD AUTO: 36.6 % (ref 35–47)
HGB BLD-MCNC: 12 G/DL (ref 11.7–15.7)
HGB BLD-MCNC: 12.7 G/DL (ref 11.7–15.7)
IMM GRANULOCYTES # BLD: 0 10E3/UL
IMM GRANULOCYTES NFR BLD: 0 %
LIPASE SERPL-CCNC: 14 U/L (ref 13–60)
LIPASE SERPL-CCNC: 16 U/L (ref 13–60)
LYMPHOCYTES # BLD AUTO: 0.5 10E3/UL (ref 0.8–5.3)
LYMPHOCYTES NFR BLD AUTO: 6 %
MCH RBC QN AUTO: 35 PG (ref 26.5–33)
MCH RBC QN AUTO: 35.5 PG (ref 26.5–33)
MCHC RBC AUTO-ENTMCNC: 34.2 G/DL (ref 31.5–36.5)
MCHC RBC AUTO-ENTMCNC: 34.7 G/DL (ref 31.5–36.5)
MCV RBC AUTO: 102 FL (ref 78–100)
MCV RBC AUTO: 102 FL (ref 78–100)
MONOCYTES # BLD AUTO: 0.2 10E3/UL (ref 0–1.3)
MONOCYTES NFR BLD AUTO: 3 %
NEUTROPHILS # BLD AUTO: 7.3 10E3/UL (ref 1.6–8.3)
NEUTROPHILS NFR BLD AUTO: 91 %
NRBC # BLD AUTO: 0 10E3/UL
NRBC BLD AUTO-RTO: 0 /100
PLATELET # BLD AUTO: 272 10E3/UL (ref 150–450)
PLATELET # BLD AUTO: 304 10E3/UL (ref 150–450)
POTASSIUM SERPL-SCNC: 3.3 MMOL/L (ref 3.4–5.3)
POTASSIUM SERPL-SCNC: 3.6 MMOL/L (ref 3.4–5.3)
PROT SERPL-MCNC: 7.4 G/DL (ref 6.4–8.3)
PROT SERPL-MCNC: 8 G/DL (ref 6.4–8.3)
RBC # BLD AUTO: 3.43 10E6/UL (ref 3.8–5.2)
RBC # BLD AUTO: 3.58 10E6/UL (ref 3.8–5.2)
SODIUM SERPL-SCNC: 136 MMOL/L (ref 136–145)
SODIUM SERPL-SCNC: 137 MMOL/L (ref 136–145)
WBC # BLD AUTO: 8.1 10E3/UL (ref 4–11)
WBC # BLD AUTO: 8.1 10E3/UL (ref 4–11)

## 2022-09-04 PROCEDURE — 99285 EMERGENCY DEPT VISIT HI MDM: CPT | Mod: 25

## 2022-09-04 PROCEDURE — 82565 ASSAY OF CREATININE: CPT

## 2022-09-04 PROCEDURE — 999N000127 HC STATISTIC PERIPHERAL IV START W US GUIDANCE

## 2022-09-04 PROCEDURE — 96375 TX/PRO/DX INJ NEW DRUG ADDON: CPT

## 2022-09-04 PROCEDURE — 93005 ELECTROCARDIOGRAM TRACING: CPT

## 2022-09-04 PROCEDURE — 84703 CHORIONIC GONADOTROPIN ASSAY: CPT | Performed by: EMERGENCY MEDICINE

## 2022-09-04 PROCEDURE — 36415 COLL VENOUS BLD VENIPUNCTURE: CPT | Performed by: EMERGENCY MEDICINE

## 2022-09-04 PROCEDURE — 85027 COMPLETE CBC AUTOMATED: CPT | Performed by: EMERGENCY MEDICINE

## 2022-09-04 PROCEDURE — 258N000003 HC RX IP 258 OP 636: Performed by: EMERGENCY MEDICINE

## 2022-09-04 PROCEDURE — 96361 HYDRATE IV INFUSION ADD-ON: CPT

## 2022-09-04 PROCEDURE — 250N000011 HC RX IP 250 OP 636: Performed by: EMERGENCY MEDICINE

## 2022-09-04 PROCEDURE — 74177 CT ABD & PELVIS W/CONTRAST: CPT

## 2022-09-04 PROCEDURE — 84155 ASSAY OF PROTEIN SERUM: CPT | Performed by: EMERGENCY MEDICINE

## 2022-09-04 PROCEDURE — 96366 THER/PROPH/DIAG IV INF ADDON: CPT

## 2022-09-04 PROCEDURE — 250N000013 HC RX MED GY IP 250 OP 250 PS 637: Performed by: EMERGENCY MEDICINE

## 2022-09-04 PROCEDURE — 250N000009 HC RX 250: Performed by: EMERGENCY MEDICINE

## 2022-09-04 PROCEDURE — 96376 TX/PRO/DX INJ SAME DRUG ADON: CPT

## 2022-09-04 PROCEDURE — 96365 THER/PROPH/DIAG IV INF INIT: CPT | Mod: 59

## 2022-09-04 PROCEDURE — 83690 ASSAY OF LIPASE: CPT | Performed by: EMERGENCY MEDICINE

## 2022-09-04 RX ORDER — IOPAMIDOL 755 MG/ML
500 INJECTION, SOLUTION INTRAVASCULAR ONCE
Status: COMPLETED | OUTPATIENT
Start: 2022-09-04 | End: 2022-09-04

## 2022-09-04 RX ORDER — MAGNESIUM SULFATE HEPTAHYDRATE 40 MG/ML
2 INJECTION, SOLUTION INTRAVENOUS ONCE
Status: COMPLETED | OUTPATIENT
Start: 2022-09-04 | End: 2022-09-04

## 2022-09-04 RX ORDER — METOCLOPRAMIDE HYDROCHLORIDE 5 MG/ML
10 INJECTION INTRAMUSCULAR; INTRAVENOUS ONCE
Status: COMPLETED | OUTPATIENT
Start: 2022-09-04 | End: 2022-09-04

## 2022-09-04 RX ORDER — HYDROMORPHONE HYDROCHLORIDE 1 MG/ML
0.5 INJECTION, SOLUTION INTRAMUSCULAR; INTRAVENOUS; SUBCUTANEOUS ONCE
Status: COMPLETED | OUTPATIENT
Start: 2022-09-04 | End: 2022-09-04

## 2022-09-04 RX ORDER — DIPHENHYDRAMINE HYDROCHLORIDE 50 MG/ML
25 INJECTION INTRAMUSCULAR; INTRAVENOUS ONCE
Status: COMPLETED | OUTPATIENT
Start: 2022-09-04 | End: 2022-09-04

## 2022-09-04 RX ORDER — ONDANSETRON 4 MG/1
4 TABLET, ORALLY DISINTEGRATING ORAL ONCE
Status: COMPLETED | OUTPATIENT
Start: 2022-09-04 | End: 2022-09-04

## 2022-09-04 RX ORDER — ONDANSETRON 2 MG/ML
4 INJECTION INTRAMUSCULAR; INTRAVENOUS
Status: DISCONTINUED | OUTPATIENT
Start: 2022-09-04 | End: 2022-09-04 | Stop reason: HOSPADM

## 2022-09-04 RX ORDER — METOCLOPRAMIDE 10 MG/1
10 TABLET ORAL
Qty: 12 TABLET | Refills: 0 | Status: SHIPPED | OUTPATIENT
Start: 2022-09-04 | End: 2022-09-07

## 2022-09-04 RX ORDER — ACETAMINOPHEN 500 MG
1000 TABLET ORAL ONCE
Status: COMPLETED | OUTPATIENT
Start: 2022-09-04 | End: 2022-09-04

## 2022-09-04 RX ADMIN — MAGNESIUM SULFATE HEPTAHYDRATE 2 G: 40 INJECTION, SOLUTION INTRAVENOUS at 10:01

## 2022-09-04 RX ADMIN — IOPAMIDOL 70 ML: 755 INJECTION, SOLUTION INTRAVENOUS at 12:27

## 2022-09-04 RX ADMIN — METOCLOPRAMIDE HYDROCHLORIDE 10 MG: 5 INJECTION INTRAMUSCULAR; INTRAVENOUS at 10:25

## 2022-09-04 RX ADMIN — LIDOCAINE HYDROCHLORIDE 30 ML: 20 SOLUTION ORAL; TOPICAL at 10:01

## 2022-09-04 RX ADMIN — ONDANSETRON 4 MG: 4 TABLET, ORALLY DISINTEGRATING ORAL at 06:41

## 2022-09-04 RX ADMIN — SODIUM CHLORIDE 70 ML: 9 INJECTION, SOLUTION INTRAVENOUS at 12:30

## 2022-09-04 RX ADMIN — ONDANSETRON HYDROCHLORIDE 4 MG: 2 INJECTION, SOLUTION INTRAMUSCULAR; INTRAVENOUS at 08:52

## 2022-09-04 RX ADMIN — DIPHENHYDRAMINE HYDROCHLORIDE 25 MG: 50 INJECTION, SOLUTION INTRAMUSCULAR; INTRAVENOUS at 11:25

## 2022-09-04 RX ADMIN — ONDANSETRON HYDROCHLORIDE 4 MG: 2 INJECTION, SOLUTION INTRAMUSCULAR; INTRAVENOUS at 10:10

## 2022-09-04 RX ADMIN — HYDROMORPHONE HYDROCHLORIDE 0.5 MG: 1 INJECTION, SOLUTION INTRAMUSCULAR; INTRAVENOUS; SUBCUTANEOUS at 08:52

## 2022-09-04 RX ADMIN — ACETAMINOPHEN 1000 MG: 500 TABLET, FILM COATED ORAL at 06:41

## 2022-09-04 RX ADMIN — SODIUM CHLORIDE 1000 ML: 9 INJECTION, SOLUTION INTRAVENOUS at 08:52

## 2022-09-04 RX ADMIN — LIDOCAINE HYDROCHLORIDE 30 ML: 20 SOLUTION ORAL; TOPICAL at 11:55

## 2022-09-04 ASSESSMENT — ENCOUNTER SYMPTOMS
NAUSEA: 1
VOMITING: 1
SHORTNESS OF BREATH: 0
BLOOD IN STOOL: 0
ABDOMINAL PAIN: 1
DYSURIA: 0
DIARRHEA: 1
FREQUENCY: 0

## 2022-09-04 ASSESSMENT — ACTIVITIES OF DAILY LIVING (ADL)
ADLS_ACUITY_SCORE: 35

## 2022-09-04 NOTE — ED TRIAGE NOTES
Pt states having upper abdominal pain with n/v/d tonight. ABCs intact GCS 15     Triage Assessment     Row Name 09/04/22 0623       Triage Assessment (Adult)    Airway WDL WDL       Respiratory WDL    Respiratory WDL WDL       Skin Circulation/Temperature WDL    Skin Circulation/Temperature WDL WDL       Cardiac WDL    Cardiac WDL WDL       Peripheral/Neurovascular WDL    Peripheral Neurovascular WDL WDL       Cognitive/Neuro/Behavioral WDL    Cognitive/Neuro/Behavioral WDL WDL

## 2022-09-04 NOTE — ED PROVIDER NOTES
History   Chief Complaint:  Abdominal Pain       HPI   Humera Lovell is a 41 year old female with history of cholecystectomy who presents with upper abdominal pain since last night an hour after eating dinner, around 10 pm. She complains of nausea, vomiting, diarrhea, shaking and chills since. Notes abdominal pain is sharp and primarily in the upper left quadrant. Denies bloody stools, dysuria, urgency, frequency, chest pain, shortness of breath. Currently on menstrual period, denying risk of pregnancy.     She reports a similar episode in [place] after drinking water with excessive nausea and vomiting.     Coronary Angiogram Results 3/29/2022:  Normal coronary arteries      Review of Systems   Respiratory: Negative for shortness of breath.    Cardiovascular: Negative for chest pain.   Gastrointestinal: Positive for abdominal pain, diarrhea, nausea and vomiting. Negative for blood in stool.   Genitourinary: Negative for dysuria, frequency, menstrual problem and urgency.   All other systems reviewed and are negative.      Allergies:  The patient has no known allergies.     Medications:  Ambien  Coreg  Atarax  Flonase    Past Medical History:     NSTEMI  Gastritis  Depression     Past Surgical History:    Coronary angiogram  Gallbladder surgery  Orthopedic surgery     Family History:    Mother - diabetes, hypertension  Father - diabetes, hypertension  Brother - autism    Social History:  The patient presents to the ED with significant other.   PCP: Beatriz Cifuentes     Physical Exam     Patient Vitals for the past 24 hrs:   BP Temp Temp src Pulse Resp SpO2   09/04/22 1200 111/60 -- -- 65 -- 100 %   09/04/22 1145 111/62 -- -- 64 -- 100 %   09/04/22 1130 122/52 -- -- 67 -- 100 %   09/04/22 1115 107/61 -- -- 69 -- 100 %   09/04/22 1100 112/53 -- -- 66 -- 100 %   09/04/22 1045 132/60 -- -- 78 -- 100 %   09/04/22 1030 112/67 -- -- 60 -- 100 %   09/04/22 1015 119/80 -- -- 64 -- 100 %   09/04/22 1000 124/84 -- -- -- -- --    09/04/22 0623 (!) 141/96 98.1  F (36.7  C) Oral 72 20 99 %       Physical Exam  General: Alert, appears uncomfortable 2/2 epigastric pain, actively retching   HEENT:  Moist mucous membranes.  Conjunctiva normal.   CV:  RRR, no m/r/g, skin warm and well perfused  Pulm:  CTAB, no wheezes/ronchi/rales.  No acute distress, breathing comfortably  GI:  Soft, epigastric tenderness, nondistended.  No rebound or guarding.   MSK:  Moving all extremities.  No focal areas of edema, erythema; no CVA tenderness  Skin:  WWP, no rashes, skin color normal, no diaphoresis  Psych:  Well-appearing, normal affect, regular speech    Emergency Department Course   ECG  ECG results from 09/04/22, 902, read at 902   EKG 12 lead     Value    Systolic Blood Pressure     Diastolic Blood Pressure     Ventricular Rate 48    Atrial Rate 48    CO Interval 128    QRS Duration 92        QTc 457    P Axis -26    R AXIS 58    T Axis 35    Interpretation ECG      Sinus bradycardia  Otherwise normal ECG  No previous ECGs available         Imaging:  Abd/pelvis CT,  IV  contrast only TRAUMA / AAA   Final Result   IMPRESSION:    1.  Possible mild transverse colitis.   2.  No evidence of bowel obstruction.   3.  Incidental simple appearing right adnexal cyst and posterior fundal uterine fibroid, no specific follow-up recommended.                 Report per radiology    Laboratory:  Labs Ordered and Resulted from Time of ED Arrival to Time of ED Departure   COMPREHENSIVE METABOLIC PANEL - Abnormal       Result Value    Sodium 137      Potassium 3.3 (*)     Creatinine 0.52      Urea Nitrogen 6.9      Chloride 98      Carbon Dioxide (CO2) 18 (*)     Anion Gap 21 (*)     Glucose 126 (*)     Calcium 9.6      Protein Total 8.0      Albumin 4.8      Bilirubin Total 0.2      Alkaline Phosphatase 54      AST 60 (*)     ALT 37 (*)     GFR Estimate >90     CBC WITH PLATELETS AND DIFFERENTIAL - Abnormal    WBC Count 8.1      RBC Count 3.58 (*)     Hemoglobin  12.7      Hematocrit 36.6       (*)     MCH 35.5 (*)     MCHC 34.7      RDW 11.6      Platelet Count 304      % Neutrophils 91      % Lymphocytes 6      % Monocytes 3      % Eosinophils 0      % Basophils 0      % Immature Granulocytes 0      NRBCs per 100 WBC 0      Absolute Neutrophils 7.3      Absolute Lymphocytes 0.5 (*)     Absolute Monocytes 0.2      Absolute Eosinophils 0.0      Absolute Basophils 0.0      Absolute Immature Granulocytes 0.0      Absolute NRBCs 0.0     CBC WITH PLATELETS - Abnormal    WBC Count 8.1      RBC Count 3.43 (*)     Hemoglobin 12.0      Hematocrit 35.1       (*)     MCH 35.0 (*)     MCHC 34.2      RDW 11.7      Platelet Count 272     COMPREHENSIVE METABOLIC PANEL - Abnormal    Sodium 136      Potassium 3.6      Creatinine 0.45 (*)     Urea Nitrogen 5.8 (*)     Chloride 100      Carbon Dioxide (CO2) 20 (*)     Anion Gap 16 (*)     Glucose 139 (*)     Calcium 9.0      Protein Total 7.4      Albumin 4.5      Bilirubin Total 0.2      Alkaline Phosphatase 50      AST 52 (*)     ALT 33      GFR Estimate >90     ISTAT CREATININE POCT - Abnormal    Creatinine POCT 0.4 (*)     GFR, ESTIMATED POCT >60     LIPASE - Normal    Lipase 16     LIPASE - Normal    Lipase 14     HCG QUALITATIVE PREGNANCY - Normal    hCG Serum Qualitative Negative        Emergency Department Course:       Reviewed:  I reviewed nursing notes, vitals, past medical history and Care Everywhere    Assessments:  813 I obtained history and examined the patient as noted above.   1100 I rechecked the patient and explained findings. She is feeling much better.   0115 discussed CT findings with the patient.  She is able to tolerate crackers and water.  Abdominal exam is benign.    Interventions:  Medications   ondansetron (ZOFRAN) injection 4 mg (4 mg Intravenous Given 9/4/22 1010)   ondansetron (ZOFRAN ODT) ODT tab 4 mg (4 mg Oral Given 9/4/22 0641)   acetaminophen (TYLENOL) tablet 1,000 mg (1,000 mg Oral Given  22 0641)   HYDROmorphone (PF) (DILAUDID) injection 0.5 mg (0.5 mg Intravenous Given 22 0852)   lidocaine (viscous) (XYLOCAINE) 2 % 15 mL, alum & mag hydroxide-simethicone (MAALOX) 15 mL GI Cocktail (30 mLs Oral Given 22 1001)   0.9% sodium chloride BOLUS (0 mLs Intravenous Stopped 22 1147)   magnesium sulfate 2 g in water intermittent infusion (0 g Intravenous Stopped 22 114)   metoclopramide (REGLAN) injection 10 mg (10 mg Intravenous Given 22 1025)   diphenhydrAMINE (BENADRYL) injection 25 mg (25 mg Intravenous Given 22 1125)   lidocaine (viscous) (XYLOCAINE) 2 % 15 mL, alum & mag hydroxide-simethicone (MAALOX) 15 mL GI Cocktail (30 mLs Oral Given 22 1155)   sodium chloride for CT scan flush use (70 mLs Intravenous Given 22 1230)   iopamidol (ISOVUE-370) solution 500 mL (70 mLs Intravenous Given 22 1227)      Disposition:  The patient was discharged to home.     Impression & Plan     Medical Decision Makin-year-old female with history of cholecystectomy presenting to the ER for evaluation of abdominal pain with nausea/vomiting and diarrhea.  Please see above for details HPI and exam.  Patient appears uncomfortable on initial evaluation.  She is afebrile vitally stable.  She has epigastric tenderness on exam and given surgical history, concern for possible SBO.  CT abdomen shows concern for transverse colitis.  Incidental findings of right adnexal cyst and uterine fibroid also discussed with the patient.  Her basic lab studies above show slight anion gap I suspect from vomiting and dehydration which is improved with fluids.  There is also slight nonspecific elevation of AST and ALT, normal lipase, normal kidney function electrolytes and CBC.  She felt significant improvement with the above inventions and was able to tolerate crackers and water in the ER.  Suspect infectious cause for colitis.  Doubt ischemic cause.  Very low suspicion for bacterial etiology for her  diarrhea given no fevers or bloody stools or recent antibiotic use.  Suspect viral etiology and recommend expectant management and supportive cares which are discussed at bedside.  Recommend Pedialyte, Reglan as needed for nausea/vomiting, increase fluid intake.  Follow-up with PCP discussed and patient understands and agrees.  Reasons to return to the ER discussed and all questions answered prior to discharge.      Diagnosis:    ICD-10-CM    1. Colitis  K52.9    2. Nausea vomiting and diarrhea  R11.2     R19.7    3. Adnexal cyst  N94.9    4. Uterine leiomyoma, unspecified location  D25.9        Discharge Medications:  New Prescriptions    METOCLOPRAMIDE (REGLAN) 10 MG TABLET    Take 1 tablet (10 mg) by mouth 4 times daily (before meals and nightly) for 3 days       Scribe Disclosure:  Elen DICKERSON, am serving as a scribe at 8:09 AM on 9/4/2022 to document services personally performed by Cam Nguyen MD based on my observations and the provider's statements to me.          Cam Nguyen MD  09/04/22 2607

## 2022-09-06 LAB
ATRIAL RATE - MUSE: 48 BPM
DIASTOLIC BLOOD PRESSURE - MUSE: NORMAL MMHG
INTERPRETATION ECG - MUSE: NORMAL
P AXIS - MUSE: -26 DEGREES
PR INTERVAL - MUSE: 128 MS
QRS DURATION - MUSE: 92 MS
QT - MUSE: 512 MS
QTC - MUSE: 457 MS
R AXIS - MUSE: 58 DEGREES
SYSTOLIC BLOOD PRESSURE - MUSE: NORMAL MMHG
T AXIS - MUSE: 35 DEGREES
VENTRICULAR RATE- MUSE: 48 BPM

## 2022-09-06 RX ORDER — FLUTICASONE PROPIONATE 50 MCG
SPRAY, SUSPENSION (ML) NASAL
Qty: 16 G | Refills: 6 | Status: SHIPPED | OUTPATIENT
Start: 2022-09-06 | End: 2023-09-13

## 2022-09-06 NOTE — TELEPHONE ENCOUNTER
Fluticasone (Flonase) 50 mcg act nasal spray  Prescription approved per [facility] Refill Protocol.

## 2022-11-01 ENCOUNTER — TELEPHONE (OUTPATIENT)
Dept: INTERNAL MEDICINE | Facility: CLINIC | Age: 42
End: 2022-11-01

## 2022-11-01 DIAGNOSIS — L30.9 ECZEMA, UNSPECIFIED TYPE: Primary | ICD-10-CM

## 2022-11-01 NOTE — TELEPHONE ENCOUNTER
Reason for Call: Request for an order or referral:    Order or referral being requested: dermatology    Date needed: at your convenience    Has the patient been seen by the PCP for this problem? YES    Additional comments: eczema     Please call or notify when referral is put in so she can schedule an appt    Phone number Patient can be reached at:  Home number on file [phone] (home)    Best Time:  anytime    Can we leave a detailed message on this number?  YES    Call taken on 11/1/2022 at 9:21 AM by Mirian Orozco

## 2022-11-12 ENCOUNTER — HOSPITAL ENCOUNTER (EMERGENCY)
Facility: CLINIC | Age: 42
Discharge: LEFT BEFORE DISCHARGE | End: 2022-11-12
Admitting: EMERGENCY MEDICINE
Payer: COMMERCIAL

## 2022-11-12 VITALS
RESPIRATION RATE: 28 BRPM | HEART RATE: 75 BPM | DIASTOLIC BLOOD PRESSURE: 86 MMHG | TEMPERATURE: 97.8 F | SYSTOLIC BLOOD PRESSURE: 130 MMHG | OXYGEN SATURATION: 100 %

## 2022-11-12 LAB
BASOPHILS # BLD AUTO: 0 10E3/UL (ref 0–0.2)
BASOPHILS NFR BLD AUTO: 0 %
EOSINOPHIL # BLD AUTO: 0 10E3/UL (ref 0–0.7)
EOSINOPHIL NFR BLD AUTO: 0 %
ERYTHROCYTE [DISTWIDTH] IN BLOOD BY AUTOMATED COUNT: 11.7 % (ref 10–15)
HCT VFR BLD AUTO: 38.9 % (ref 35–47)
HGB BLD-MCNC: 13.4 G/DL (ref 11.7–15.7)
IMM GRANULOCYTES # BLD: 0.1 10E3/UL
IMM GRANULOCYTES NFR BLD: 0 %
LYMPHOCYTES # BLD AUTO: 0.7 10E3/UL (ref 0.8–5.3)
LYMPHOCYTES NFR BLD AUTO: 6 %
MCH RBC QN AUTO: 35.2 PG (ref 26.5–33)
MCHC RBC AUTO-ENTMCNC: 34.4 G/DL (ref 31.5–36.5)
MCV RBC AUTO: 102 FL (ref 78–100)
MONOCYTES # BLD AUTO: 0.3 10E3/UL (ref 0–1.3)
MONOCYTES NFR BLD AUTO: 2 %
NEUTROPHILS # BLD AUTO: 11.7 10E3/UL (ref 1.6–8.3)
NEUTROPHILS NFR BLD AUTO: 92 %
NRBC # BLD AUTO: 0 10E3/UL
NRBC BLD AUTO-RTO: 0 /100
PLATELET # BLD AUTO: 340 10E3/UL (ref 150–450)
RBC # BLD AUTO: 3.81 10E6/UL (ref 3.8–5.2)
WBC # BLD AUTO: 12.8 10E3/UL (ref 4–11)

## 2022-11-12 PROCEDURE — 999N000104 HC STATISTIC NO CHARGE

## 2022-11-12 PROCEDURE — 36415 COLL VENOUS BLD VENIPUNCTURE: CPT | Performed by: EMERGENCY MEDICINE

## 2022-11-12 PROCEDURE — 85025 COMPLETE CBC W/AUTO DIFF WBC: CPT | Performed by: EMERGENCY MEDICINE

## 2022-11-12 RX ORDER — ONDANSETRON 2 MG/ML
4 INJECTION INTRAMUSCULAR; INTRAVENOUS ONCE
Status: DISCONTINUED | OUTPATIENT
Start: 2022-11-12 | End: 2022-11-12 | Stop reason: HOSPADM

## 2022-11-12 NOTE — ED TRIAGE NOTES
Pt vomiting and having severe abdominal pain in triage; increased respiratory effort due to pain; dry heaving in triage; no diarrhea.

## 2022-11-15 ENCOUNTER — HOSPITAL ENCOUNTER (EMERGENCY)
Facility: CLINIC | Age: 42
Discharge: HOME | End: 2022-11-15
Attending: EMERGENCY MEDICINE | Admitting: EMERGENCY MEDICINE
Payer: COMMERCIAL

## 2022-11-15 VITALS
OXYGEN SATURATION: 100 % | BODY MASS INDEX: 21.97 KG/M2 | HEART RATE: 71 BPM | RESPIRATION RATE: 16 BRPM | WEIGHT: 140 LBS | DIASTOLIC BLOOD PRESSURE: 88 MMHG | SYSTOLIC BLOOD PRESSURE: 105 MMHG | HEIGHT: 67 IN | TEMPERATURE: 97.6 F

## 2022-11-15 DIAGNOSIS — E83.42 HYPOMAGNESEMIA: ICD-10-CM

## 2022-11-15 DIAGNOSIS — R11.2 NAUSEA AND VOMITING, UNSPECIFIED VOMITING TYPE: ICD-10-CM

## 2022-11-15 DIAGNOSIS — E87.6 HYPOKALEMIA: ICD-10-CM

## 2022-11-15 LAB
ALBUMIN SERPL BCG-MCNC: 4.3 G/DL (ref 3.5–5.2)
ALBUMIN UR-MCNC: NEGATIVE MG/DL
ALP SERPL-CCNC: 58 U/L (ref 35–104)
ALT SERPL W P-5'-P-CCNC: 24 U/L (ref 10–35)
ANION GAP SERPL CALCULATED.3IONS-SCNC: 16 MMOL/L (ref 7–15)
APPEARANCE UR: CLEAR
AST SERPL W P-5'-P-CCNC: 26 U/L (ref 10–35)
BASOPHILS # BLD AUTO: 0 10E3/UL (ref 0–0.2)
BASOPHILS NFR BLD AUTO: 1 %
BILIRUB SERPL-MCNC: 0.8 MG/DL
BILIRUB UR QL STRIP: NEGATIVE
BUN SERPL-MCNC: 7.8 MG/DL (ref 6–20)
CALCIUM SERPL-MCNC: 9.6 MG/DL (ref 8.6–10)
CHLORIDE SERPL-SCNC: 89 MMOL/L (ref 98–107)
COLOR UR AUTO: ABNORMAL
CREAT SERPL-MCNC: 0.63 MG/DL (ref 0.51–0.95)
DEPRECATED HCO3 PLAS-SCNC: 26 MMOL/L (ref 22–29)
EOSINOPHIL # BLD AUTO: 0 10E3/UL (ref 0–0.7)
EOSINOPHIL NFR BLD AUTO: 1 %
ERYTHROCYTE [DISTWIDTH] IN BLOOD BY AUTOMATED COUNT: 10 % (ref 10–15)
FLUAV RNA SPEC QL NAA+PROBE: NEGATIVE
FLUBV RNA RESP QL NAA+PROBE: NEGATIVE
GFR SERPL CREATININE-BSD FRML MDRD: >90 ML/MIN/1.73M2
GLUCOSE SERPL-MCNC: 110 MG/DL (ref 70–99)
GLUCOSE UR STRIP-MCNC: NEGATIVE MG/DL
HCG UR QL: NEGATIVE
HCT VFR BLD AUTO: 34.5 % (ref 35–47)
HGB BLD-MCNC: 12.8 G/DL (ref 11.7–15.7)
HGB UR QL STRIP: NEGATIVE
IMM GRANULOCYTES # BLD: 0 10E3/UL
IMM GRANULOCYTES NFR BLD: 0 %
KETONES UR STRIP-MCNC: 20 MG/DL
LEUKOCYTE ESTERASE UR QL STRIP: NEGATIVE
LIPASE SERPL-CCNC: 24 U/L (ref 13–60)
LYMPHOCYTES # BLD AUTO: 1.6 10E3/UL (ref 0.8–5.3)
LYMPHOCYTES NFR BLD AUTO: 27 %
MAGNESIUM SERPL-MCNC: 1.5 MG/DL (ref 1.7–2.3)
MCH RBC QN AUTO: 35.7 PG (ref 26.5–33)
MCHC RBC AUTO-ENTMCNC: 37.1 G/DL (ref 31.5–36.5)
MCV RBC AUTO: 96 FL (ref 78–100)
MONOCYTES # BLD AUTO: 0.8 10E3/UL (ref 0–1.3)
MONOCYTES NFR BLD AUTO: 14 %
NEUTROPHILS # BLD AUTO: 3.4 10E3/UL (ref 1.6–8.3)
NEUTROPHILS NFR BLD AUTO: 57 %
NITRATE UR QL: NEGATIVE
NRBC # BLD AUTO: 0 10E3/UL
NRBC BLD AUTO-RTO: 0 /100
PH UR STRIP: 6.5 [PH] (ref 5–7)
PLATELET # BLD AUTO: 300 10E3/UL (ref 150–450)
POTASSIUM SERPL-SCNC: 2.6 MMOL/L (ref 3.4–5.3)
PROT SERPL-MCNC: 7.2 G/DL (ref 6.4–8.3)
RBC # BLD AUTO: 3.59 10E6/UL (ref 3.8–5.2)
RBC URINE: <1 /HPF
RSV RNA SPEC NAA+PROBE: NEGATIVE
SARS-COV-2 RNA RESP QL NAA+PROBE: NEGATIVE
SODIUM SERPL-SCNC: 131 MMOL/L (ref 136–145)
SP GR UR STRIP: 1 (ref 1–1.03)
UROBILINOGEN UR STRIP-MCNC: NORMAL MG/DL
WBC # BLD AUTO: 5.9 10E3/UL (ref 4–11)
WBC URINE: <1 /HPF

## 2022-11-15 PROCEDURE — 80053 COMPREHEN METABOLIC PANEL: CPT | Performed by: EMERGENCY MEDICINE

## 2022-11-15 PROCEDURE — 250N000013 HC RX MED GY IP 250 OP 250 PS 637: Performed by: EMERGENCY MEDICINE

## 2022-11-15 PROCEDURE — 258N000003 HC RX IP 258 OP 636: Performed by: EMERGENCY MEDICINE

## 2022-11-15 PROCEDURE — 87637 SARSCOV2&INF A&B&RSV AMP PRB: CPT | Performed by: EMERGENCY MEDICINE

## 2022-11-15 PROCEDURE — 93005 ELECTROCARDIOGRAM TRACING: CPT

## 2022-11-15 PROCEDURE — 96365 THER/PROPH/DIAG IV INF INIT: CPT

## 2022-11-15 PROCEDURE — 99284 EMERGENCY DEPT VISIT MOD MDM: CPT | Mod: 25

## 2022-11-15 PROCEDURE — C9803 HOPD COVID-19 SPEC COLLECT: HCPCS

## 2022-11-15 PROCEDURE — 96367 TX/PROPH/DG ADDL SEQ IV INF: CPT

## 2022-11-15 PROCEDURE — 36415 COLL VENOUS BLD VENIPUNCTURE: CPT | Performed by: EMERGENCY MEDICINE

## 2022-11-15 PROCEDURE — 81025 URINE PREGNANCY TEST: CPT | Performed by: EMERGENCY MEDICINE

## 2022-11-15 PROCEDURE — 96375 TX/PRO/DX INJ NEW DRUG ADDON: CPT

## 2022-11-15 PROCEDURE — 81001 URINALYSIS AUTO W/SCOPE: CPT | Performed by: EMERGENCY MEDICINE

## 2022-11-15 PROCEDURE — 250N000011 HC RX IP 250 OP 636: Performed by: EMERGENCY MEDICINE

## 2022-11-15 PROCEDURE — 83690 ASSAY OF LIPASE: CPT | Performed by: EMERGENCY MEDICINE

## 2022-11-15 PROCEDURE — 96361 HYDRATE IV INFUSION ADD-ON: CPT

## 2022-11-15 PROCEDURE — 85004 AUTOMATED DIFF WBC COUNT: CPT | Performed by: EMERGENCY MEDICINE

## 2022-11-15 PROCEDURE — 83735 ASSAY OF MAGNESIUM: CPT | Performed by: EMERGENCY MEDICINE

## 2022-11-15 RX ORDER — POTASSIUM CHLORIDE 1500 MG/1
40 TABLET, EXTENDED RELEASE ORAL ONCE
Status: COMPLETED | OUTPATIENT
Start: 2022-11-15 | End: 2022-11-15

## 2022-11-15 RX ORDER — ONDANSETRON 2 MG/ML
4 INJECTION INTRAMUSCULAR; INTRAVENOUS ONCE
Status: DISCONTINUED | OUTPATIENT
Start: 2022-11-15 | End: 2022-11-15

## 2022-11-15 RX ORDER — MAGNESIUM SULFATE HEPTAHYDRATE 40 MG/ML
2 INJECTION, SOLUTION INTRAVENOUS ONCE
Status: COMPLETED | OUTPATIENT
Start: 2022-11-15 | End: 2022-11-15

## 2022-11-15 RX ORDER — LORAZEPAM 1 MG/1
1 TABLET ORAL ONCE
Status: COMPLETED | OUTPATIENT
Start: 2022-11-15 | End: 2022-11-15

## 2022-11-15 RX ORDER — MAGNESIUM OXIDE 400 MG/1
400 TABLET ORAL DAILY
Qty: 5 TABLET | Refills: 0 | Status: SHIPPED | OUTPATIENT
Start: 2022-11-15 | End: 2022-11-20

## 2022-11-15 RX ORDER — PROCHLORPERAZINE MALEATE 10 MG
10 TABLET ORAL EVERY 6 HOURS PRN
Qty: 20 TABLET | Refills: 0 | Status: SHIPPED | OUTPATIENT
Start: 2022-11-15 | End: 2023-06-17

## 2022-11-15 RX ORDER — POTASSIUM CHLORIDE 1500 MG/1
40 TABLET, EXTENDED RELEASE ORAL DAILY
Qty: 8 TABLET | Refills: 0 | Status: SHIPPED | OUTPATIENT
Start: 2022-11-16 | End: 2022-11-20

## 2022-11-15 RX ORDER — DIPHENHYDRAMINE HYDROCHLORIDE 50 MG/ML
25 INJECTION INTRAMUSCULAR; INTRAVENOUS ONCE
Status: COMPLETED | OUTPATIENT
Start: 2022-11-15 | End: 2022-11-15

## 2022-11-15 RX ORDER — POTASSIUM CHLORIDE 7.45 MG/ML
10 INJECTION INTRAVENOUS ONCE
Status: COMPLETED | OUTPATIENT
Start: 2022-11-15 | End: 2022-11-15

## 2022-11-15 RX ORDER — KETOROLAC TROMETHAMINE 15 MG/ML
15 INJECTION, SOLUTION INTRAMUSCULAR; INTRAVENOUS ONCE
Status: COMPLETED | OUTPATIENT
Start: 2022-11-15 | End: 2022-11-15

## 2022-11-15 RX ADMIN — DIPHENHYDRAMINE HYDROCHLORIDE 25 MG: 50 INJECTION, SOLUTION INTRAMUSCULAR; INTRAVENOUS at 17:33

## 2022-11-15 RX ADMIN — POTASSIUM CHLORIDE 40 MEQ: 1500 TABLET, EXTENDED RELEASE ORAL at 19:27

## 2022-11-15 RX ADMIN — LORAZEPAM 1 MG: 1 TABLET ORAL at 21:40

## 2022-11-15 RX ADMIN — PROCHLORPERAZINE EDISYLATE 10 MG: 5 INJECTION INTRAMUSCULAR; INTRAVENOUS at 17:34

## 2022-11-15 RX ADMIN — SODIUM CHLORIDE 1000 ML: 9 INJECTION, SOLUTION INTRAVENOUS at 17:33

## 2022-11-15 RX ADMIN — POTASSIUM CHLORIDE 10 MEQ: 7.46 INJECTION, SOLUTION INTRAVENOUS at 19:27

## 2022-11-15 RX ADMIN — KETOROLAC TROMETHAMINE 15 MG: 15 INJECTION, SOLUTION INTRAMUSCULAR; INTRAVENOUS at 17:38

## 2022-11-15 RX ADMIN — MAGNESIUM SULFATE HEPTAHYDRATE 2 G: 40 INJECTION, SOLUTION INTRAVENOUS at 20:56

## 2022-11-15 ASSESSMENT — ACTIVITIES OF DAILY LIVING (ADL)
ADLS_ACUITY_SCORE: 35
ADLS_ACUITY_SCORE: 35
ADLS_ACUITY_SCORE: 33

## 2022-11-15 ASSESSMENT — ENCOUNTER SYMPTOMS
NAUSEA: 1
VOMITING: 1
ABDOMINAL PAIN: 1

## 2022-11-15 NOTE — ED NOTES
Rapid Assessment Note    History:   Humera Lovell is a 42 year old female who presents with nausea and vomiting beginning 4 days ago. She reports a couple days of vomiting, and then felt improved after IV fluids at urgent care yesterday. This morning the patient felt nausea again and describes sharp abdominal pain. She also states that her fingers froze up this morning and mentions recent chills. The patient reports that these symptoms have occurred twice in the past. She has tried Reglan and Zofran, but they do not seem to help and she got a headache. The patient denies cough, rhinorrhea, dysuria, constipation, diarrhea, and history of pancreatic issues. She does note past cholecystectomy. The patient has an upcoming PCP appointment scheduled.     Exam:   General:  Alert, interactive  Cardiovascular:  Well perfused  Lungs:  No respiratory distress, no accessory muscle use  Abdomen: Soft and nontender.  No guarding.  Nondistended.  Neuro:  Moving all 4 extremities  Skin:  Warm, dry  Psych:  Normal affect      Plan of Care:   I evaluated the patient and developed an initial plan of care. I discussed this plan and explained that I, or one of my partners, would be returning to complete the evaluation.       I, Charley Gray, am serving as a scribe to document services personally performed by Ashu Tripathi MD based on my observations and the provider's statements to me.    11/15/2022  [employer]    Portions of this medical record were completed by a scribe. UPON MY REVIEW AND AUTHENTICATION BY ELECTRONIC SIGNATURE, this confirms (a) I performed the applicable clinical services, and (b) the record is accurate.      Ashu Tripathi MD  11/15/22 1094

## 2022-11-15 NOTE — ED NOTES
Pt states she is having difficulty moving her hands.  strength strong and equal bilaterally. No other acute distress noted on across the room assessment.

## 2022-11-15 NOTE — ED TRIAGE NOTES
"Pt presents for evaluation of nausea and emesis since Saturday morning. Pt still dry heaving, but nothing left to vomit. Associated headache and feels dehydrated. Was seen for same symptoms 2 other times. Given Zofran and Reglan for prescriptions. Had fluids in clinic yesterday. Today, \"fingers became stuck in one position\". Drank all Gatorade which has helped. Took tylenol around [id number].      "

## 2022-11-16 ENCOUNTER — OFFICE VISIT (OUTPATIENT)
Dept: PEDIATRICS | Facility: CLINIC | Age: 42
End: 2022-11-16
Payer: COMMERCIAL

## 2022-11-16 ENCOUNTER — TELEPHONE (OUTPATIENT)
Dept: INTERNAL MEDICINE | Facility: CLINIC | Age: 42
End: 2022-11-16
Payer: COMMERCIAL

## 2022-11-16 VITALS
TEMPERATURE: 98.8 F | HEART RATE: 86 BPM | SYSTOLIC BLOOD PRESSURE: 164 MMHG | DIASTOLIC BLOOD PRESSURE: 96 MMHG | OXYGEN SATURATION: 100 % | WEIGHT: 139.3 LBS | BODY MASS INDEX: 21.82 KG/M2

## 2022-11-16 DIAGNOSIS — G44.209 TENSION HEADACHE: ICD-10-CM

## 2022-11-16 DIAGNOSIS — E87.6 LOW BLOOD POTASSIUM: ICD-10-CM

## 2022-11-16 DIAGNOSIS — K29.00 ACUTE GASTRITIS WITHOUT HEMORRHAGE, UNSPECIFIED GASTRITIS TYPE: ICD-10-CM

## 2022-11-16 DIAGNOSIS — R11.2 NAUSEA AND VOMITING, UNSPECIFIED VOMITING TYPE: Primary | ICD-10-CM

## 2022-11-16 DIAGNOSIS — E86.0 DEHYDRATION: ICD-10-CM

## 2022-11-16 DIAGNOSIS — R10.13 EPIGASTRIC PAIN: ICD-10-CM

## 2022-11-16 LAB
ATRIAL RATE - MUSE: 70 BPM
DIASTOLIC BLOOD PRESSURE - MUSE: NORMAL MMHG
INTERPRETATION ECG - MUSE: NORMAL
P AXIS - MUSE: -12 DEGREES
PR INTERVAL - MUSE: 110 MS
QRS DURATION - MUSE: 84 MS
QT - MUSE: 438 MS
QTC - MUSE: 473 MS
R AXIS - MUSE: 44 DEGREES
SYSTOLIC BLOOD PRESSURE - MUSE: NORMAL MMHG
T AXIS - MUSE: 26 DEGREES
VENTRICULAR RATE- MUSE: 70 BPM

## 2022-11-16 PROCEDURE — 99207 REFERRAL TO ACUTE AND DIAGNOSTIC SERVICES: CPT | Performed by: NURSE PRACTITIONER

## 2022-11-16 PROCEDURE — 99214 OFFICE O/P EST MOD 30 MIN: CPT | Performed by: INTERNAL MEDICINE

## 2022-11-16 RX ORDER — PROMETHAZINE HYDROCHLORIDE 25 MG/1
25 SUPPOSITORY RECTAL EVERY 6 HOURS PRN
Qty: 12 SUPPOSITORY | Refills: 1 | Status: SHIPPED | OUTPATIENT
Start: 2022-11-16 | End: 2023-09-13

## 2022-11-16 RX ORDER — ONDANSETRON 2 MG/ML
4 INJECTION INTRAMUSCULAR; INTRAVENOUS
Status: DISCONTINUED | OUTPATIENT
Start: 2022-11-16 | End: 2022-11-16

## 2022-11-16 RX ORDER — SUCRALFATE ORAL 1 G/10ML
1 SUSPENSION ORAL 4 TIMES DAILY
Qty: 414 ML | Refills: 0 | Status: SHIPPED | OUTPATIENT
Start: 2022-11-16 | End: 2023-09-13
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
## 2022-11-16 NOTE — ED PROVIDER NOTES
History   Chief Complaint:  Nausea & Vomiting       The history is provided by the patient.      Humera Lovell is a 42 year old female with history of chronic gastritis and cholecystectomy who presents with nausea and vomiting beginning 4 days ago. She reports a couple days of vomiting, and then felt improved after IV fluids at urgent care yesterday. This morning the patient felt nauseated again and describes sharp abdominal pain. She also states that her fingers froze up this morning and mentions recent chills. The patient reports that these symptoms have occurred twice in the past. She has tried Reglan and Zofran, but they do not seem to help and she got a headache. The patient denies cough, rhinorrhea, dysuria, constipation, diarrhea, and history of pancreatic issues. She has an upcoming PCP appointment scheduled.     Review of Systems   Constitutional: Negative for chills and fever.   Respiratory: Negative for cough and shortness of breath.    Cardiovascular: Negative for chest pain.   Gastrointestinal: Positive for abdominal pain, nausea and vomiting.   Genitourinary: Negative for dysuria.   Skin: Negative for rash and wound.   All other systems reviewed and are negative.      Allergies:  The patient has no known allergies.     Medications:  Flonase  Coreg  Atarax  Ambien    Past Medical History:     NSTEMI  Chronic gastritis  Recurrent MDD  Allergic conjunctivitis and rhinitis     Past Surgical History:    Coronary angiogram  Cholecystectomy  Bilateral leg surgery      Family History:    Diabetes mellitus  Hypertension  Autism spectrum disorder     Social History:  The patient presents to the ED with a friend   Arrived by private vehicle   PCP: Beatriz Cifuentes     Physical Exam     Patient Vitals for the past 24 hrs:   BP Temp Temp src Pulse Resp SpO2 Height Weight   11/15/22 1945 105/88 -- -- -- -- -- -- --   11/15/22 1932 -- -- -- -- -- 100 % -- --   11/15/22 1931 -- -- -- -- -- 92 % -- --   11/15/22  "1930 (!) 161/94 -- -- 78 -- 100 % -- --   11/15/22 1745 (!) 161/90 -- -- -- -- 100 % -- --   11/15/22 1742 (!) 161/90 -- -- -- -- 100 % -- --   11/15/22 1259 118/84 97.6  F (36.4  C) Oral 114 20 100 % 1.702 m (5' 7\") 63.5 kg (140 lb)       Physical Exam  Constitutional: Well appearing.  HEENT: Atraumatic.  Moist mucous membranes.  Neck: Soft.  Supple.  No JVD.  Cardiac: Regular rate and rhythm.  No murmur or rub.  Respiratory: Clear to auscultation bilaterally.  No respiratory distress.  No wheezing, rhonchi, or rales.  Abdomen: Soft and nontender.  No rebound or guarding.  Nondistended.  Musculoskeletal: No edema.  Normal range of motion.  Neurologic: Alert and oriented x3.  Normal tone and bulk.  Normal gait.  Skin: No rashes.  No edema.  Psych: Normal affect.  Normal behavior.        Emergency Department Course   ECG taken at 2133, read at 2145  ECG results from 11/15/22   EKG 12-lead, tracing only     Value    Systolic Blood Pressure     Diastolic Blood Pressure     Ventricular Rate 70    Atrial Rate 70    UT Interval 110    QRS Duration 84        QTc 473    P Axis -12    R AXIS 44    T Axis 26    Interpretation ECG      Sinus rhythm with short UT  Minimal voltage criteria for LVH, may be normal variant  Possible Lateral infarct , age undetermined  Abnormal ECG  No significant change as compared to prior, dated 9/4/2022     Laboratory:  Labs Ordered and Resulted from Time of ED Arrival to Time of ED Departure   COMPREHENSIVE METABOLIC PANEL - Abnormal       Result Value    Sodium 131 (*)     Potassium 2.6 (*)     Chloride 89 (*)     Carbon Dioxide (CO2) 26      Anion Gap 16 (*)     Urea Nitrogen 7.8      Creatinine 0.63      Calcium 9.6      Glucose 110 (*)     Alkaline Phosphatase 58      AST 26      ALT 24      Protein Total 7.2      Albumin 4.3      Bilirubin Total 0.8      GFR Estimate >90     ROUTINE UA WITH MICROSCOPIC REFLEX TO CULTURE - Abnormal    Color Urine Straw      Appearance Urine Clear      " Glucose Urine Negative      Bilirubin Urine Negative      Ketones Urine 20 (*)     Specific Gravity Urine 1.005      Blood Urine Negative      pH Urine 6.5      Protein Albumin Urine Negative      Urobilinogen Urine Normal      Nitrite Urine Negative      Leukocyte Esterase Urine Negative      RBC Urine <1      WBC Urine <1     CBC WITH PLATELETS AND DIFFERENTIAL - Abnormal    WBC Count 5.9      RBC Count 3.59 (*)     Hemoglobin 12.8      Hematocrit 34.5 (*)     MCV 96      MCH 35.7 (*)     MCHC 37.1 (*)     RDW 10.0      Platelet Count 300      % Neutrophils 57      % Lymphocytes 27      % Monocytes 14      % Eosinophils 1      % Basophils 1      % Immature Granulocytes 0      NRBCs per 100 WBC 0      Absolute Neutrophils 3.4      Absolute Lymphocytes 1.6      Absolute Monocytes 0.8      Absolute Eosinophils 0.0      Absolute Basophils 0.0      Absolute Immature Granulocytes 0.0      Absolute NRBCs 0.0     MAGNESIUM - Abnormal    Magnesium 1.5 (*)    INFLUENZA A/B & SARS-COV2 PCR MULTIPLEX - Normal    Influenza A PCR Negative      Influenza B PCR Negative      RSV PCR Negative      SARS CoV2 PCR Negative     LIPASE - Normal    Lipase 24     HCG QUALITATIVE URINE - Normal    hCG Urine Qualitative Negative          Emergency Department Course:     Reviewed:  I reviewed nursing notes, vitals and past medical history    Assessments:  1647 I obtained history and examined the patient as noted above.   1937 The patient passed a PO challenge.   2050 I rechecked the patient and explained findings.   2125 I rechecked and updated the patient.     Interventions:  1733 NS 1 L IV  1733 Benadryl 25 mg IV  1734 Compazine 10 mg IV  1738 Toradol 15 mg IV  1927 potassium chloride 10 mEq IV  1927 Klor-con 40 mEq PO  2056 magnesium sulfate 2 g IV   Ativan 1 mg PO    Disposition:  The patient was discharged to home.     Impression & Plan     Medical Decision Making:  DOS name is a 42-year-old woman who is afebrile and hemodynamically  stable.  Her abdomen is soft and benign on exam with no indication for advanced imaging such as CT scan given her lack of pain or tenderness at this time.  She was given the above interventions with great improvement.  She is multiple electrolyte abnormalities that were replaced.  She go home with replacement and as needed Compazine as that seemed to work well than other antiemetics in the past.  She is tolerating p.o. intake and feels much better is requesting discharge to think is reasonable at this time.  Discussed supportive care at home and strict return precautions were given, and I stressed the need to return if she is not taking p.o. given her electrolyte abnormalities.  She is in agreement understanding.  Discussed primary care follow-up.  Her questions were answered and she was in no distress at time of discharge.      Diagnosis:    ICD-10-CM    1. Nausea and vomiting, unspecified vomiting type  R11.2       2. Hypokalemia  E87.6       3. Hypomagnesemia  E83.42           Discharge Medications:  New Prescriptions    MAGNESIUM OXIDE (MAG-OX) 400 MG TABLET    Take 1 tablet (400 mg) by mouth daily for 5 days    POTASSIUM CHLORIDE ER (K-TAB) 20 MEQ CR TABLET    Take 2 tablets (40 mEq) by mouth daily for 4 days    PROCHLORPERAZINE (COMPAZINE) 10 MG TABLET    Take 1 tablet (10 mg) by mouth every 6 hours as needed for nausea or vomiting       Scribe Disclosure:  I, Charley Gray, am serving as a scribe at 6:47 PM on 11/15/2022 to document services personally performed by Ashu Tripathi MD based on my observations and the provider's statements to me.        Ashu Tripathi MD  11/21/22 [id number]

## 2022-11-16 NOTE — Clinical Note
Beatriz, Unfortunately, we couldn't get labs drawn or an IV today.   Patient was upset, and I my best to support her.   See my note for details. She will come back in the am to have an IV placed by the hospital vascular RN team, and hope for further treatment then. Cleve

## 2022-11-17 ENCOUNTER — OFFICE VISIT (OUTPATIENT)
Dept: PEDIATRICS | Facility: CLINIC | Age: 42
End: 2022-11-17
Payer: COMMERCIAL

## 2022-11-17 VITALS
OXYGEN SATURATION: 100 % | SYSTOLIC BLOOD PRESSURE: 103 MMHG | DIASTOLIC BLOOD PRESSURE: 69 MMHG | TEMPERATURE: 98 F | HEART RATE: 83 BPM

## 2022-11-17 DIAGNOSIS — F19.90 RECREATIONAL DRUG USE: ICD-10-CM

## 2022-11-17 DIAGNOSIS — R10.13 EPIGASTRIC PAIN: ICD-10-CM

## 2022-11-17 DIAGNOSIS — R11.2 NAUSEA AND VOMITING, UNSPECIFIED VOMITING TYPE: Primary | ICD-10-CM

## 2022-11-17 DIAGNOSIS — K29.50 CHRONIC GASTRITIS, PRESENCE OF BLEEDING UNSPECIFIED, UNSPECIFIED GASTRITIS TYPE: ICD-10-CM

## 2022-11-17 LAB
ALBUMIN SERPL BCG-MCNC: 4.4 G/DL (ref 3.5–5.2)
ALP SERPL-CCNC: 53 U/L (ref 35–104)
ALT SERPL W P-5'-P-CCNC: 20 U/L (ref 10–35)
ANION GAP SERPL CALCULATED.3IONS-SCNC: 12 MMOL/L (ref 7–15)
AST SERPL W P-5'-P-CCNC: 20 U/L (ref 10–35)
BASOPHILS # BLD AUTO: 0.1 10E3/UL (ref 0–0.2)
BASOPHILS NFR BLD AUTO: 1 %
BILIRUB SERPL-MCNC: 0.5 MG/DL
BUN SERPL-MCNC: 13.8 MG/DL (ref 6–20)
CALCIUM SERPL-MCNC: 9.3 MG/DL (ref 8.6–10)
CHLORIDE SERPL-SCNC: 96 MMOL/L (ref 98–107)
CREAT SERPL-MCNC: 0.74 MG/DL (ref 0.51–0.95)
CRP SERPL-MCNC: 4.27 MG/L
DEPRECATED HCO3 PLAS-SCNC: 28 MMOL/L (ref 22–29)
EOSINOPHIL # BLD AUTO: 0.2 10E3/UL (ref 0–0.7)
EOSINOPHIL NFR BLD AUTO: 3 %
ERYTHROCYTE [DISTWIDTH] IN BLOOD BY AUTOMATED COUNT: 11.2 % (ref 10–15)
GFR SERPL CREATININE-BSD FRML MDRD: >90 ML/MIN/1.73M2
GLUCOSE SERPL-MCNC: 72 MG/DL (ref 70–99)
HCT VFR BLD AUTO: 35.2 % (ref 35–47)
HGB BLD-MCNC: 12.5 G/DL (ref 11.7–15.7)
HOLD SPECIMEN: NORMAL
IMM GRANULOCYTES # BLD: 0 10E3/UL
IMM GRANULOCYTES NFR BLD: 0 %
LACTATE SERPL-SCNC: 1.6 MMOL/L (ref 0.7–2)
LIPASE SERPL-CCNC: 61 U/L (ref 13–60)
LYMPHOCYTES # BLD AUTO: 1.4 10E3/UL (ref 0.8–5.3)
LYMPHOCYTES NFR BLD AUTO: 28 %
MAGNESIUM SERPL-MCNC: 2.1 MG/DL (ref 1.7–2.3)
MCH RBC QN AUTO: 35.8 PG (ref 26.5–33)
MCHC RBC AUTO-ENTMCNC: 35.5 G/DL (ref 31.5–36.5)
MCV RBC AUTO: 101 FL (ref 78–100)
MONOCYTES # BLD AUTO: 0.7 10E3/UL (ref 0–1.3)
MONOCYTES NFR BLD AUTO: 14 %
NEUTROPHILS # BLD AUTO: 2.8 10E3/UL (ref 1.6–8.3)
NEUTROPHILS NFR BLD AUTO: 54 %
NRBC # BLD AUTO: 0 10E3/UL
NRBC BLD AUTO-RTO: 0 /100
PLATELET # BLD AUTO: 290 10E3/UL (ref 150–450)
POTASSIUM SERPL-SCNC: 3.5 MMOL/L (ref 3.4–5.3)
PROT SERPL-MCNC: 7.4 G/DL (ref 6.4–8.3)
RBC # BLD AUTO: 3.49 10E6/UL (ref 3.8–5.2)
SODIUM SERPL-SCNC: 136 MMOL/L (ref 136–145)
VIT B12 SERPL-MCNC: 1343 PG/ML (ref 232–1245)
WBC # BLD AUTO: 5.2 10E3/UL (ref 4–11)

## 2022-11-17 PROCEDURE — 86140 C-REACTIVE PROTEIN: CPT | Performed by: PREVENTIVE MEDICINE

## 2022-11-17 PROCEDURE — 99215 OFFICE O/P EST HI 40 MIN: CPT | Mod: 25 | Performed by: PREVENTIVE MEDICINE

## 2022-11-17 PROCEDURE — 99417 PROLNG OP E/M EACH 15 MIN: CPT | Performed by: PREVENTIVE MEDICINE

## 2022-11-17 PROCEDURE — 36415 COLL VENOUS BLD VENIPUNCTURE: CPT | Performed by: PREVENTIVE MEDICINE

## 2022-11-17 PROCEDURE — 83605 ASSAY OF LACTIC ACID: CPT | Performed by: PREVENTIVE MEDICINE

## 2022-11-17 PROCEDURE — 96360 HYDRATION IV INFUSION INIT: CPT | Performed by: PREVENTIVE MEDICINE

## 2022-11-17 PROCEDURE — 83690 ASSAY OF LIPASE: CPT | Performed by: PREVENTIVE MEDICINE

## 2022-11-17 PROCEDURE — 85025 COMPLETE CBC W/AUTO DIFF WBC: CPT | Performed by: PREVENTIVE MEDICINE

## 2022-11-17 PROCEDURE — 82607 VITAMIN B-12: CPT | Performed by: PREVENTIVE MEDICINE

## 2022-11-17 PROCEDURE — 83735 ASSAY OF MAGNESIUM: CPT | Performed by: PREVENTIVE MEDICINE

## 2022-11-17 PROCEDURE — 86364 TISS TRNSGLTMNASE EA IG CLAS: CPT | Performed by: PREVENTIVE MEDICINE

## 2022-11-17 PROCEDURE — 80053 COMPREHEN METABOLIC PANEL: CPT | Performed by: PREVENTIVE MEDICINE

## 2022-11-17 RX ORDER — FAMOTIDINE 40 MG/1
40 TABLET, FILM COATED ORAL DAILY
Qty: 30 TABLET | Refills: 0 | Status: SHIPPED | OUTPATIENT
Start: 2022-11-17 | End: 2022-12-17

## 2022-11-17 RX ORDER — ACETAMINOPHEN 500 MG
1000 TABLET ORAL ONCE
Status: COMPLETED | OUTPATIENT
Start: 2022-11-17 | End: 2022-11-17

## 2022-11-17 RX ADMIN — Medication 1000 MG: at 10:57

## 2022-11-17 RX ADMIN — Medication 1000 ML: at 11:03

## 2022-11-17 NOTE — PATIENT INSTRUCTIONS
PLAN:  1.  Phenergan suppositories twice daily as a regular dose, but may use up to every 6 hours  2.  Don't start taking potassium supplement until abdominal pains are improved  3.  Once nausea is better, start taking carafate four times daily -  Until abdominal pain is resolved  4.  Plan for follow-up visit tomorrow morning at the [facility] clinic at 9:30 am.  We are hopeful that IV can be placed by hospital team, and then patient to have labs done and IV fluid replacement

## 2022-11-17 NOTE — PROGRESS NOTES
Assessment & Plan     Nausea and vomiting, unspecified vomiting type  Chronic gastritis, presence of bleeding unspecified, unspecified gastritis type  Epigastric pain  Recreational drug use  - CBC with platelets and differential  - Comprehensive metabolic panel  - Helicobacter pylori Antigen Stool  - Lipase  - CRP, inflammation  - Lactic acid whole blood  - Magnesium  - 0.9% sodium chloride BOLUS  - acetaminophen (TYLENOL) tablet 1,000 mg  - sodium chloride (PF) 0.9% PF flush 5 mL  - Vitamin B12  - Tissue transglutaminase diogo IgA and IgG  - Adult GI  Referral - Consult Only; Future  - famotidine (PEPCID) 40 MG tablet; Take 1 tablet (40 mg) by mouth daily for 30 days    - Adult GI  Referral - Consult Only; Future    Suspect THC gummies and alcohol in addition to NSAIDS may be playing a role.  ?PUD, gastritis  Advised cessation of all  Start pepcid 40 mg daily  TTG, b12 and h pylori pending  Use tylenol 500 mg three times per day as needed for pain in addition to pepcid  Zofran for nausea  Follow up with GI in next 1-2 weeks for consult.  Would benefit from EGD I believe.  Avoid spicy and acidic foods as well.  Follow up with pcp in 1-2 weeks as well.        129 minutes spent on the date of the encounter doing chart review, review of outside records, review of test results, interpretation of tests, patient visit, documentation and discussion with other provider(s)        See Patient Instructions    Return in about 1 week (around 11/24/2022) for Follow up.    Tristen Kurtz MD, MD  [facility] ANNE-MARIE Grubbs is a 42 year old, presenting for the following health issues:  Headache and Gastrointestinal Problem      HPI     Vomitting  Onset/Duration: About 6 days   How many times in last 24 hours: 0, better with Zofran   What is the appearance: Bile/liquid   Nausea: no    What has intake been like: Fluids, crackers, half of a banana  Any abdominal pain:  YES- much improved, residual ache  Character: Dull ache  Location: epigastric region  Radiation: None  Intensity: Denies today, much improved since yesterday  Progression of Symptoms:  Improved with Zofran  Accompanying Signs & Symptoms:  Fever/Chills: no   Gas/Bloating: no   Diarrhea: no   Constipation: Unsure; usually a problem  Dysuria or Hematuria: no   Other factors:  YES- headache, improving, blurred vision  Therapies tried and outcome: Zofran, Ibuprofen, Benadryl    This is a 41 yo female who has a history chronic gastritis, cholecystectomy, epigastric pain, NSTEMI.  She presents with ongoing nausea and vomiting over the past 1-2 weeks.  She has had 2 clinic visits and 2 ED visits over that time.  Has a ct scan showing transverse colitis.  Has had electrolyte replacement and iv fluids.  Since yesterday, she feels markedly improved.  Today, denies n/v/d/c/blood in stool or black stool.  No pain with urination or blood in urine.  Does have some pain after food intake.      Admits to occasionally drinking alcohol.  Also vapes daily and used  Delta 8 and Delta 9 thc gummies regularly.  No travel or sick contacts.  Also ibuprofen is used regularly.  No recent EGD.    HCG and UA negative in ED on 11/15.  SHe had nl LFT, and creatinine, nl WBC, was hypokalemia and hypomagnesemic and hyponatremic on 11/15 and has IV fluids and potassium replacement on that day.    Review of Systems   Constitutional, HEENT, cardiovascular, pulmonary, GI, , musculoskeletal, neuro, skin, endocrine and psych systems are negative, except as otherwise noted.      Objective    /69 (BP Location: Left arm, Patient Position: Sitting)   Pulse 83   Temp 98  F (36.7  C) (Oral)   SpO2 100%   There is no height or weight on file to calculate BMI.  Physical Exam   GENERAL: healthy, alert and no distress  EYES: Eyes grossly normal to inspection, PERRL and conjunctivae and sclerae normal  HENT: ear canals and TM's normal, nose and mouth  without ulcers or lesions  NECK: no adenopathy, no asymmetry, masses, or scars and thyroid normal to palpation  RESP: lungs clear to auscultation - no rales, rhonchi or wheezes  CV: regular rate and rhythm, normal S1 S2, no S3 or S4, no murmur, click or rub, no peripheral edema and peripheral pulses strong  ABDOMEN: soft, nontender, no hepatosplenomegaly, no masses and bowel sounds normal  MS: no gross musculoskeletal defects noted, no edema  SKIN: no suspicious lesions or rashes  NEURO: Normal strength and tone, mentation intact and speech normal  PSYCH: mentation appears normal, affect normal/bright    Results for orders placed or performed in visit on 11/17/22 (from the past 24 hour(s))   CBC with platelets and differential    Narrative    The following orders were created for panel order CBC with platelets and differential.  Procedure                               Abnormality         Status                     ---------                               -----------         ------                     CBC with platelets and d...[[id number]]  Abnormal            Final result                 Please view results for these tests on the individual orders.   Comprehensive metabolic panel   Result Value Ref Range    Sodium 136 136 - 145 mmol/L    Potassium 3.5 3.4 - 5.3 mmol/L    Chloride 96 (L) 98 - 107 mmol/L    Carbon Dioxide (CO2) 28 22 - 29 mmol/L    Anion Gap 12 7 - 15 mmol/L    Urea Nitrogen 13.8 6.0 - 20.0 mg/dL    Creatinine 0.74 0.51 - 0.95 mg/dL    Calcium 9.3 8.6 - 10.0 mg/dL    Glucose 72 70 - 99 mg/dL    Alkaline Phosphatase 53 35 - 104 U/L    AST 20 10 - 35 U/L    ALT 20 10 - 35 U/L    Protein Total 7.4 6.4 - 8.3 g/dL    Albumin 4.4 3.5 - 5.2 g/dL    Bilirubin Total 0.5 <=1.2 mg/dL    GFR Estimate >90 >60 mL/min/1.73m2   Lipase   Result Value Ref Range    Lipase 61 (H) 13 - 60 U/L   CRP, inflammation   Result Value Ref Range    CRP Inflammation 4.27 <5.00 mg/L   Lactic acid whole blood   Result Value Ref Range     Lactic Acid 1.6 0.7 - 2.0 mmol/L   Magnesium   Result Value Ref Range    Magnesium 2.1 1.7 - 2.3 mg/dL   CBC with platelets and differential   Result Value Ref Range    WBC Count 5.2 4.0 - 11.0 10e3/uL    RBC Count 3.49 (L) 3.80 - 5.20 10e6/uL    Hemoglobin 12.5 11.7 - 15.7 g/dL    Hematocrit 35.2 35.0 - 47.0 %     (H) 78 - 100 fL    MCH 35.8 (H) 26.5 - 33.0 pg    MCHC 35.5 31.5 - 36.5 g/dL    RDW 11.2 10.0 - 15.0 %    Platelet Count 290 150 - 450 10e3/uL    % Neutrophils 54 %    % Lymphocytes 28 %    % Monocytes 14 %    % Eosinophils 3 %    % Basophils 1 %    % Immature Granulocytes 0 %    NRBCs per 100 WBC 0 <1 /100    Absolute Neutrophils 2.8 1.6 - 8.3 10e3/uL    Absolute Lymphocytes 1.4 0.8 - 5.3 10e3/uL    Absolute Monocytes 0.7 0.0 - 1.3 10e3/uL    Absolute Eosinophils 0.2 0.0 - 0.7 10e3/uL    Absolute Basophils 0.1 0.0 - 0.2 10e3/uL    Absolute Immature Granulocytes 0.0 <=0.4 10e3/uL    Absolute NRBCs 0.0 10e3/uL   Extra Tube    Narrative    The following orders were created for panel order Extra Tube.  Procedure                               Abnormality         Status                     ---------                               -----------         ------                     Extra Red Top Tube[[id number]]                                                          Extra Green Top (Lithium...[[id number]]                      Final result               Extra Purple Top Tube[[id number]]                                                         Please view results for these tests on the individual orders.   Extra Green Top (Lithium Heparin) Tube   Result Value Ref Range    Hold Specimen JIC

## 2022-11-17 NOTE — PATIENT INSTRUCTIONS
Zofran for nausea, Tylenol 500 mg three times per day both as needed  Avoid all alcohol and recreational drugs  Start pepcid 40 mg daily - prescribed  Follow up with GI asap.  Follow up with pcp in 1-2 weeks.  H pylori and ttg tests pending

## 2022-11-18 LAB
TTG IGA SER-ACNC: 1.1 U/ML
TTG IGG SER-ACNC: <0.6 U/ML

## 2022-11-19 ENCOUNTER — HEALTH MAINTENANCE LETTER (OUTPATIENT)
Age: 42
End: 2022-11-19

## 2022-11-21 ASSESSMENT — ENCOUNTER SYMPTOMS
FEVER: 0
SHORTNESS OF BREATH: 0
CHILLS: 0
COUGH: 0
DYSURIA: 0
WOUND: 0

## 2022-11-21 NOTE — TELEPHONE ENCOUNTER
REFERRAL INFORMATION:    Referring Provider:  Jerardo    Referring Clinic:  [facility]    Reason for Visit/Diagnosis: nausea, vomiting, chronic gastritis     FUTURE VISIT INFORMATION:    Appointment Date: 12.13.22    Appointment Time: 3 PM     NOTES STATUS DETAILS   OFFICE NOTE from Referring Provider Internal 11.17.22 [facility] Kurtz    OFFICE NOTE from Other Specialist     HOSPITAL DISCHARGE SUMMARY/  ED VISITS Internal 11.15.22 [facility] Tripathi  11.14.22 [facility] More  11.12.22 [facility]  9.4.22 Wendy, [facility]   More in [facility]   OPERATIVE REPORT     MEDICATION LIST Internal         ENDOSCOPY      COLONOSCOPY     ERCP     EUS     STOOL TESTING     PERTINENT LABS Internal    PATHOLOGY REPORTS (RELATED)     IMAGING (CT, MRI, EGD, MRCP, Small Bowel Follow Through/SBT, MR/CT Enterography) Internal 9.4.22 CT ab pelvis

## 2022-12-08 ENCOUNTER — TELEPHONE (OUTPATIENT)
Dept: GASTROENTEROLOGY | Facility: CLINIC | Age: 42
End: 2022-12-08

## 2022-12-08 NOTE — TELEPHONE ENCOUNTER
Called to remind patient of their upcoming appointment with our GI clinic, on Tuesday 12/13/2022 at 2:45PM with Dr. Sánchez. This appointment is scheduled as an in-person appt. Please arrive 15 minutes early to check in for your appointment. , if your appointment is virtual (video or telephone) you need to be in [place] for the visit. To reschedule or cancel patient to call [phone].    Daniella Azevedo MA

## 2022-12-13 ENCOUNTER — OFFICE VISIT (OUTPATIENT)
Dept: GASTROENTEROLOGY | Facility: CLINIC | Age: 42
End: 2022-12-13
Attending: PREVENTIVE MEDICINE
Payer: COMMERCIAL

## 2022-12-13 ENCOUNTER — PRE VISIT (OUTPATIENT)
Dept: GASTROENTEROLOGY | Facility: CLINIC | Age: 42
End: 2022-12-13

## 2022-12-13 VITALS
HEART RATE: 85 BPM | WEIGHT: 141.2 LBS | HEIGHT: 67 IN | OXYGEN SATURATION: 100 % | BODY MASS INDEX: 22.16 KG/M2 | SYSTOLIC BLOOD PRESSURE: 105 MMHG | DIASTOLIC BLOOD PRESSURE: 70 MMHG

## 2022-12-13 DIAGNOSIS — R11.2 NAUSEA AND VOMITING, UNSPECIFIED VOMITING TYPE: ICD-10-CM

## 2022-12-13 DIAGNOSIS — K29.50 CHRONIC GASTRITIS, PRESENCE OF BLEEDING UNSPECIFIED, UNSPECIFIED GASTRITIS TYPE: ICD-10-CM

## 2022-12-13 PROCEDURE — 99204 OFFICE O/P NEW MOD 45 MIN: CPT | Mod: GC | Performed by: STUDENT IN AN ORGANIZED HEALTH CARE EDUCATION/TRAINING PROGRAM

## 2022-12-13 ASSESSMENT — PAIN SCALES - GENERAL: PAINLEVEL: NO PAIN (0)

## 2022-12-13 NOTE — PATIENT INSTRUCTIONS
It was nice meeting you today!    For your nausea/vomiting, you do not need an upper endoscopy at this time. Continue to avoid the THC gummies, as well as NSAIDs.     Please get your H,pylori stool test.    We would recommend getting a colonoscopy.     Follow-up with your primary care doctor otherwise.      Please let us know if you have any questions or concerns!

## 2022-12-13 NOTE — NURSING NOTE
"Chief Complaint   Patient presents with     New Patient       Vitals:    12/13/22 1444   BP: 105/70   Pulse: 85   SpO2: 100%   Weight: 64 kg (141 lb 3.2 oz)   Height: 1.702 m (5' 7\")       Body mass index is 22.12 kg/m .    OMA Brooke  "
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
## 2022-12-28 ENCOUNTER — TELEPHONE (OUTPATIENT)
Dept: GASTROENTEROLOGY | Facility: CLINIC | Age: 42
End: 2022-12-28

## 2022-12-28 NOTE — TELEPHONE ENCOUNTER
Scheduling team attempted to reach out to patient to schedule colonoscopy, however were unable to reach her.     Writer contacted patient and was able to get in touch. Patient declined to be transferred to schedule colonoscopy at this time, however requested the scheduling number. Writer provided it to the patient and notified her that the providers are already booking out into February and March. Patient expressed understanding.

## 2022-12-28 NOTE — TELEPHONE ENCOUNTER
----- Message from Mirian Martin RN sent at 12/13/2022  3:38 PM CST -----  Regarding: FW: Schedule colonoscopy  Please see request below. Let me know if you have questions or are not able to schedule this patient.     Thanks,  Flaco  ----- Message -----  From: Lucien Sánchez MD  Sent: 12/13/2022   3:35 PM CST  To: Mirian Martin RN  Subject: Schedule colonoscopy                             Robert Vela,     Would you be able to help schedule a colonoscopy for Ms. Lovell? It's a non-urgent procedure, can happen at any time. Thank you!    Lucien Sánchez  PGY-4 GI fellow

## 2023-06-02 ENCOUNTER — HOSPITAL ENCOUNTER (EMERGENCY)
Facility: CLINIC | Age: 43
Discharge: HOME | End: 2023-06-02
Attending: PHYSICIAN ASSISTANT | Admitting: PHYSICIAN ASSISTANT
Payer: COMMERCIAL

## 2023-06-02 ENCOUNTER — APPOINTMENT (OUTPATIENT)
Dept: CT IMAGING | Facility: CLINIC | Age: 43
End: 2023-06-02
Attending: PHYSICIAN ASSISTANT
Payer: COMMERCIAL

## 2023-06-02 VITALS
HEART RATE: 64 BPM | BODY MASS INDEX: 21.97 KG/M2 | WEIGHT: 140 LBS | RESPIRATION RATE: 14 BRPM | DIASTOLIC BLOOD PRESSURE: 58 MMHG | HEIGHT: 67 IN | OXYGEN SATURATION: 99 % | SYSTOLIC BLOOD PRESSURE: 104 MMHG | TEMPERATURE: 97.6 F

## 2023-06-02 DIAGNOSIS — R10.13 EPIGASTRIC PAIN: ICD-10-CM

## 2023-06-02 DIAGNOSIS — R11.2 NAUSEA AND VOMITING: ICD-10-CM

## 2023-06-02 LAB
ALBUMIN SERPL BCG-MCNC: 4.4 G/DL (ref 3.5–5.2)
ALP SERPL-CCNC: 55 U/L (ref 35–104)
ALT SERPL W P-5'-P-CCNC: 21 U/L (ref 10–35)
ANION GAP SERPL CALCULATED.3IONS-SCNC: 15 MMOL/L (ref 7–15)
AST SERPL W P-5'-P-CCNC: 30 U/L (ref 10–35)
BASOPHILS # BLD AUTO: 0 10E3/UL (ref 0–0.2)
BASOPHILS NFR BLD AUTO: 1 %
BILIRUB SERPL-MCNC: 0.3 MG/DL
BUN SERPL-MCNC: 10.9 MG/DL (ref 6–20)
CALCIUM SERPL-MCNC: 9.3 MG/DL (ref 8.6–10)
CHLORIDE SERPL-SCNC: 103 MMOL/L (ref 98–107)
CREAT SERPL-MCNC: 0.63 MG/DL (ref 0.51–0.95)
DEPRECATED HCO3 PLAS-SCNC: 19 MMOL/L (ref 22–29)
EOSINOPHIL # BLD AUTO: 0 10E3/UL (ref 0–0.7)
EOSINOPHIL NFR BLD AUTO: 0 %
ERYTHROCYTE [DISTWIDTH] IN BLOOD BY AUTOMATED COUNT: 11.5 % (ref 10–15)
GFR SERPL CREATININE-BSD FRML MDRD: >90 ML/MIN/1.73M2
GLUCOSE SERPL-MCNC: 175 MG/DL (ref 70–99)
HCG SER QL IA.RAPID: NEGATIVE
HCG SERPL QL: NEGATIVE
HCT VFR BLD AUTO: 34.8 % (ref 35–47)
HGB BLD-MCNC: 12.6 G/DL (ref 11.7–15.7)
HOLD SPECIMEN: NORMAL
IMM GRANULOCYTES # BLD: 0.1 10E3/UL
IMM GRANULOCYTES NFR BLD: 1 %
LIPASE SERPL-CCNC: 23 U/L (ref 13–60)
LYMPHOCYTES # BLD AUTO: 1.3 10E3/UL (ref 0.8–5.3)
LYMPHOCYTES NFR BLD AUTO: 16 %
MCH RBC QN AUTO: 35.8 PG (ref 26.5–33)
MCHC RBC AUTO-ENTMCNC: 36.2 G/DL (ref 31.5–36.5)
MCV RBC AUTO: 99 FL (ref 78–100)
MONOCYTES # BLD AUTO: 0.4 10E3/UL (ref 0–1.3)
MONOCYTES NFR BLD AUTO: 4 %
NEUTROPHILS # BLD AUTO: 6.6 10E3/UL (ref 1.6–8.3)
NEUTROPHILS NFR BLD AUTO: 78 %
NRBC # BLD AUTO: 0 10E3/UL
NRBC BLD AUTO-RTO: 0 /100
PLATELET # BLD AUTO: 275 10E3/UL (ref 150–450)
POTASSIUM SERPL-SCNC: 3.2 MMOL/L (ref 3.4–5.3)
PROT SERPL-MCNC: 7.2 G/DL (ref 6.4–8.3)
RBC # BLD AUTO: 3.52 10E6/UL (ref 3.8–5.2)
SODIUM SERPL-SCNC: 137 MMOL/L (ref 136–145)
TROPONIN T SERPL HS-MCNC: 6 NG/L
WBC # BLD AUTO: 8.4 10E3/UL (ref 4–11)

## 2023-06-02 PROCEDURE — 74177 CT ABD & PELVIS W/CONTRAST: CPT

## 2023-06-02 PROCEDURE — 96376 TX/PRO/DX INJ SAME DRUG ADON: CPT

## 2023-06-02 PROCEDURE — 84703 CHORIONIC GONADOTROPIN ASSAY: CPT | Performed by: PHYSICIAN ASSISTANT

## 2023-06-02 PROCEDURE — 96374 THER/PROPH/DIAG INJ IV PUSH: CPT | Mod: 59

## 2023-06-02 PROCEDURE — 84703 CHORIONIC GONADOTROPIN ASSAY: CPT

## 2023-06-02 PROCEDURE — 99285 EMERGENCY DEPT VISIT HI MDM: CPT | Mod: 25

## 2023-06-02 PROCEDURE — 83690 ASSAY OF LIPASE: CPT | Performed by: PHYSICIAN ASSISTANT

## 2023-06-02 PROCEDURE — 250N000011 HC RX IP 250 OP 636: Performed by: PHYSICIAN ASSISTANT

## 2023-06-02 PROCEDURE — 93005 ELECTROCARDIOGRAM TRACING: CPT

## 2023-06-02 PROCEDURE — 250N000013 HC RX MED GY IP 250 OP 250 PS 637: Performed by: PHYSICIAN ASSISTANT

## 2023-06-02 PROCEDURE — 36415 COLL VENOUS BLD VENIPUNCTURE: CPT | Performed by: PHYSICIAN ASSISTANT

## 2023-06-02 PROCEDURE — 85025 COMPLETE CBC W/AUTO DIFF WBC: CPT | Performed by: PHYSICIAN ASSISTANT

## 2023-06-02 PROCEDURE — 96375 TX/PRO/DX INJ NEW DRUG ADDON: CPT

## 2023-06-02 PROCEDURE — 84484 ASSAY OF TROPONIN QUANT: CPT | Performed by: PHYSICIAN ASSISTANT

## 2023-06-02 PROCEDURE — 80053 COMPREHEN METABOLIC PANEL: CPT | Performed by: PHYSICIAN ASSISTANT

## 2023-06-02 RX ORDER — MAGNESIUM HYDROXIDE/ALUMINUM HYDROXICE/SIMETHICONE 120; 1200; 1200 MG/30ML; MG/30ML; MG/30ML
15 SUSPENSION ORAL ONCE
Status: COMPLETED | OUTPATIENT
Start: 2023-06-02 | End: 2023-06-02

## 2023-06-02 RX ORDER — SUCRALFATE ORAL 1 G/10ML
1 SUSPENSION ORAL 4 TIMES DAILY PRN
Qty: 210 ML | Refills: 0 | Status: SHIPPED | OUTPATIENT
Start: 2023-06-02 | End: 2023-09-13

## 2023-06-02 RX ORDER — DIPHENHYDRAMINE HYDROCHLORIDE 50 MG/ML
25 INJECTION INTRAMUSCULAR; INTRAVENOUS ONCE
Status: COMPLETED | OUTPATIENT
Start: 2023-06-02 | End: 2023-06-02

## 2023-06-02 RX ORDER — METOCLOPRAMIDE 5 MG/1
5 TABLET ORAL 3 TIMES DAILY PRN
Qty: 12 TABLET | Refills: 0 | Status: SHIPPED | OUTPATIENT
Start: 2023-06-02 | End: 2023-09-13

## 2023-06-02 RX ORDER — SUCRALFATE ORAL 1 G/10ML
1 SUSPENSION ORAL ONCE
Status: COMPLETED | OUTPATIENT
Start: 2023-06-02 | End: 2023-06-02

## 2023-06-02 RX ORDER — HYDROMORPHONE HYDROCHLORIDE 1 MG/ML
0.5 INJECTION, SOLUTION INTRAMUSCULAR; INTRAVENOUS; SUBCUTANEOUS
Status: COMPLETED | OUTPATIENT
Start: 2023-06-02 | End: 2023-06-02

## 2023-06-02 RX ORDER — FAMOTIDINE 20 MG/1
20 TABLET, FILM COATED ORAL 2 TIMES DAILY
Qty: 28 TABLET | Refills: 0 | Status: SHIPPED | OUTPATIENT
Start: 2023-06-02 | End: 2023-06-02

## 2023-06-02 RX ORDER — ONDANSETRON 4 MG/1
4 TABLET, ORALLY DISINTEGRATING ORAL EVERY 8 HOURS PRN
Qty: 10 TABLET | Refills: 0 | Status: SHIPPED | OUTPATIENT
Start: 2023-06-02 | End: 2023-06-02

## 2023-06-02 RX ORDER — IOPAMIDOL 755 MG/ML
70 INJECTION, SOLUTION INTRAVASCULAR ONCE
Status: COMPLETED | OUTPATIENT
Start: 2023-06-02 | End: 2023-06-02

## 2023-06-02 RX ORDER — METOCLOPRAMIDE HYDROCHLORIDE 5 MG/ML
5 INJECTION INTRAMUSCULAR; INTRAVENOUS ONCE
Status: COMPLETED | OUTPATIENT
Start: 2023-06-02 | End: 2023-06-02

## 2023-06-02 RX ORDER — ONDANSETRON 2 MG/ML
4 INJECTION INTRAMUSCULAR; INTRAVENOUS EVERY 30 MIN PRN
Status: DISCONTINUED | OUTPATIENT
Start: 2023-06-02 | End: 2023-06-02 | Stop reason: HOSPADM

## 2023-06-02 RX ADMIN — HYDROMORPHONE HYDROCHLORIDE 0.5 MG: 1 INJECTION, SOLUTION INTRAMUSCULAR; INTRAVENOUS; SUBCUTANEOUS at 13:10

## 2023-06-02 RX ADMIN — IOPAMIDOL 70 ML: 755 INJECTION, SOLUTION INTRAVENOUS at 12:24

## 2023-06-02 RX ADMIN — DIPHENHYDRAMINE HYDROCHLORIDE 25 MG: 50 INJECTION INTRAMUSCULAR; INTRAVENOUS at 13:08

## 2023-06-02 RX ADMIN — ALUMINUM HYDROXIDE, MAGNESIUM HYDROXIDE, AND DIMETHICONE 15 ML: 200; 20; 200 SUSPENSION ORAL at 11:46

## 2023-06-02 RX ADMIN — HYDROMORPHONE HYDROCHLORIDE 0.5 MG: 1 INJECTION, SOLUTION INTRAMUSCULAR; INTRAVENOUS; SUBCUTANEOUS at 12:12

## 2023-06-02 RX ADMIN — HYDROMORPHONE HYDROCHLORIDE 0.5 MG: 1 INJECTION, SOLUTION INTRAMUSCULAR; INTRAVENOUS; SUBCUTANEOUS at 12:35

## 2023-06-02 RX ADMIN — ONDANSETRON 4 MG: 2 INJECTION INTRAMUSCULAR; INTRAVENOUS at 11:40

## 2023-06-02 RX ADMIN — METOCLOPRAMIDE HYDROCHLORIDE 5 MG: 5 INJECTION INTRAMUSCULAR; INTRAVENOUS at 13:08

## 2023-06-02 RX ADMIN — SUCRALFATE ORAL 1 G: 1 SUSPENSION ORAL at 12:35

## 2023-06-02 ASSESSMENT — ACTIVITIES OF DAILY LIVING (ADL): ADLS_ACUITY_SCORE: 35

## 2023-06-02 NOTE — ED TRIAGE NOTES
Presents to ED with epigastric pain started two hours ago, associated with 4 episodes of vomiting. Pt thinks it was something she ate. Pt hyperventilating in triage. ABCs intact. A&OX4. Pt took ibuprofen and zofran but threw both up. Unable to obtain temp in triage.      Triage Assessment     Row Name 06/02/23 1113       Triage Assessment (Adult)    Airway WDL WDL       Respiratory WDL    Respiratory WDL WDL       Skin Circulation/Temperature WDL    Skin Circulation/Temperature WDL WDL       Cardiac WDL    Cardiac WDL WDL       Peripheral/Neurovascular WDL    Peripheral Neurovascular WDL WDL       Cognitive/Neuro/Behavioral WDL    Cognitive/Neuro/Behavioral WDL WDL

## 2023-06-02 NOTE — ED NOTES
Pt dry heaving wanting oral medication to help with pain, not wanting to wait for anti-nausea medication to work first. Brought pt hot pack for comfort which applied to abdominal area.

## 2023-06-02 NOTE — DISCHARGE INSTRUCTIONS
The cause of your symptoms is unclear at this time. There is no evidence for a life-threatening cause based upon your labs and imaging. Take Reglan 5mg as prescribed WITH Benadryl 25mg to see if this helps should you become nauseated. Use Carafate to help with stomach pain. Use Tylenol 1000mg every 8 hours for pain. Avoid ibuprofen use if this is possibly related to your stomach.

## 2023-06-02 NOTE — ED NOTES
Returned from imaging, requesting more pain medication, hyperventilating, and dry heaving. Dry heaving stops for a short amt of time after pain medication administered.

## 2023-06-02 NOTE — ED PROVIDER NOTES
History     Chief Complaint:  Abdominal Pain       The history is provided by the patient.      Humera Lovell is a 42 year old female who presents with abdominal pain. Patient developed sudden-onset, sharp abdominal pain about 2 hours ago followed by 4-5 episodes of emesis. The pain is localized to the upper abdomen and has not radiated elsewhere. She also endorses an unverified fever and chills around the time she first vomited. She tried taking ibuprofen and anti-nausea medication, but was not able to keep them down. Patient has a history of similar abdominal pain and vomiting. She denies chest pain, diarrhea, black/bloody stools, or urinary symptoms.    Independent Historian:   None - Patient Only    Review of External Notes:   GI visit on 12/13/22  ASSESSMENT AND PLAN:     Ms. Lovell is a 42 years old F with PMH of GERD, prior cholecystectomy who presents to the GI clinic to establish care for nausea, vomiting.      Patient with few episodes of nausea/vomiting during this year. One during her visit in [place] after food poisoning, and 2 episodes during this fall. Prior CT abdomen/pelvis unremarkable, normal celiac serologies, normal liver enzymes. Pending H.pylori stool antigen.      Patient's symptoms could be 2/2 to food poisoning vs gastritis/PUD vs medication-induced (THC gummies, NSAIDs). Symptoms have now resolved. No red flags present.      #. Nausea/vomiting  #. GERD  -- No indication for EGD at this time given absence of red flags.  -- Continue Pepcid PRN.  -- Advised to avoid NSAIDs, THC products.   -- Recommend H.pylori stool antigen.   -- RTC PRN.      #. Colon wall thickening  -- Incidentally found on CT abdomen/pelvis. Found to have transverse colon wall thickening.   -- Given incidental CT finding and age approaching 46, would recommend colonoscopy for further evaluation.     Medications:    The patient is not currently taking any prescribed medications.    Past Medical History:    Anxiety  Major  "depressive disorder  Other insomnia  Femur fracture, bilateral  Ankle fracture    Past Surgical History:    Coronary angiogram  Unspecified gallbladder surgery  Bilateral femur fracture repair  Ankle repair    Physical Exam     Patient Vitals for the past 24 hrs:   BP Temp Temp src Pulse Resp SpO2 Height Weight   06/02/23 1415 104/58 -- -- 64 14 99 % -- --   06/02/23 1400 110/50 -- -- 64 -- 100 % -- --   06/02/23 1345 111/57 -- -- 66 -- 96 % -- --   06/02/23 1330 129/75 -- -- 70 -- -- -- --   06/02/23 1315 122/88 -- -- 80 -- 100 % -- --   06/02/23 1245 (!) 123/96 -- -- 67 -- -- -- --   06/02/23 1215 (!) 173/85 -- -- 64 -- 100 % -- --   06/02/23 1130 129/80 -- -- -- -- 97 % -- --   06/02/23 1128 -- 97.6  F (36.4  C) Oral -- -- -- -- --   06/02/23 1114 (!) 145/101 -- -- 62 24 99 % 1.702 m (5' 7\") 63.5 kg (140 lb)        Physical Exam  Constitutional: Pleasant. Cooperative.   Eyes: Pupils equally round and reactive  HENT: Head is normal in appearance. Oropharynx is normal with moist mucus membranes.  Cardiovascular: Regular rate and rhythm and without murmurs.  Respiratory: Normal respiratory effort, lungs are clear bilaterally.  GI: TTP to epigastrium, otherwise non-tender, soft, non-distended. No guarding, rebound, or rigidity.  Musculoskeletal: No asymmetry of the lower extremities.  Skin: Normal, without rash.  Neurologic: Cranial nerves grossly intact, normal cognition, no focal deficits. Alert and oriented x 3.   Psychiatric: Normal affect.  Nursing notes and vital signs reviewed.      Emergency Department Course   ECG  ECG taken at 1158, ECG read at 1202  Sinus bradycardia. Minimal voltage criteria for LVH, may be normal variant.   Rate 57 bpm. MD interval 138 ms. QRS duration 88 ms. QT/QTc 492/478 ms. P-R-T axes 68 66 40.     Imaging:  CT Chest/Abdomen/Pelvis w Contrast   Final Result   IMPRESSION:    1. No evidence of acute pathology in the abdomen or pelvis.   2. Bilateral adnexal cystic areas, indeterminate, " could represent   bilateral ovarian cysts versus bilateral hydrosalpinx, can be further   evaluated with pelvic ultrasound.      GALINA PEMBERTON MD            SYSTEM ID:  [id number]         Report per radiology    Laboratory:  Labs Ordered and Resulted from Time of ED Arrival to Time of ED Departure   COMPREHENSIVE METABOLIC PANEL - Abnormal       Result Value    Sodium 137      Potassium 3.2 (*)     Chloride 103      Carbon Dioxide (CO2) 19 (*)     Anion Gap 15      Urea Nitrogen 10.9      Creatinine 0.63      Calcium 9.3      Glucose 175 (*)     Alkaline Phosphatase 55      AST 30      ALT 21      Protein Total 7.2      Albumin 4.4      Bilirubin Total 0.3      GFR Estimate >90     CBC WITH PLATELETS AND DIFFERENTIAL - Abnormal    WBC Count 8.4      RBC Count 3.52 (*)     Hemoglobin 12.6      Hematocrit 34.8 (*)     MCV 99      MCH 35.8 (*)     MCHC 36.2      RDW 11.5      Platelet Count 275      % Neutrophils 78      % Lymphocytes 16      % Monocytes 4      % Eosinophils 0      % Basophils 1      % Immature Granulocytes 1      NRBCs per 100 WBC 0      Absolute Neutrophils 6.6      Absolute Lymphocytes 1.3      Absolute Monocytes 0.4      Absolute Eosinophils 0.0      Absolute Basophils 0.0      Absolute Immature Granulocytes 0.1      Absolute NRBCs 0.0     LIPASE - Normal    Lipase 23     HCG QUALITATIVE PREGNANCY - Normal    hCG Serum Qualitative Negative     TROPONIN T, HIGH SENSITIVITY - Normal    Troponin T, High Sensitivity 6     ISTAT HCG QUALITATIVE PREGNANCY POCT - Normal    HCG Qualitative POCT Negative        Emergency Department Course & Assessments:         Interventions:  Medications   ondansetron (ZOFRAN) injection 4 mg (4 mg Intravenous $Given 6/2/23 1140)   alum & mag hydroxide-simethicone (MAALOX) suspension 15 mL (15 mLs Oral $Given 6/2/23 1146)   sucralfate (CARAFATE) suspension 1 g (1 g Oral $Given 6/2/23 1235)   HYDROmorphone (PF) (DILAUDID) injection 0.5 mg (0.5 mg Intravenous $Given 6/2/23  1310)   iopamidol (ISOVUE-370) solution 70 mL (70 mLs Intravenous $Given 6/2/23 1224)   sodium chloride (PF) 0.9% PF flush 58 mL (58 mLs Intravenous $Given 6/2/23 1226)   metoclopramide (REGLAN) injection 5 mg (5 mg Intravenous $Given 6/2/23 1308)   diphenhydrAMINE (BENADRYL) injection 25 mg (25 mg Intravenous $Given 6/2/23 1308)        Assessments:  1127 I obtained patient history and examined the patient as noted above.  1140 I rechecked the patient; the medications provided here in the ED are not helping.  1411 I rechecked the patient and discussed plan for discharge.    Independent Interpretation (X-rays, CTs, rhythm strip):  None    Consultations/Discussion of Management or Tests:  None     Social Determinants of Health affecting care:   None    Disposition:  The patient was discharged to home.     Impression & Plan      Medical Decision Making:  Humera Lovell is a 42 year old female with a history of epigastric abdominal pain who presents to the ED for evaluation of epigastric abdominal pain, nausea, and vomiting.  See HPI as above for additional details.  Vitals and physical exam as above.  Differential was broad and included pancreatitis, hepatitis, biliary pathology, gastritis, GERD, SBO, perforated viscus, atypical ACS, amongst others.  Work-up obtained as above.  Discussed with patient unclear etiology of her symptoms at this time.  No evidence for acute nefarious pathology identified based upon the above work-up.  Suspect intraluminal source of symptoms, such as GERD versus gastritis.  Patient currently taking omeprazole.  Advised her to continue to take this.  We will send her home with Carafate.  She has taken Zofran without significant improvement of symptoms, thus will send her home with Reglan, advise she take this with Benadryl.  Referral for GI placed again. Discussed reasons to return. All questions answered. Patient discharged to home in stable condition.    Diagnosis:    ICD-10-CM    1.  Epigastric pain  R10.13 Adult GI  Referral - Consult Only      2. Nausea and vomiting  R11.2 Adult GI  Referral - Consult Only         Discharge Medications:  Discharge Medication List as of 6/2/2023  2:23 PM      START taking these medications    Details   metoclopramide (REGLAN) 5 MG tablet Take 1 tablet (5 mg) by mouth 3 times daily as needed (nausea/vomiting), Disp-12 tablet, R-0, Local Print      !! sucralfate (CARAFATE) 1 GM/10ML suspension Take 10 mLs (1 g) by mouth 4 times daily as needed for nausea (epigastric abdominal pain), Disp-210 mL, R-0, Local Print       !! - Potential duplicate medications found. Please discuss with provider.         Scribe Disclosure:  I, [place], am serving as a scribe at 2:11 PM on 6/2/2023 to document services personally performed by Sheldon Joshua PA-C based on my observations and the provider's statements to me.     6/2/2023   Sheldon Joshua PA-C     This record was created at least in part using electronic voice recognition software, so please excuse any typographical errors.       Sheldon Joshua PA-C  06/02/23 [id number]

## 2023-06-05 LAB
ATRIAL RATE - MUSE: 57 BPM
DIASTOLIC BLOOD PRESSURE - MUSE: NORMAL MMHG
INTERPRETATION ECG - MUSE: NORMAL
P AXIS - MUSE: 68 DEGREES
PR INTERVAL - MUSE: 138 MS
QRS DURATION - MUSE: 88 MS
QT - MUSE: 492 MS
QTC - MUSE: 478 MS
R AXIS - MUSE: 66 DEGREES
SYSTOLIC BLOOD PRESSURE - MUSE: NORMAL MMHG
T AXIS - MUSE: 40 DEGREES
VENTRICULAR RATE- MUSE: 57 BPM

## 2023-06-17 ENCOUNTER — HOSPITAL ENCOUNTER (EMERGENCY)
Facility: CLINIC | Age: 43
Discharge: HOME | End: 2023-06-17
Attending: EMERGENCY MEDICINE | Admitting: EMERGENCY MEDICINE
Payer: COMMERCIAL

## 2023-06-17 ENCOUNTER — APPOINTMENT (OUTPATIENT)
Dept: GENERAL RADIOLOGY | Facility: CLINIC | Age: 43
End: 2023-06-17
Attending: EMERGENCY MEDICINE
Payer: COMMERCIAL

## 2023-06-17 VITALS
RESPIRATION RATE: 22 BRPM | SYSTOLIC BLOOD PRESSURE: 134 MMHG | DIASTOLIC BLOOD PRESSURE: 75 MMHG | WEIGHT: 140 LBS | BODY MASS INDEX: 21.93 KG/M2 | HEART RATE: 102 BPM | TEMPERATURE: 97.9 F | OXYGEN SATURATION: 100 %

## 2023-06-17 DIAGNOSIS — E87.6 HYPOKALEMIA: ICD-10-CM

## 2023-06-17 DIAGNOSIS — R11.2 NAUSEA AND VOMITING, UNSPECIFIED VOMITING TYPE: ICD-10-CM

## 2023-06-17 DIAGNOSIS — R07.9 CHEST PAIN, UNSPECIFIED TYPE: ICD-10-CM

## 2023-06-17 DIAGNOSIS — K20.90 ESOPHAGITIS: ICD-10-CM

## 2023-06-17 LAB
ALBUMIN SERPL BCG-MCNC: 4.7 G/DL (ref 3.5–5.2)
ALP SERPL-CCNC: 57 U/L (ref 35–104)
ALT SERPL W P-5'-P-CCNC: 17 U/L (ref 0–50)
ANION GAP SERPL CALCULATED.3IONS-SCNC: 20 MMOL/L (ref 7–15)
AST SERPL W P-5'-P-CCNC: 28 U/L (ref 0–45)
ATRIAL RATE - MUSE: 72 BPM
BASOPHILS # BLD AUTO: 0 10E3/UL (ref 0–0.2)
BASOPHILS NFR BLD AUTO: 1 %
BILIRUB SERPL-MCNC: 0.3 MG/DL
BUN SERPL-MCNC: 9.5 MG/DL (ref 6–20)
CALCIUM SERPL-MCNC: 10.1 MG/DL (ref 8.6–10)
CHLORIDE SERPL-SCNC: 100 MMOL/L (ref 98–107)
CREAT SERPL-MCNC: 0.75 MG/DL (ref 0.51–0.95)
DEPRECATED HCO3 PLAS-SCNC: 19 MMOL/L (ref 22–29)
DIASTOLIC BLOOD PRESSURE - MUSE: NORMAL MMHG
EOSINOPHIL # BLD AUTO: 0.1 10E3/UL (ref 0–0.7)
EOSINOPHIL NFR BLD AUTO: 1 %
ERYTHROCYTE [DISTWIDTH] IN BLOOD BY AUTOMATED COUNT: 11.9 % (ref 10–15)
GFR SERPL CREATININE-BSD FRML MDRD: >90 ML/MIN/1.73M2
GLUCOSE SERPL-MCNC: 131 MG/DL (ref 70–99)
HCG SERPL QL: NEGATIVE
HCT VFR BLD AUTO: 38.5 % (ref 35–47)
HGB BLD-MCNC: 13.7 G/DL (ref 11.7–15.7)
IMM GRANULOCYTES # BLD: 0 10E3/UL
IMM GRANULOCYTES NFR BLD: 0 %
INTERPRETATION ECG - MUSE: NORMAL
LIPASE SERPL-CCNC: 22 U/L (ref 13–60)
LYMPHOCYTES # BLD AUTO: 1.2 10E3/UL (ref 0.8–5.3)
LYMPHOCYTES NFR BLD AUTO: 17 %
MAGNESIUM SERPL-MCNC: 2 MG/DL (ref 1.7–2.3)
MCH RBC QN AUTO: 36 PG (ref 26.5–33)
MCHC RBC AUTO-ENTMCNC: 35.6 G/DL (ref 31.5–36.5)
MCV RBC AUTO: 101 FL (ref 78–100)
MONOCYTES # BLD AUTO: 0.6 10E3/UL (ref 0–1.3)
MONOCYTES NFR BLD AUTO: 8 %
NEUTROPHILS # BLD AUTO: 5.4 10E3/UL (ref 1.6–8.3)
NEUTROPHILS NFR BLD AUTO: 73 %
NRBC # BLD AUTO: 0 10E3/UL
NRBC BLD AUTO-RTO: 0 /100
P AXIS - MUSE: 80 DEGREES
PLATELET # BLD AUTO: 329 10E3/UL (ref 150–450)
POTASSIUM SERPL-SCNC: 3.1 MMOL/L (ref 3.4–5.3)
PR INTERVAL - MUSE: 148 MS
PROT SERPL-MCNC: 8.2 G/DL (ref 6.4–8.3)
QRS DURATION - MUSE: 88 MS
QT - MUSE: 452 MS
QTC - MUSE: 494 MS
R AXIS - MUSE: 77 DEGREES
RBC # BLD AUTO: 3.81 10E6/UL (ref 3.8–5.2)
SODIUM SERPL-SCNC: 139 MMOL/L (ref 136–145)
SYSTOLIC BLOOD PRESSURE - MUSE: NORMAL MMHG
T AXIS - MUSE: 62 DEGREES
TROPONIN T SERPL HS-MCNC: <6 NG/L
VENTRICULAR RATE- MUSE: 72 BPM
WBC # BLD AUTO: 7.4 10E3/UL (ref 4–11)

## 2023-06-17 PROCEDURE — 83735 ASSAY OF MAGNESIUM: CPT | Performed by: EMERGENCY MEDICINE

## 2023-06-17 PROCEDURE — 84484 ASSAY OF TROPONIN QUANT: CPT | Performed by: EMERGENCY MEDICINE

## 2023-06-17 PROCEDURE — 250N000013 HC RX MED GY IP 250 OP 250 PS 637: Performed by: EMERGENCY MEDICINE

## 2023-06-17 PROCEDURE — 83690 ASSAY OF LIPASE: CPT | Performed by: EMERGENCY MEDICINE

## 2023-06-17 PROCEDURE — 93005 ELECTROCARDIOGRAM TRACING: CPT

## 2023-06-17 PROCEDURE — 258N000003 HC RX IP 258 OP 636: Performed by: EMERGENCY MEDICINE

## 2023-06-17 PROCEDURE — 36415 COLL VENOUS BLD VENIPUNCTURE: CPT | Performed by: EMERGENCY MEDICINE

## 2023-06-17 PROCEDURE — 271N000002 HC RX 271: Performed by: EMERGENCY MEDICINE

## 2023-06-17 PROCEDURE — 84703 CHORIONIC GONADOTROPIN ASSAY: CPT | Performed by: EMERGENCY MEDICINE

## 2023-06-17 PROCEDURE — 96375 TX/PRO/DX INJ NEW DRUG ADDON: CPT

## 2023-06-17 PROCEDURE — 85025 COMPLETE CBC W/AUTO DIFF WBC: CPT | Performed by: EMERGENCY MEDICINE

## 2023-06-17 PROCEDURE — 99285 EMERGENCY DEPT VISIT HI MDM: CPT | Mod: 25

## 2023-06-17 PROCEDURE — 250N000009 HC RX 250: Performed by: EMERGENCY MEDICINE

## 2023-06-17 PROCEDURE — 80053 COMPREHEN METABOLIC PANEL: CPT | Performed by: EMERGENCY MEDICINE

## 2023-06-17 PROCEDURE — 96374 THER/PROPH/DIAG INJ IV PUSH: CPT

## 2023-06-17 PROCEDURE — 96361 HYDRATE IV INFUSION ADD-ON: CPT

## 2023-06-17 PROCEDURE — 71046 X-RAY EXAM CHEST 2 VIEWS: CPT

## 2023-06-17 PROCEDURE — 250N000011 HC RX IP 250 OP 636: Performed by: EMERGENCY MEDICINE

## 2023-06-17 PROCEDURE — C9113 INJ PANTOPRAZOLE SODIUM, VIA: HCPCS | Performed by: EMERGENCY MEDICINE

## 2023-06-17 RX ORDER — POTASSIUM CHLORIDE 1500 MG/1
20 TABLET, EXTENDED RELEASE ORAL 2 TIMES DAILY
Qty: 14 TABLET | Refills: 0 | Status: SHIPPED | OUTPATIENT
Start: 2023-06-17 | End: 2023-06-17

## 2023-06-17 RX ORDER — KETOROLAC TROMETHAMINE 15 MG/ML
15 INJECTION, SOLUTION INTRAMUSCULAR; INTRAVENOUS ONCE
Status: COMPLETED | OUTPATIENT
Start: 2023-06-17 | End: 2023-06-17

## 2023-06-17 RX ORDER — MAGNESIUM HYDROXIDE/ALUMINUM HYDROXICE/SIMETHICONE 120; 1200; 1200 MG/30ML; MG/30ML; MG/30ML
15 SUSPENSION ORAL ONCE
Status: COMPLETED | OUTPATIENT
Start: 2023-06-17 | End: 2023-06-17

## 2023-06-17 RX ORDER — PROCHLORPERAZINE MALEATE 10 MG
10 TABLET ORAL EVERY 8 HOURS PRN
Qty: 20 TABLET | Refills: 0 | Status: SHIPPED | OUTPATIENT
Start: 2023-06-17 | End: 2023-06-17

## 2023-06-17 RX ORDER — POTASSIUM CHLORIDE 1500 MG/1
40 TABLET, EXTENDED RELEASE ORAL ONCE
Status: COMPLETED | OUTPATIENT
Start: 2023-06-17 | End: 2023-06-17

## 2023-06-17 RX ORDER — HALOPERIDOL 5 MG/ML
2 INJECTION INTRAMUSCULAR ONCE
Status: COMPLETED | OUTPATIENT
Start: 2023-06-17 | End: 2023-06-17

## 2023-06-17 RX ORDER — PROCHLORPERAZINE MALEATE 10 MG
10 TABLET ORAL EVERY 8 HOURS PRN
Qty: 20 TABLET | Refills: 0 | Status: SHIPPED | OUTPATIENT
Start: 2023-06-17 | End: 2023-09-13

## 2023-06-17 RX ORDER — ONDANSETRON 2 MG/ML
4 INJECTION INTRAMUSCULAR; INTRAVENOUS EVERY 30 MIN PRN
Status: DISCONTINUED | OUTPATIENT
Start: 2023-06-17 | End: 2023-06-17 | Stop reason: HOSPADM

## 2023-06-17 RX ORDER — LIDOCAINE HYDROCHLORIDE 20 MG/ML
15 SOLUTION OROPHARYNGEAL ONCE
Status: COMPLETED | OUTPATIENT
Start: 2023-06-17 | End: 2023-06-17

## 2023-06-17 RX ORDER — POTASSIUM CHLORIDE 1500 MG/1
20 TABLET, EXTENDED RELEASE ORAL 2 TIMES DAILY
Qty: 14 TABLET | Refills: 0 | Status: SHIPPED | OUTPATIENT
Start: 2023-06-17 | End: 2023-09-13

## 2023-06-17 RX ORDER — ONDANSETRON 4 MG/1
4 TABLET, ORALLY DISINTEGRATING ORAL EVERY 6 HOURS PRN
Qty: 20 TABLET | Refills: 0 | Status: SHIPPED | OUTPATIENT
Start: 2023-06-17 | End: 2023-09-13

## 2023-06-17 RX ORDER — ONDANSETRON 4 MG/1
4 TABLET, ORALLY DISINTEGRATING ORAL EVERY 6 HOURS PRN
Qty: 20 TABLET | Refills: 0 | Status: SHIPPED | OUTPATIENT
Start: 2023-06-17 | End: 2023-06-17

## 2023-06-17 RX ORDER — PANTOPRAZOLE SODIUM 40 MG/1
40 TABLET, DELAYED RELEASE ORAL DAILY
Qty: 30 TABLET | Refills: 0 | Status: SHIPPED | OUTPATIENT
Start: 2023-06-17 | End: 2023-06-17

## 2023-06-17 RX ORDER — PANTOPRAZOLE SODIUM 40 MG/1
40 TABLET, DELAYED RELEASE ORAL DAILY
Qty: 30 TABLET | Refills: 0 | Status: SHIPPED | OUTPATIENT
Start: 2023-06-17 | End: 2023-09-13

## 2023-06-17 RX ORDER — DIPHENHYDRAMINE HYDROCHLORIDE 50 MG/ML
25 INJECTION INTRAMUSCULAR; INTRAVENOUS ONCE
Status: COMPLETED | OUTPATIENT
Start: 2023-06-17 | End: 2023-06-17

## 2023-06-17 RX ORDER — LORAZEPAM 2 MG/ML
0.5 INJECTION INTRAMUSCULAR ONCE
Status: COMPLETED | OUTPATIENT
Start: 2023-06-17 | End: 2023-06-17

## 2023-06-17 RX ADMIN — POTASSIUM CHLORIDE 40 MEQ: 1500 TABLET, EXTENDED RELEASE ORAL at 16:04

## 2023-06-17 RX ADMIN — PROCHLORPERAZINE EDISYLATE 10 MG: 5 INJECTION INTRAMUSCULAR; INTRAVENOUS at 16:37

## 2023-06-17 RX ADMIN — ALUMINUM HYDROXIDE, MAGNESIUM HYDROXIDE, AND SIMETHICONE 15 ML: 200; 200; 20 SUSPENSION ORAL at 16:04

## 2023-06-17 RX ADMIN — HALOPERIDOL LACTATE 2 MG: 5 INJECTION, SOLUTION INTRAMUSCULAR at 15:09

## 2023-06-17 RX ADMIN — PANTOPRAZOLE SODIUM 80 MG: 40 INJECTION, POWDER, FOR SOLUTION INTRAVENOUS at 15:10

## 2023-06-17 RX ADMIN — KETOROLAC TROMETHAMINE 15 MG: 15 INJECTION, SOLUTION INTRAMUSCULAR; INTRAVENOUS at 16:27

## 2023-06-17 RX ADMIN — SODIUM CHLORIDE 1000 ML: 9 INJECTION, SOLUTION INTRAVENOUS at 15:10

## 2023-06-17 RX ADMIN — LIDOCAINE HYDROCHLORIDE 15 ML: 1 POWDER at 16:04

## 2023-06-17 RX ADMIN — ONDANSETRON 4 MG: 2 INJECTION INTRAMUSCULAR; INTRAVENOUS at 15:07

## 2023-06-17 RX ADMIN — LIDOCAINE HYDROCHLORIDE 15 ML: 1 POWDER at 16:27

## 2023-06-17 RX ADMIN — LORAZEPAM 0.5 MG: 2 INJECTION INTRAMUSCULAR; INTRAVENOUS at 15:08

## 2023-06-17 RX ADMIN — ALUMINUM HYDROXIDE, MAGNESIUM HYDROXIDE, AND SIMETHICONE 15 ML: 200; 200; 20 SUSPENSION ORAL at 16:27

## 2023-06-17 RX ADMIN — DIPHENHYDRAMINE HYDROCHLORIDE 25 MG: 50 INJECTION, SOLUTION INTRAMUSCULAR; INTRAVENOUS at 16:36

## 2023-06-17 ASSESSMENT — ACTIVITIES OF DAILY LIVING (ADL)
ADLS_ACUITY_SCORE: 33
ADLS_ACUITY_SCORE: 35

## 2023-06-17 NOTE — ED NOTES
"Pt continues to have vomiting, unable to stay still and rocking back and forth. RN giving pt her medications now that IV was established and RN explained to pt the medications she is getting and she states \"I need the pain med that starts with d. That's what I got at [facility] and it helped.\"  "

## 2023-06-17 NOTE — ED NOTES
"RN administered ordered medications. Pt states still wanting dilaudid and RN explained to pt she will not be getting that per Dr. Gordon and explained to pt she got haldol and ativan. Pt states \"I just wish I would have gotten dilaudid instead because it worked better.\" Pt appears more comfortable in the room than at time of arrival   "

## 2023-06-17 NOTE — ED PROVIDER NOTES
History     Chief Complaint:  Abdominal Pain and Nausea & Vomiting       The history is provided by the patient and a relative.      Humera Lovell is a 42 year old female who presents with chest pain, nausea and vomiting. Patient reports she started vomiting 2 hours ago while driving. She reports chest pain because of the vomiting. She denies any blood within her vomit. Patient reports she was in the ER on 6/2/23 for the same symptoms. Patient notes her last use of marijuana was two days ago. She notes use of alcohol as well. Patient reports history of gallbladder removed and denies history of heart problems.     Independent Historian:   Sibling - They report above history.     Review of External Notes:   See MDM       Medications:    Coreg   Atarax  Reglan  Compazine   Ambien     Past Medical History:    Anxiety   Insomnia     Past Surgical History:    Gallbladder surgery   Orthopedic surgery        Physical Exam     Patient Vitals for the past 24 hrs:   BP Temp Temp src Pulse Resp SpO2 Weight   06/17/23 1607 -- -- -- -- -- 100 % --   06/17/23 1512 134/75 -- -- 102 -- -- --   06/17/23 1347 (!) 140/80 97.9  F (36.6  C) Temporal 79 22 98 % 63.5 kg (140 lb)        Physical Exam    Constitutional: Well developed, uncomfortable, actively vomiting, nontox appearance  Head: Atraumatic.   Mouth/Throat: Oropharynx is clear and moist.   Neck:  no stridor  Eyes: no scleral icterus  Cardiovascular: RRR, 2+ bilat radial pulses  Pulmonary/Chest: nml resp effort, Clear BS bilat, chest nontender  Abdominal: ND, soft, NT, no rebound or guarding   Ext: Warm, well perfused, no edema  Neurological: A&O, symmetric facies, moves ext x4  Skin: Skin is warm and diaphoretic  Psychiatric: Behavior is normal. Thought content normal.   Nursing note and vitals reviewed.    Emergency Department Course   ECG    ECG taken at 1529, ECG read at 1533  Normal sinus rhythm  Right atrial enlargement  Prolonged QT  Abnormal ECG   No changes as compared  to prior, dated 6/2/23.  Rate 72 bpm. ME interval 148 ms. QRS duration 88 ms. QT/QTc 452/494 ms. P-R-T axes 80 77 62.       Imaging:  XR Chest 2 Views   Final Result   IMPRESSION: Negative chest.         Report per radiology    Laboratory:  Labs Ordered and Resulted from Time of ED Arrival to Time of ED Departure   COMPREHENSIVE METABOLIC PANEL - Abnormal       Result Value    Sodium 139      Potassium 3.1 (*)     Chloride 100      Carbon Dioxide (CO2) 19 (*)     Anion Gap 20 (*)     Urea Nitrogen 9.5      Creatinine 0.75      Calcium 10.1 (*)     Glucose 131 (*)     Alkaline Phosphatase 57      AST 28      ALT 17      Protein Total 8.2      Albumin 4.7      Bilirubin Total 0.3      GFR Estimate >90     CBC WITH PLATELETS AND DIFFERENTIAL - Abnormal    WBC Count 7.4      RBC Count 3.81      Hemoglobin 13.7      Hematocrit 38.5       (*)     MCH 36.0 (*)     MCHC 35.6      RDW 11.9      Platelet Count 329      % Neutrophils 73      % Lymphocytes 17      % Monocytes 8      % Eosinophils 1      % Basophils 1      % Immature Granulocytes 0      NRBCs per 100 WBC 0      Absolute Neutrophils 5.4      Absolute Lymphocytes 1.2      Absolute Monocytes 0.6      Absolute Eosinophils 0.1      Absolute Basophils 0.0      Absolute Immature Granulocytes 0.0      Absolute NRBCs 0.0     LIPASE - Normal    Lipase 22     HCG QUALITATIVE PREGNANCY - Normal    hCG Serum Qualitative Negative     TROPONIN T, HIGH SENSITIVITY - Normal    Troponin T, High Sensitivity <6     MAGNESIUM - Normal    Magnesium 2.0          Emergency Department Course & Assessments:         Interventions:  Medications   ondansetron (ZOFRAN) injection 4 mg (4 mg Intravenous $Given 6/17/23 1507)   0.9% sodium chloride BOLUS (0 mLs Intravenous Stopped 6/17/23 1608)   haloperidol lactate (HALDOL) injection 2 mg (2 mg Intravenous $Given 6/17/23 1509)   LORazepam (ATIVAN) injection 0.5 mg (0.5 mg Intravenous $Given 6/17/23 1508)   pantoprazole (PROTONIX) IV push  injection 80 mg (80 mg Intravenous $Given 23 1510)   lidocaine (viscous) (XYLOCAINE) 2 % solution 15 mL (15 mLs Mouth/Throat $Given 23 1604)   alum & mag hydroxide-simethicone (MAALOX) suspension 15 mL (15 mLs Oral $Given 23 1604)   potassium chloride ER (KLOR-CON M) CR tablet 40 mEq (40 mEq Oral $Given 23 1604)   ketorolac (TORADOL) injection 15 mg (15 mg Intravenous $Given 23 1627)   alum & mag hydroxide-simethicone (MAALOX) suspension 15 mL (15 mLs Oral $Given 23 1627)   lidocaine (viscous) (XYLOCAINE) 2 % solution 15 mL (15 mLs Mouth/Throat $Given 23 1627)   prochlorperazine (COMPAZINE) injection 10 mg (10 mg Intravenous $Given 23 1637)   diphenhydrAMINE (BENADRYL) injection 25 mg (25 mg Intravenous $Given 23 1636)        Assessments:  1445 I obtained history and examined the patient as noted above.     Independent Interpretation (X-rays, CTs, rhythm strip):  See MDM     Consultations/Discussion of Management or Tests:  See MDM         Social Determinants of Health affecting care:   See MDM    Disposition:  The patient was discharged to home.     Impression & Plan      Medical Decision Makin year old female presenting w/ chest pain, vomiting     Social determinants affecting patient's health include:  Patient endorses intermittent tobacco, alcohol and marijuana use potentially increasing risk for presentation to the emergency department     I reviewed medical records from GI clinic on 2022 for an overview of the patient's nausea and vomiting     DDx includes gastritis, vomiting, electrolyte abnormality, dehydration, hepatitis, pancreatitis, cannabinoid hyperemesis, atypical ACS although less likely given risk factors.  EKG significant for normal sinus rhythm without acute ischemic findings on my independent interpretation.  Doubt surgical etiology such as small bowel obstruction given history and physical exam.  Labs significant for mild hypokalemia  otherwise unremarkable including undetectable troponin level.  Imaging sig for no pneumothoraces on my independent interpretation, radiology read as noted above.  Interventions as noted above with improvement in symptoms.  Presentation seems most consistent with gastritis.  At this time I feel the pt is safe for discharge.  Recommendations given regarding follow up with PCP and return to the emergency department as needed for new or worsening symptoms.  Patient counseled on all results, disposition and diagnosis.  They are understanding and agreeable to plan. Patient discharged in stable condition.         Diagnosis:    ICD-10-CM    1. Nausea and vomiting, unspecified vomiting type  R11.2       2. Chest pain, unspecified type  R07.9       3. Esophagitis  K20.90       4. Hypokalemia  E87.6            Discharge Medications:  Current Discharge Medication List      START taking these medications    Details   ondansetron (ZOFRAN ODT) 4 MG ODT tab Take 1 tablet (4 mg) by mouth every 6 hours as needed for nausea or vomiting  Qty: 20 tablet, Refills: 0      pantoprazole (PROTONIX) 40 MG EC tablet Take 1 tablet (40 mg) by mouth daily  Qty: 30 tablet, Refills: 0      potassium chloride ER (K-TAB) 20 MEQ CR tablet Take 1 tablet (20 mEq) by mouth 2 times daily  Qty: 14 tablet, Refills: 0                Scribe Disclosure:  I, PRINCESS OKSANA, am serving as a scribe at 2:21 PM on 6/17/2023 to document services personally performed by Thad Gordon MD based on my observations and the provider's statements to me.     6/17/2023   Thad Gordon MD Vaughn, Christopher E, MD  06/17/23 1700

## 2023-06-17 NOTE — ED TRIAGE NOTES
Patient presents with complaints of epigastric pain, nausea and vomiting that started about an hour prior to arrival.      Triage Assessment     Row Name 06/17/23 1344       Triage Assessment (Adult)    Airway WDL WDL       Respiratory WDL    Respiratory WDL WDL       Skin Circulation/Temperature WDL    Skin Circulation/Temperature WDL WDL       Cardiac WDL    Cardiac WDL X;chest pain       Chest Pain Assessment    Chest Pain Location epigastric

## 2023-06-18 ENCOUNTER — HOSPITAL ENCOUNTER (EMERGENCY)
Facility: CLINIC | Age: 43
Discharge: HOME | End: 2023-06-18
Admitting: EMERGENCY MEDICINE
Payer: COMMERCIAL

## 2023-06-18 VITALS
OXYGEN SATURATION: 100 % | SYSTOLIC BLOOD PRESSURE: 147 MMHG | HEIGHT: 67 IN | DIASTOLIC BLOOD PRESSURE: 103 MMHG | BODY MASS INDEX: 21.45 KG/M2 | RESPIRATION RATE: 18 BRPM | WEIGHT: 136.69 LBS | TEMPERATURE: 98.8 F | HEART RATE: 77 BPM

## 2023-06-18 PROCEDURE — 93005 ELECTROCARDIOGRAM TRACING: CPT

## 2023-06-18 PROCEDURE — 99281 EMR DPT VST MAYX REQ PHY/QHP: CPT

## 2023-06-18 ASSESSMENT — ACTIVITIES OF DAILY LIVING (ADL): ADLS_ACUITY_SCORE: 33

## 2023-06-18 NOTE — ED TRIAGE NOTES
Pt arrives to the ED due to having midsternal chest pain that began yesterday along with vomiting. Pt states she was seen at [facility] yesterday and had full work-up. States she was still having these symptoms when she left yesterday. Pt also prerna 3 weeks ago for similar. States also feeling vision is blurry.

## 2023-06-19 ENCOUNTER — TELEPHONE (OUTPATIENT)
Dept: INTERNAL MEDICINE | Facility: CLINIC | Age: 43
End: 2023-06-19

## 2023-06-19 ENCOUNTER — HOSPITAL ENCOUNTER (EMERGENCY)
Facility: CLINIC | Age: 43
Discharge: HOME | End: 2023-06-19
Attending: EMERGENCY MEDICINE | Admitting: EMERGENCY MEDICINE
Payer: COMMERCIAL

## 2023-06-19 ENCOUNTER — NURSE TRIAGE (OUTPATIENT)
Dept: NURSING | Facility: CLINIC | Age: 43
End: 2023-06-19

## 2023-06-19 VITALS
TEMPERATURE: 99 F | SYSTOLIC BLOOD PRESSURE: 132 MMHG | BODY MASS INDEX: 21.2 KG/M2 | WEIGHT: 135.36 LBS | DIASTOLIC BLOOD PRESSURE: 92 MMHG | HEART RATE: 85 BPM | RESPIRATION RATE: 18 BRPM | OXYGEN SATURATION: 99 %

## 2023-06-19 DIAGNOSIS — R11.2 NAUSEA AND VOMITING, UNSPECIFIED VOMITING TYPE: ICD-10-CM

## 2023-06-19 DIAGNOSIS — R10.13 EPIGASTRIC PAIN: ICD-10-CM

## 2023-06-19 DIAGNOSIS — R07.89 ATYPICAL CHEST PAIN: ICD-10-CM

## 2023-06-19 LAB
ALBUMIN SERPL BCG-MCNC: 5.2 G/DL (ref 3.5–5.2)
ALP SERPL-CCNC: 62 U/L (ref 35–104)
ALT SERPL W P-5'-P-CCNC: 22 U/L (ref 0–50)
ANION GAP SERPL CALCULATED.3IONS-SCNC: 19 MMOL/L (ref 7–15)
AST SERPL W P-5'-P-CCNC: 38 U/L (ref 0–45)
ATRIAL RATE - MUSE: 67 BPM
ATRIAL RATE - MUSE: 76 BPM
ATRIAL RATE - MUSE: 84 BPM
BASOPHILS # BLD AUTO: 0 10E3/UL (ref 0–0.2)
BASOPHILS NFR BLD AUTO: 0 %
BILIRUB SERPL-MCNC: 0.6 MG/DL
BUN SERPL-MCNC: 14 MG/DL (ref 6–20)
CALCIUM SERPL-MCNC: 9.9 MG/DL (ref 8.6–10)
CHLORIDE SERPL-SCNC: 93 MMOL/L (ref 98–107)
CREAT SERPL-MCNC: 0.74 MG/DL (ref 0.51–0.95)
DEPRECATED HCO3 PLAS-SCNC: 23 MMOL/L (ref 22–29)
DIASTOLIC BLOOD PRESSURE - MUSE: NORMAL MMHG
EOSINOPHIL # BLD AUTO: 0 10E3/UL (ref 0–0.7)
EOSINOPHIL NFR BLD AUTO: 0 %
ERYTHROCYTE [DISTWIDTH] IN BLOOD BY AUTOMATED COUNT: 11.8 % (ref 10–15)
GFR SERPL CREATININE-BSD FRML MDRD: >90 ML/MIN/1.73M2
GLUCOSE SERPL-MCNC: 150 MG/DL (ref 70–99)
HCG SERPL QL: NEGATIVE
HCT VFR BLD AUTO: 41.1 % (ref 35–47)
HGB BLD-MCNC: 14.4 G/DL (ref 11.7–15.7)
HOLD SPECIMEN: NORMAL
IMM GRANULOCYTES # BLD: 0 10E3/UL
IMM GRANULOCYTES NFR BLD: 0 %
INTERPRETATION ECG - MUSE: NORMAL
LIPASE SERPL-CCNC: 18 U/L (ref 13–60)
LYMPHOCYTES # BLD AUTO: 0.5 10E3/UL (ref 0.8–5.3)
LYMPHOCYTES NFR BLD AUTO: 7 %
MCH RBC QN AUTO: 35.2 PG (ref 26.5–33)
MCHC RBC AUTO-ENTMCNC: 35 G/DL (ref 31.5–36.5)
MCV RBC AUTO: 101 FL (ref 78–100)
MONOCYTES # BLD AUTO: 0.7 10E3/UL (ref 0–1.3)
MONOCYTES NFR BLD AUTO: 9 %
NEUTROPHILS # BLD AUTO: 6.4 10E3/UL (ref 1.6–8.3)
NEUTROPHILS NFR BLD AUTO: 84 %
NRBC # BLD AUTO: 0 10E3/UL
NRBC BLD AUTO-RTO: 0 /100
P AXIS - MUSE: -24 DEGREES
P AXIS - MUSE: 56 DEGREES
P AXIS - MUSE: NORMAL DEGREES
PLATELET # BLD AUTO: 352 10E3/UL (ref 150–450)
POTASSIUM SERPL-SCNC: 2.9 MMOL/L (ref 3.4–5.3)
PR INTERVAL - MUSE: 100 MS
PR INTERVAL - MUSE: 110 MS
PR INTERVAL - MUSE: 272 MS
PROT SERPL-MCNC: 9 G/DL (ref 6.4–8.3)
QRS DURATION - MUSE: 78 MS
QRS DURATION - MUSE: 80 MS
QRS DURATION - MUSE: 90 MS
QT - MUSE: 412 MS
QT - MUSE: 426 MS
QT - MUSE: 442 MS
QTC - MUSE: 467 MS
QTC - MUSE: 479 MS
QTC - MUSE: 486 MS
R AXIS - MUSE: 69 DEGREES
R AXIS - MUSE: 72 DEGREES
R AXIS - MUSE: 74 DEGREES
RBC # BLD AUTO: 4.09 10E6/UL (ref 3.8–5.2)
SODIUM SERPL-SCNC: 135 MMOL/L (ref 136–145)
SYSTOLIC BLOOD PRESSURE - MUSE: NORMAL MMHG
T AXIS - MUSE: 53 DEGREES
T AXIS - MUSE: 54 DEGREES
T AXIS - MUSE: 61 DEGREES
TROPONIN T SERPL HS-MCNC: 24 NG/L
TROPONIN T SERPL HS-MCNC: 32 NG/L
VENTRICULAR RATE- MUSE: 67 BPM
VENTRICULAR RATE- MUSE: 76 BPM
VENTRICULAR RATE- MUSE: 84 BPM
WBC # BLD AUTO: 7.7 10E3/UL (ref 4–11)

## 2023-06-19 PROCEDURE — 96374 THER/PROPH/DIAG INJ IV PUSH: CPT

## 2023-06-19 PROCEDURE — 250N000011 HC RX IP 250 OP 636: Performed by: EMERGENCY MEDICINE

## 2023-06-19 PROCEDURE — 36415 COLL VENOUS BLD VENIPUNCTURE: CPT | Performed by: EMERGENCY MEDICINE

## 2023-06-19 PROCEDURE — 84484 ASSAY OF TROPONIN QUANT: CPT | Performed by: EMERGENCY MEDICINE

## 2023-06-19 PROCEDURE — 96375 TX/PRO/DX INJ NEW DRUG ADDON: CPT

## 2023-06-19 PROCEDURE — 99207 REFERRAL TO ACUTE AND DIAGNOSTIC SERVICES: CPT | Performed by: INTERNAL MEDICINE

## 2023-06-19 PROCEDURE — 99284 EMERGENCY DEPT VISIT MOD MDM: CPT | Mod: 25

## 2023-06-19 PROCEDURE — 93005 ELECTROCARDIOGRAM TRACING: CPT

## 2023-06-19 PROCEDURE — 83690 ASSAY OF LIPASE: CPT | Performed by: EMERGENCY MEDICINE

## 2023-06-19 PROCEDURE — 84703 CHORIONIC GONADOTROPIN ASSAY: CPT | Performed by: EMERGENCY MEDICINE

## 2023-06-19 PROCEDURE — 96372 THER/PROPH/DIAG INJ SC/IM: CPT | Performed by: EMERGENCY MEDICINE

## 2023-06-19 PROCEDURE — 85025 COMPLETE CBC W/AUTO DIFF WBC: CPT | Performed by: EMERGENCY MEDICINE

## 2023-06-19 PROCEDURE — 250N000013 HC RX MED GY IP 250 OP 250 PS 637: Performed by: EMERGENCY MEDICINE

## 2023-06-19 PROCEDURE — 80053 COMPREHEN METABOLIC PANEL: CPT | Performed by: EMERGENCY MEDICINE

## 2023-06-19 PROCEDURE — 93005 ELECTROCARDIOGRAM TRACING: CPT | Mod: 76

## 2023-06-19 RX ORDER — POTASSIUM CHLORIDE 1.5 G/1.58G
40 POWDER, FOR SOLUTION ORAL ONCE
Status: COMPLETED | OUTPATIENT
Start: 2023-06-19 | End: 2023-06-19

## 2023-06-19 RX ORDER — ONDANSETRON 4 MG/1
4 TABLET, ORALLY DISINTEGRATING ORAL EVERY 8 HOURS PRN
Qty: 10 TABLET | Refills: 0 | Status: SHIPPED | OUTPATIENT
Start: 2023-06-19 | End: 2023-09-13

## 2023-06-19 RX ORDER — ASPIRIN 325 MG
325 TABLET, DELAYED RELEASE (ENTERIC COATED) ORAL ONCE
Status: COMPLETED | OUTPATIENT
Start: 2023-06-19 | End: 2023-06-19

## 2023-06-19 RX ORDER — HALOPERIDOL 5 MG/ML
2 INJECTION INTRAMUSCULAR ONCE
Status: COMPLETED | OUTPATIENT
Start: 2023-06-19 | End: 2023-06-19

## 2023-06-19 RX ORDER — ONDANSETRON 2 MG/ML
4 INJECTION INTRAMUSCULAR; INTRAVENOUS EVERY 30 MIN PRN
Status: DISCONTINUED | OUTPATIENT
Start: 2023-06-19 | End: 2023-06-19 | Stop reason: HOSPADM

## 2023-06-19 RX ADMIN — POTASSIUM CHLORIDE FOR ORAL SOLUTION 40 MEQ: 1.5 POWDER, FOR SOLUTION ORAL at 04:33

## 2023-06-19 RX ADMIN — FAMOTIDINE 20 MG: 10 INJECTION, SOLUTION INTRAVENOUS at 02:46

## 2023-06-19 RX ADMIN — HALOPERIDOL LACTATE 2 MG: 5 INJECTION, SOLUTION INTRAMUSCULAR at 02:50

## 2023-06-19 RX ADMIN — ASPIRIN 325 MG: 325 TABLET ORAL at 06:28

## 2023-06-19 RX ADMIN — ONDANSETRON 4 MG: 2 INJECTION INTRAMUSCULAR; INTRAVENOUS at 02:46

## 2023-06-19 ASSESSMENT — ACTIVITIES OF DAILY LIVING (ADL)
ADLS_ACUITY_SCORE: 35

## 2023-06-19 NOTE — DISCHARGE INSTRUCTIONS
Discharge Instructions  Chest Pain    You have been seen today for chest pain or discomfort.  At this time, your provider has found no signs that your chest pain is due to a serious or life-threatening condition, (or you have declined more testing and/or admission to the hospital). However, sometimes there is a serious problem that does not show up right away. Your evaluation today may not be complete and you may need further testing and evaluation.     Generally, every Emergency Department visit should have a follow-up clinic visit with either a primary or a specialty clinic/provider. Please follow-up as instructed by your emergency provider today.  Return to the Emergency Department if:  Your chest pain changes, gets worse, starts to happen more often, or comes with less activity.  You are newly short of breath.  You get very weak or tired.  You pass out or faint.  You have any new symptoms, like fever, cough, numb legs, or you cough up blood.  You have anything else that worries you.    Until you follow-up with your regular provider, please do the following:  Take one aspirin daily unless you have an allergy or are told not to by your provider.  If a stress test appointment has been made, go to the appointment.  If you have questions, contact your regular provider.  Follow-up with your regular provider/clinic as directed; this is very important.    If you were given a prescription for medicine here today, be sure to read all of the information (including the package insert) that comes with your prescription.  This will include important information about the medicine, its side effects, and any warnings that you need to know about.  The pharmacist who fills the prescription can provide more information and answer questions you may have about the medicine.  If you have questions or concerns that the pharmacist cannot address, please call or return to the Emergency Department.       Remember that you can always come  back to the Emergency Department if you are not able to see your regular provider in the amount of time listed above, if you get any new symptoms, or if there is anything that worries you.  Discharge Instructions  Vomiting    You have been seen today for vomiting (throwing up). This is usually caused by a virus, but some bacteria, parasites, medicines or other medical conditions can cause similar symptoms. At this time your provider does not find that your vomiting is a sign of anything dangerous or life-threatening. However, sometimes the signs of serious illness do not show up right away. If you have new or worse symptoms, you may need to be seen again in the Emergency Department or by your primary provider. Remember that serious problems like appendicitis can start as vomiting.    Generally, every Emergency Department visit should have a follow-up clinic visit with either a primary or a specialty clinic/provider. Please follow-up as instructed by your emergency provider today.    Return to the Emergency Department if:  You keep vomiting and you are not able to keep liquids down.   You feel you are getting dehydrated, such as being very thirsty, not urinating (peeing) at least every 8-12 hours, or feeling faint or lightheaded.   You develop a new fever, or your fever continues for more than 2 days.   You have abdominal (belly pain) that seems worse than cramps, is in one spot, or is getting worse over time. Appendicitis usually causes pain in the right lower abdomen (to the right and below your belly button) so watch for pain in this location.  You have blood in your vomit or stools.   You feel very weak.  You are not starting to improve within 24 hours of your visit here.     What can I do to help myself?  The most important thing to do is to drink clear liquids. If you have been vomiting a lot, it is best to have only small, frequent sips of liquids. Drinking too much at once may cause more vomiting. If you are  vomiting often, you must replace minerals, sodium and potassium lost with your illness. Pedialyte  is the best available rehydration liquid but some find that it doesn t taste good so sports drinks are an alterative. You can also drink clear liquids such as water, weak tea, apple juice, and 7-Up . Avoid acid liquids (orange), caffeine (coffee) or alcohol. Do not drink milk until you no longer have diarrhea (loose stools).   After liquids are staying down, you may start eating mild foods. Soda crackers, toast, plain noodles, gelatin, applesauce and bananas are good first choices. Avoid foods that have acid, are spicy, fatty or have a lot of fiber (such as meats, coarse grains, vegetables). You may start eating these foods again in about 3 days when you are better.   Sometimes treatment includes prescription medicine to prevent nausea (sick to your stomach) and vomiting. If your provider prescribes these for you, take them as directed.   Do not take ibuprofen, naproxen, or other nonsteroidal anti-inflammatory (NSAID) medicines without checking with your healthcare provider.     If you were given a prescription for medicine here today, be sure to read all of the information (including the package insert) that comes with your prescription.  This will include important information about the medicine, its side effects, and any warnings that you need to know about.  The pharmacist who fills the prescription can provide more information and answer questions you may have about the medicine.  If you have questions or concerns that the pharmacist cannot address, please call or return to the Emergency Department.     Remember that you can always come back to the Emergency Department if you are not able to see your regular provider in the amount of time listed above, if you get any new symptoms, or if there is anything that worries you.  Discharge Instructions  Abdominal Pain    Abdominal pain (belly pain) can be caused by many  things. Your evaluation today does not show the exact cause for your pain. Your provider today has decided that it is unlikely your pain is due to a life threatening problem, or a problem requiring surgery or hospital admission. Sometimes those problems cannot be found right away, so it is very important that you follow up as directed.  Sometimes only the changes which occur over time allow the cause of your pain to be found.    Generally, every Emergency Department visit should have a follow-up clinic visit with either a primary or a specialty clinic/provider. Please follow-up as instructed by your emergency provider today. With abdominal pain, we often recommend very close follow-up, such as the following day.    ADULTS:  Return to the Emergency Department right away if:    You get an oral temperature above 102oF or as directed by your provider.  You have blood in your stools. This may be bright red or appear as black, tarry stools.    You keep vomiting (throwing up) or cannot drink liquids.  You see blood when you vomit.   You cannot have a bowel movement or you cannot pass gas.  Your stomach gets bloated or bigger.  Your skin or the whites of your eyes look yellow.  You faint.  You have bloody, frequent or painful urination (peeing).  You have new symptoms or anything that worries you.    CHILDREN:  Return to the Emergency Department right away if your child has any of the above-listed symptoms or the following:    Pushes your hand away or screams/cries when his/her belly is touched.  You notice your child is very fussy or weak.  Your child is very tired and is too tired to eat or drink.  Your child is dehydrated.  Signs of dehydration can be:  Significant change in the amount of wet diapers/urine.  Your infant or child starts to have dry mouth and lips, or no saliva (spit) or tears.    PREGNANT WOMEN:  Return to the Emergency Department right away if you have any of the above-listed symptoms or the  following:    You have bleeding, leaking fluid or passing tissue from the vagina.  You have worse pain or cramping, or pain in your shoulder or back.  You have vomiting that will not stop.  You have a temperature of 100oF or more.  Your baby is not moving as much as usual.  You faint.  You get a bad headache with or without eye problems and abdominal pain.  You have a seizure.  You have unusual discharge from your vagina and abdominal pain.    Abdominal pain is pretty common during pregnancy.  Your pain may or may not be related to your pregnancy. You should follow-up closely with your OB provider so they can evaluate you and your baby.  Until you follow-up with your regular provider, do the following:     Avoid sex and do not put anything in your vagina.  Drink clear fluids.  Only take medications approved by your provider.    MORE INFORMATION:    Appendicitis:  A possible cause of abdominal pain in any person who still has their appendix is acute appendicitis. Appendicitis is often hard to diagnose.  Testing does not always rule out early appendicitis or other causes of abdominal pain. Close follow-up with your provider and re-evaluations may be needed to figure out the reason for your abdominal pain.    Follow-up:  It is very important that you make an appointment with your clinic and go to the appointment.  If you do not follow-up with your primary provider, it may result in missing an important development which could result in permanent injury or disability and/or lasting pain.  If there is any problem keeping your appointment, call your provider or return to the Emergency Department.    Medications:  Take your medications as directed by your provider today.  Before using over-the-counter medications, ask your provider and make sure to take the medications as directed.  If you have any questions about medications, ask your provider.    Diet:  Resume your normal diet as much as possible, but do not eat fried,  "fatty or spicy foods while you have pain.  Do not drink alcohol or have caffeine.  Do not smoke tobacco.    Probiotics: If you have been given an antibiotic, you may want to also take a probiotic pill or eat yogurt with live cultures. Probiotics have \"good bacteria\" to help your intestines stay healthy. Studies have shown that probiotics help prevent diarrhea (loose stools) and other intestine problems (including C. diff infection) when you take antibiotics. You can buy these without a prescription in the pharmacy section of the store.     If you were given a prescription for medicine here today, be sure to read all of the information (including the package insert) that comes with your prescription.  This will include important information about the medicine, its side effects, and any warnings that you need to know about.  The pharmacist who fills the prescription can provide more information and answer questions you may have about the medicine.  If you have questions or concerns that the pharmacist cannot address, please call or return to the Emergency Department.       Remember that you can always come back to the Emergency Department if you are not able to see your regular provider in the amount of time listed above, if you get any new symptoms, or if there is anything that worries you.   "

## 2023-06-19 NOTE — ED PROVIDER NOTES
History     Chief Complaint:  Nausea & Vomiting       The history is provided by the patient.      Humera Lovell is a 42 year old female who presents with nausea and vomiting that began yesterday. She reports a history of similar symptoms and notes that she has been evaluated multiple due to episodes of nausea and vomiting. She has an appointment scheduled with her primary care provider on the 28th and a colonoscopy scheduled in August. She reports marijuana use but notes that she has decreased her intake. The patient denies diarrhea, constipation, or dysuria.     CT chest/abdomen/pelvis - 6/2/23  IMPRESSION:    1. No evidence of acute pathology in the abdomen or pelvis.   2. Bilateral adnexal cystic areas, indeterminate, could represent   bilateral ovarian cysts versus bilateral hydrosalpinx, can be further   evaluated with pelvic ultrasound.   6/17-chest x-ray- IMPRESSION: Negative chest.  Independent Historian:    No independent historian           Medications:    Coreg   Atarax   Reglan   Protonix   Compazine   Phenergan   Carafate   Ambien     Past Medical History:    Anxiety   Depression   insomia   NSTEMI     Past Surgical History:    Angiogram   Gallbladder surgery   Orthopedic surgery      Physical Exam     Patient Vitals for the past 24 hrs:   BP Temp Temp src Pulse Resp SpO2 Weight   06/19/23 0539 (!) 132/92 -- -- 86 -- 99 % --   06/19/23 0159 (!) 143/119 99  F (37.2  C) Temporal 100 18 100 % 61.4 kg (135 lb 5.8 oz)        Physical Exam  Constitutional:  Oriented to person, place, and time. Minor distress. Dry heaving   HENT:   Head:    Normocephalic.   Mouth/Throat:   Oropharynx is clear and moist.   Eyes:    EOM are normal. Pupils are equal, round, and reactive to light.   Neck:    Neck supple.   Cardiovascular:  Normal rate, regular rhythm and normal heart sounds.      Exam reveals no gallop and no friction rub.       No murmur heard.  Pulmonary/Chest:  Effort normal and breath sounds normal.       No respiratory distress. No wheezes. No rales.      No reproducible chest wall pain.  Abdominal:   Soft. No distension. No tenderness. No rebound and no guarding. Negative Gonzalez's sign.   Musculoskeletal:  Normal range of motion.   Neurological:   Alert and oriented to person, place, and time.           Moves all 4 extremities spontaneously    Skin:    No rash noted. No pallor.      Emergency Department Course   ECG  ECG taken at 0240, ECG read at 0252  Sinus rhythm with 1st degree AV block   Minimal voltage criteria for LVH, may be normal variant   Prolonged QT   Abnormal ECG    Rate 84 bpm. WI interval 272 ms. QRS duration 78 ms. QT/QTc 412/486 ms. P-R-T axes 56 69 61.    ECG  ECG taken at 0533, ECG read at 0546  Sinus rhythm with short WI   Minimal voltage criteria for LVH, may be normal variant   Nonspecific ST abnormality   Abnormal ECG    Rate 76 bpm. WI interval 100 ms. QRS duration 90 ms. QT/QTc 426/479 ms. P-R-T axes -24 72 53.     Laboratory:  Labs Ordered and Resulted from Time of ED Arrival to Time of ED Departure   COMPREHENSIVE METABOLIC PANEL - Abnormal       Result Value    Sodium 135 (*)     Potassium 2.9 (*)     Chloride 93 (*)     Carbon Dioxide (CO2) 23      Anion Gap 19 (*)     Urea Nitrogen 14.0      Creatinine 0.74      Calcium 9.9      Glucose 150 (*)     Alkaline Phosphatase 62      AST 38      ALT 22      Protein Total 9.0 (*)     Albumin 5.2      Bilirubin Total 0.6      GFR Estimate >90     TROPONIN T, HIGH SENSITIVITY - Abnormal    Troponin T, High Sensitivity 32 (*)    CBC WITH PLATELETS AND DIFFERENTIAL - Abnormal    WBC Count 7.7      RBC Count 4.09      Hemoglobin 14.4      Hematocrit 41.1       (*)     MCH 35.2 (*)     MCHC 35.0      RDW 11.8      Platelet Count 352      % Neutrophils 84      % Lymphocytes 7      % Monocytes 9      % Eosinophils 0      % Basophils 0      % Immature Granulocytes 0      NRBCs per 100 WBC 0      Absolute Neutrophils 6.4      Absolute  Lymphocytes 0.5 (*)     Absolute Monocytes 0.7      Absolute Eosinophils 0.0      Absolute Basophils 0.0      Absolute Immature Granulocytes 0.0      Absolute NRBCs 0.0     TROPONIN T, HIGH SENSITIVITY - Abnormal    Troponin T, High Sensitivity 24 (*)    LIPASE - Normal    Lipase 18     HCG QUALITATIVE PREGNANCY - Normal    hCG Serum Qualitative Negative          Emergency Department Course & Assessments:    Interventions:  Medications   ondansetron (ZOFRAN) injection 4 mg (4 mg Intravenous $Given 23 0246)   famotidine (PEPCID) injection 20 mg (20 mg Intravenous $Given 23 0246)   haloperidol lactate (HALDOL) injection 2 mg (2 mg Intramuscular $Given 23 0250)   potassium chloride (KLOR-CON) Packet 40 mEq (40 mEq Oral $Given 23 5923)        Assessments:  0234 I obtained history and examined the patient as noted above.   0534 I rechecked the patient.     Independent Interpretation (X-rays, CTs, rhythm strip):  None    Consultations/Discussion of Management or Tests:  None    Social Determinants of Health affecting care:  none     Disposition:  Care of the patient was transferred to my colleague Dr. Osorio pending repeat troponin.     Impression & Plan    CMS Diagnoses: None    Medical Decision Makin-year-old female came in complaining of continued epigastric pain rating to her chest and vomiting.  Of note this is her third visit to our ED in the past month.  She had previous CT abdomen pelvis that was reassuring as well as chest x-ray 2 days ago.  Differential includes cyclic vomiting, cannabis induced hyperemesis syndrome, gastritis, ACS equivalent, or other causes.  Work-up thus far is otherwise nonspecific.  She is benign abdomen and I see no reason for repeat advanced imaging.  She has had a chest x-ray 2 days ago and not believe needs to be repeated.  She has a minimally elevated troponin with no acute ischemic changes on her EKG.   She is chest pain-free upon reevaluation.  Repeat  troponin is currently pending.  Case been discussed signout to oncoming ED physician Dr. Osorio will follow up on troponin and reevaluate and discharge if troponin is not elevated and appropriate .    Diagnosis:    ICD-10-CM    1. Nausea and vomiting, unspecified vomiting type  R11.2       2. Atypical chest pain  R07.89       3. Epigastric pain  R10.13            Discharge Medications:  Discharge Medication List as of 6/19/2023  6:57 AM      START taking these medications    Details   !! ondansetron (ZOFRAN ODT) 4 MG ODT tab Take 1 tablet (4 mg) by mouth every 8 hours as needed for nausea, Disp-10 tablet, R-0, [facility]       !! - Potential duplicate medications found. Please discuss with provider.         Scribe Disclosure:  I, Shea Vasquez, am serving as a scribe at 3:16 AM on 6/19/2023 to document services personally performed by Sheldno Kirkland MD based on my observations and the provider's statements to me.    6/19/2023   Sheldon Kirkland MD Shapin, Ryan P, MD  06/19/23 1194

## 2023-06-19 NOTE — TELEPHONE ENCOUNTER
Call from patient who says she has been seen in the ED several times, stabilized but isn't admitted. Patient says when she gets home, she still feels the same way. Patient says they are not getting to the root of the problem. Patient says clinic does not have any appointment either.  Patient asking if she can talk to a doctor over the phone to find out reason she was not admitted. Patient says she does not feel she is getting the care she needs and may switch network.    Informed the ED only stabilize people, they do not try to find out what is wrong if it is complicated, she needs to connect w/ her clinic for further testing or referrals. Advised patient that doctors make the decisions when she is being evaluated and they don't talk to patient on the phone.     Patient said ok then disconnected.      Jamel Rust RN, BSN  Triage Nurse Advisor    Reason for Disposition    [1] Caller requesting NON-URGENT health information AND [2] PCP's office is the best resource    Protocols used: INFORMATION ONLY CALL - NO TRIAGE-A-

## 2023-06-19 NOTE — ED TRIAGE NOTES
Pt arrives with nausea and vomiting since yesterday, states  Discomfort due to vomiting. Pt states she was seen recently for the same thing and the only thing that help was a medication that started with D for pain. Pt states medications from prior discharge are not working at home. ABCs intact and AOx4.      Triage Assessment     Row Name 06/19/23 0159       Triage Assessment (Adult)    Airway WDL WDL       Respiratory WDL    Respiratory WDL WDL       Cardiac WDL    Cardiac WDL WDL

## 2023-06-20 ENCOUNTER — TELEPHONE (OUTPATIENT)
Dept: INTERNAL MEDICINE | Facility: CLINIC | Age: 43
End: 2023-06-20

## 2023-06-20 ENCOUNTER — OFFICE VISIT (OUTPATIENT)
Dept: PEDIATRICS | Facility: CLINIC | Age: 43
End: 2023-06-20
Payer: COMMERCIAL

## 2023-06-20 ENCOUNTER — E-VISIT (OUTPATIENT)
Dept: INTERNAL MEDICINE | Facility: CLINIC | Age: 43
End: 2023-06-20
Payer: COMMERCIAL

## 2023-06-20 VITALS
WEIGHT: 135 LBS | BODY MASS INDEX: 21.14 KG/M2 | HEART RATE: 78 BPM | DIASTOLIC BLOOD PRESSURE: 108 MMHG | RESPIRATION RATE: 18 BRPM | SYSTOLIC BLOOD PRESSURE: 168 MMHG | OXYGEN SATURATION: 99 % | TEMPERATURE: 99 F

## 2023-06-20 DIAGNOSIS — G47.00 INSOMNIA, UNSPECIFIED TYPE: ICD-10-CM

## 2023-06-20 DIAGNOSIS — R10.13 EPIGASTRIC PAIN: Primary | ICD-10-CM

## 2023-06-20 LAB
ALBUMIN SERPL BCG-MCNC: 4.6 G/DL (ref 3.5–5.2)
ALP SERPL-CCNC: 61 U/L (ref 35–104)
ALT SERPL W P-5'-P-CCNC: 21 U/L (ref 0–50)
ANION GAP SERPL CALCULATED.3IONS-SCNC: 14 MMOL/L (ref 7–15)
AST SERPL W P-5'-P-CCNC: 36 U/L (ref 0–45)
BASOPHILS # BLD AUTO: 0 10E3/UL (ref 0–0.2)
BASOPHILS NFR BLD AUTO: 0 %
BILIRUB SERPL-MCNC: 0.5 MG/DL
BUN SERPL-MCNC: 8.2 MG/DL (ref 6–20)
CALCIUM SERPL-MCNC: 9.6 MG/DL (ref 8.6–10)
CHLORIDE SERPL-SCNC: 88 MMOL/L (ref 98–107)
CREAT SERPL-MCNC: 0.63 MG/DL (ref 0.51–0.95)
DEPRECATED HCO3 PLAS-SCNC: 27 MMOL/L (ref 22–29)
EOSINOPHIL # BLD AUTO: 0.1 10E3/UL (ref 0–0.7)
EOSINOPHIL NFR BLD AUTO: 1 %
ERYTHROCYTE [DISTWIDTH] IN BLOOD BY AUTOMATED COUNT: 11.4 % (ref 10–15)
GFR SERPL CREATININE-BSD FRML MDRD: >90 ML/MIN/1.73M2
GLUCOSE SERPL-MCNC: 129 MG/DL (ref 70–99)
HCT VFR BLD AUTO: 41.4 % (ref 35–47)
HGB BLD-MCNC: 15.3 G/DL (ref 11.7–15.7)
IMM GRANULOCYTES # BLD: 0 10E3/UL
IMM GRANULOCYTES NFR BLD: 0 %
LIPASE SERPL-CCNC: 19 U/L (ref 13–60)
LYMPHOCYTES # BLD AUTO: 1.2 10E3/UL (ref 0.8–5.3)
LYMPHOCYTES NFR BLD AUTO: 24 %
MCH RBC QN AUTO: 36.3 PG (ref 26.5–33)
MCHC RBC AUTO-ENTMCNC: 37 G/DL (ref 31.5–36.5)
MCV RBC AUTO: 98 FL (ref 78–100)
MONOCYTES # BLD AUTO: 0.7 10E3/UL (ref 0–1.3)
MONOCYTES NFR BLD AUTO: 13 %
NEUTROPHILS # BLD AUTO: 3 10E3/UL (ref 1.6–8.3)
NEUTROPHILS NFR BLD AUTO: 62 %
NRBC # BLD AUTO: 0 10E3/UL
NRBC BLD AUTO-RTO: 0 /100
PLATELET # BLD AUTO: 335 10E3/UL (ref 150–450)
POTASSIUM SERPL-SCNC: 3.4 MMOL/L (ref 3.4–5.3)
PROT SERPL-MCNC: 7.9 G/DL (ref 6.4–8.3)
RBC # BLD AUTO: 4.22 10E6/UL (ref 3.8–5.2)
SODIUM SERPL-SCNC: 129 MMOL/L (ref 136–145)
WBC # BLD AUTO: 4.9 10E3/UL (ref 4–11)

## 2023-06-20 PROCEDURE — 83690 ASSAY OF LIPASE: CPT | Performed by: FAMILY MEDICINE

## 2023-06-20 PROCEDURE — 99214 OFFICE O/P EST MOD 30 MIN: CPT | Mod: 25 | Performed by: FAMILY MEDICINE

## 2023-06-20 PROCEDURE — 99421 OL DIG E/M SVC 5-10 MIN: CPT | Performed by: INTERNAL MEDICINE

## 2023-06-20 PROCEDURE — 96361 HYDRATE IV INFUSION ADD-ON: CPT | Performed by: FAMILY MEDICINE

## 2023-06-20 PROCEDURE — 36415 COLL VENOUS BLD VENIPUNCTURE: CPT | Performed by: FAMILY MEDICINE

## 2023-06-20 PROCEDURE — 80053 COMPREHEN METABOLIC PANEL: CPT | Performed by: FAMILY MEDICINE

## 2023-06-20 PROCEDURE — 85025 COMPLETE CBC W/AUTO DIFF WBC: CPT | Performed by: FAMILY MEDICINE

## 2023-06-20 PROCEDURE — 96374 THER/PROPH/DIAG INJ IV PUSH: CPT | Performed by: FAMILY MEDICINE

## 2023-06-20 RX ORDER — ZOLPIDEM TARTRATE 5 MG/1
5 TABLET ORAL
Qty: 15 TABLET | Refills: 0 | Status: SHIPPED | OUTPATIENT
Start: 2023-06-20 | End: 2023-09-13

## 2023-06-20 RX ORDER — ONDANSETRON 2 MG/ML
4 INJECTION INTRAMUSCULAR; INTRAVENOUS
Status: ACTIVE | OUTPATIENT
Start: 2023-06-20 | End: 2023-06-21

## 2023-06-20 RX ADMIN — Medication 1000 ML: at 11:12

## 2023-06-20 RX ADMIN — ONDANSETRON 4 MG: 2 INJECTION INTRAMUSCULAR; INTRAVENOUS at 11:22

## 2023-06-20 NOTE — TELEPHONE ENCOUNTER
Patient calling. She wants a medication that will help her get some sleep. Patient stated she still has headache and vomiting. Patient uses [facility] [address] [employer]. Ok to call and lm [phone]

## 2023-06-20 NOTE — TELEPHONE ENCOUNTER
Pt calling again. Pt reports she is dehydrated. She is vomiting started Last night. Maybe 3-4 times. This AM non-stop vomiting. She is drinking water and this is what she throws up. She has headaches from vomiting and lack of sleeping for 3 days.     [facility] She went there before, in November, and they gave her IVF.     She is not pregnant.   She thinks this is due to Gastritis.     She states her troponin level is elevated per the ED yesterday but was decreasing.   She states has pain in her esophagus area from vomiting and her heart feels like beating faster from dehydration. She is not sure how fast.     Please advise if would be eligible for ADS? She is declining to go to the ED again.

## 2023-06-20 NOTE — PROGRESS NOTES
Assessment & Plan     Epigastric pain  - IV access  - sodium chloride (PF) 0.9% PF flush 3 mL  - Lipase  - Comprehensive metabolic panel  - CBC with platelets differential  - ondansetron (ZOFRAN) injection 4 mg  - 0.9% sodium chloride BOLUS  - Lipase  - Comprehensive metabolic panel  - CBC with platelets differential  - lidocaine (viscous) (XYLOCAINE) 2 % 15 mL, alum & mag hydroxide-simethicone (MAALOX) 15 mL GI Cocktail     Patient noting improvement after GI cocktail, zofran, and IV fluids. Lab work reassuring. Continue zofran as needed.     She is to maintain on her current medications for GI discomfort/gastritis  and keep appointment with gastroenterology     See AVS summary for additional recommendations reviewed with patient during this visit.       Madi Rutherford MD   [facility]    Shandra Grubbs is a 42 year old female who presents to clinic today for the following health issues:  Chief Complaint   Patient presents with     Abdominal Pain     Epigastric pain X 4 days     HPI    Patient reports that she is having ongoing epigastric discomfort d.  She is also taking Zofran to help with the nausea.  She is currently taking Protonix as an acid reducing medication.  No episodes of diarrhea.  Bowel movements have been decreased due to decreased appetite.  The pain has not worsened but remains moderate.  She feels that she may be a little bit dehydrated and requests IV fluids today    She also tried sucralfate for pain -- slight relief    Hx of cholecystectomy      Patient Active Problem List    Diagnosis Date Noted     Mild episode of recurrent major depressive disorder (H) 04/07/2022     Priority: Medium     Controlled substance agreement terminated 04/05/2022     Priority: Medium     Patient is followed by [facility] for ongoing prescription of benzodiazepines.  All refills should be approved by this provider, or covering partner.    Medication(s): ambien .   Maximum quantity per  month: 15  Clinic visit frequency required: Q 6  months     Controlled substance agreement on file: Yes       Date(s): 4/1/2022      Last [facility] website verification:  done on 4/5/2022  [email or web address]    Positive marijuana on urine tox - terminated 4/6/2022         Epigastric pain 03/29/2022     Priority: Medium     NSTEMI (non-ST elevated myocardial infarction) (H) 03/29/2022     Priority: Medium     Chronic gastritis, presence of bleeding unspecified, unspecified gastritis type 03/29/2022     Priority: Medium     Intractable vomiting with nausea, unspecified vomiting type 03/29/2022     Priority: Medium       Current Outpatient Medications   Medication     carvedilol (COREG) 3.125 MG tablet     fluticasone (FLONASE) 50 MCG/ACT nasal spray     hydrOXYzine (ATARAX) 25 MG tablet     Melatonin 5 MG CHEW     metoclopramide (REGLAN) 5 MG tablet     ondansetron (ZOFRAN ODT) 4 MG ODT tab     ondansetron (ZOFRAN ODT) 4 MG ODT tab     pantoprazole (PROTONIX) 40 MG EC tablet     potassium chloride ER (K-TAB) 20 MEQ CR tablet     prochlorperazine (COMPAZINE) 10 MG tablet     promethazine (PHENERGAN) 25 MG suppository     sucralfate (CARAFATE) 1 GM/10ML suspension     sucralfate (CARAFATE) 1 GM/10ML suspension     zolpidem (AMBIEN) 5 MG tablet     Current Facility-Administered Medications   Medication     ondansetron (ZOFRAN) injection 4 mg     sodium chloride (PF) 0.9% PF flush 3 mL     sodium chloride (PF) 0.9% PF flush 5 mL           Objective    BP (!) 168/108 (BP Location: Left arm, Patient Position: Chair, Cuff Size: Adult Regular)   Pulse 78   Temp 99  F (37.2  C) (Oral)   Resp 18   Wt 61.2 kg (135 lb)   LMP 06/18/2023 (Approximate)   SpO2 99%   BMI 21.14 kg/m    Physical Exam     Abd: no guarding    Results for orders placed or performed in visit on 06/20/23   Lipase     Status: Normal   Result Value Ref Range    Lipase 19 13 - 60 U/L   Comprehensive metabolic panel     Status: Abnormal    Result Value Ref Range    Sodium 129 (L) 136 - 145 mmol/L    Potassium 3.4 3.4 - 5.3 mmol/L    Chloride 88 (L) 98 - 107 mmol/L    Carbon Dioxide (CO2) 27 22 - 29 mmol/L    Anion Gap 14 7 - 15 mmol/L    Urea Nitrogen 8.2 6.0 - 20.0 mg/dL    Creatinine 0.63 0.51 - 0.95 mg/dL    Calcium 9.6 8.6 - 10.0 mg/dL    Glucose 129 (H) 70 - 99 mg/dL    Alkaline Phosphatase 61 35 - 104 U/L    AST 36 0 - 45 U/L    ALT 21 0 - 50 U/L    Protein Total 7.9 6.4 - 8.3 g/dL    Albumin 4.6 3.5 - 5.2 g/dL    Bilirubin Total 0.5 <=1.2 mg/dL    GFR Estimate >90 >60 mL/min/1.73m2   CBC with platelets and differential     Status: Abnormal   Result Value Ref Range    WBC Count 4.9 4.0 - 11.0 10e3/uL    RBC Count 4.22 3.80 - 5.20 10e6/uL    Hemoglobin 15.3 11.7 - 15.7 g/dL    Hematocrit 41.4 35.0 - 47.0 %    MCV 98 78 - 100 fL    MCH 36.3 (H) 26.5 - 33.0 pg    MCHC 37.0 (H) 31.5 - 36.5 g/dL    RDW 11.4 10.0 - 15.0 %    Platelet Count 335 150 - 450 10e3/uL    % Neutrophils 62 %    % Lymphocytes 24 %    % Monocytes 13 %    % Eosinophils 1 %    % Basophils 0 %    % Immature Granulocytes 0 %    NRBCs per 100 WBC 0 <1 /100    Absolute Neutrophils 3.0 1.6 - 8.3 10e3/uL    Absolute Lymphocytes 1.2 0.8 - 5.3 10e3/uL    Absolute Monocytes 0.7 0.0 - 1.3 10e3/uL    Absolute Eosinophils 0.1 0.0 - 0.7 10e3/uL    Absolute Basophils 0.0 0.0 - 0.2 10e3/uL    Absolute Immature Granulocytes 0.0 <=0.4 10e3/uL    Absolute NRBCs 0.0 10e3/uL   CBC with platelets differential     Status: Abnormal    Narrative    The following orders were created for panel order CBC with platelets differential.  Procedure                               Abnormality         Status                     ---------                               -----------         ------                     CBC with platelets and d...[[id number]]  Abnormal            Final result                 Please view results for these tests on the individual orders.                     The use of Dragon/[employer]  dictation services may have been used to construct the content in this note; any grammatical or spelling errors are non-intentional. Please contact the author of this note directly if you are in need of any clarification.

## 2023-06-20 NOTE — TELEPHONE ENCOUNTER
Medication Question or Refill    Contacts       Type Contact Phone/Fax    06/20/2023 12:54 PM CDT Phone (Incoming) Humera Lovell (Self) [phone] (M)     Ok to leave a detailed message          What medication are you calling about (include dose and sig)?: Lunesta    Preferred Pharmacy:   [id number] - [place], [place] - [address] [facility] [address]  [address]  [facility] [address]  Phone: [phone] Fax: [phone]    [facility]  [address] [place] [address]        Controlled Substance Agreement on file:   CSA -- Patient Level:     [Media Unavailable] Controlled Substance Agreement - Non - Opioid - Scan on 4/18/2022  3:33 PM: NON-OPIOID CONTROLLED SUBSTANCE AGREEMENT       Who prescribed the medication?: [place]    Do you need a refill? Yes, Unsure about refill - asked for enough for a short time 3 days    When did you use the medication last? 2022    Patient offered an appointment? Yes: Appointments are out to August - Attempted [facility] but was denied and told to contact primary    Do you have any questions or concerns?  No      Could we send this information to you in [facility] or would you prefer to receive a phone call?:   Patient would prefer a phone call   Okay to leave a detailed message?: Yes at Home number on file [phone] (home)

## 2023-06-20 NOTE — PATIENT INSTRUCTIONS
If your symptoms worsen at any point please seek medical attention       Drink 80 ounces of water a day to keep hydrated      Continue the medications as recently prescribed to you

## 2023-06-21 ENCOUNTER — TELEPHONE (OUTPATIENT)
Dept: BEHAVIORAL HEALTH | Facility: CLINIC | Age: 43
End: 2023-06-21

## 2023-06-21 NOTE — TELEPHONE ENCOUNTER
[facility] received ED alert for patient and reviewed chart to determine eligibility for [facility] Care Coordination.     SWCC reached out to patient and it went to voicemail. CC left message with callback information. CC sent MyChart message with [facility] information in case patient is interested in the program.    ENA Conner  [facility] ([facility])   [facility]  Office: [phone]

## 2023-06-27 NOTE — TELEPHONE ENCOUNTER
Called patient. She states that she had made this request hoping to get either the Lunesta or Zolpidem. She has been taking Zolpidem ordered by Dr. Bedolla in 6/20/23 E-visit and it is helping. She has been trying to avoid taking zolpidem every day so it works better on days she needs it. She does not need prescription for Lunesta.     Gisele Johnson RN  [facility]

## 2023-06-27 NOTE — TELEPHONE ENCOUNTER
I gave him ambien on the 20th. Has he tried those? At 5 mg ? If that did not work did he try 2 of them for 10 mg?

## 2023-07-02 ENCOUNTER — HEALTH MAINTENANCE LETTER (OUTPATIENT)
Age: 43
End: 2023-07-02

## 2023-08-22 ENCOUNTER — LAB (OUTPATIENT)
Dept: LAB | Facility: CLINIC | Age: 43
End: 2023-08-22
Payer: COMMERCIAL

## 2023-08-22 ENCOUNTER — OFFICE VISIT (OUTPATIENT)
Dept: GASTROENTEROLOGY | Facility: CLINIC | Age: 43
End: 2023-08-22
Payer: COMMERCIAL

## 2023-08-22 VITALS
OXYGEN SATURATION: 99 % | WEIGHT: 138.1 LBS | DIASTOLIC BLOOD PRESSURE: 91 MMHG | BODY MASS INDEX: 21.67 KG/M2 | SYSTOLIC BLOOD PRESSURE: 131 MMHG | HEART RATE: 88 BPM | HEIGHT: 67 IN

## 2023-08-22 DIAGNOSIS — R10.13 EPIGASTRIC PAIN: ICD-10-CM

## 2023-08-22 DIAGNOSIS — R11.15 CYCLIC VOMITING SYNDROME: ICD-10-CM

## 2023-08-22 DIAGNOSIS — R11.15 CYCLIC VOMITING SYNDROME: Primary | ICD-10-CM

## 2023-08-22 PROCEDURE — 99214 OFFICE O/P EST MOD 30 MIN: CPT | Mod: GC | Performed by: STUDENT IN AN ORGANIZED HEALTH CARE EDUCATION/TRAINING PROGRAM

## 2023-08-22 RX ORDER — OMEPRAZOLE 40 MG/1
40 CAPSULE, DELAYED RELEASE ORAL 2 TIMES DAILY
Qty: 120 CAPSULE | Refills: 0 | Status: SHIPPED | OUTPATIENT
Start: 2023-08-22 | End: 2023-09-13

## 2023-08-22 ASSESSMENT — PAIN SCALES - GENERAL: PAINLEVEL: NO PAIN (0)

## 2023-08-22 NOTE — NURSING NOTE
"Chief Complaint   Patient presents with    Follow Up       Vitals:    08/22/23 1403   BP: (!) 131/91   Pulse: 88   SpO2: 99%   Weight: 62.6 kg (138 lb 1.6 oz)   Height: 1.702 m (5' 7\")       Body mass index is 21.63 kg/m .    Daniella Azevedo MA    "

## 2023-08-22 NOTE — PATIENT INSTRUCTIONS
Thank you for visiting us today.    Please get a stool sample to check for H.pylori. Please get the stool kit today before leaving.    After sending your stool test, please start taking Omeprazole 40 mg twice daily for at least 2 months. We will discuss at that time about the next step.    Please get your upper endoscopy (EGD) and colonoscopy scheduled.     Please avoid any form of marijuana.     We will see you again in 3 months.
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
## 2023-08-24 ENCOUNTER — TELEPHONE (OUTPATIENT)
Dept: GASTROENTEROLOGY | Facility: CLINIC | Age: 43
End: 2023-08-24
Payer: COMMERCIAL

## 2023-08-24 NOTE — TELEPHONE ENCOUNTER
SHAMAR and sent mychart for patient to schedule:    3 mo follow-up with Dr. Sánchez (around 11/22/23). [facility] GI, in person.

## 2023-09-13 ENCOUNTER — VIRTUAL VISIT (OUTPATIENT)
Dept: INTERNAL MEDICINE | Facility: CLINIC | Age: 43
End: 2023-09-13
Payer: COMMERCIAL

## 2023-09-13 DIAGNOSIS — F33.0 MILD EPISODE OF RECURRENT MAJOR DEPRESSIVE DISORDER (H): ICD-10-CM

## 2023-09-13 DIAGNOSIS — N83.209 CYST OF OVARY, UNSPECIFIED LATERALITY: Primary | ICD-10-CM

## 2023-09-13 DIAGNOSIS — G47.00 INSOMNIA, UNSPECIFIED TYPE: ICD-10-CM

## 2023-09-13 DIAGNOSIS — N92.6 IRREGULAR MENSES: ICD-10-CM

## 2023-09-13 PROCEDURE — 99214 OFFICE O/P EST MOD 30 MIN: CPT | Mod: VID | Performed by: NURSE PRACTITIONER

## 2023-09-13 RX ORDER — ZOLPIDEM TARTRATE 5 MG/1
5 TABLET ORAL
Qty: 30 TABLET | Refills: 1 | Status: SHIPPED | OUTPATIENT
Start: 2023-09-13 | End: 2023-12-29

## 2023-09-13 ASSESSMENT — ANXIETY QUESTIONNAIRES
5. BEING SO RESTLESS THAT IT IS HARD TO SIT STILL: SEVERAL DAYS
IF YOU CHECKED OFF ANY PROBLEMS ON THIS QUESTIONNAIRE, HOW DIFFICULT HAVE THESE PROBLEMS MADE IT FOR YOU TO DO YOUR WORK, TAKE CARE OF THINGS AT HOME, OR GET ALONG WITH OTHER PEOPLE: SOMEWHAT DIFFICULT
3. WORRYING TOO MUCH ABOUT DIFFERENT THINGS: SEVERAL DAYS
1. FEELING NERVOUS, ANXIOUS, OR ON EDGE: MORE THAN HALF THE DAYS
7. FEELING AFRAID AS IF SOMETHING AWFUL MIGHT HAPPEN: MORE THAN HALF THE DAYS
6. BECOMING EASILY ANNOYED OR IRRITABLE: SEVERAL DAYS
GAD7 TOTAL SCORE: 11
GAD7 TOTAL SCORE: 11
2. NOT BEING ABLE TO STOP OR CONTROL WORRYING: MORE THAN HALF THE DAYS

## 2023-09-13 ASSESSMENT — PATIENT HEALTH QUESTIONNAIRE - PHQ9
SUM OF ALL RESPONSES TO PHQ QUESTIONS 1-9: 11
5. POOR APPETITE OR OVEREATING: MORE THAN HALF THE DAYS

## 2023-09-13 NOTE — PROGRESS NOTES
Humera is a 42 year old who is being evaluated via a billable video visit.      How would you like to obtain your AVS? Chantal  If the video visit is dropped, the invitation should be resent by: chantal  Will anyone else be joining your video visit? No          Assessment & Plan     Insomnia, unspecified type  She has tried over-the-counter medicines and CBD products.  Her GI person does not want her to do CBD products because they think that it contributes to her nausea.  She does have a future endoscopy and colonoscopy  In the past Ambien worked well for her.  She says she stopped it, however it says that she failed the controlled substance agreement because she was positive for marijuana on her tox screen.  As marijuana now is legal that would not preclude her from getting the medicine.  She has an appointment in November for a physical and at that time she can do her urine tox screen and sign her controlled substance agreement.  She is agreeable to this    Mild episode of recurrent major depressive disorder (H)  She is going to be seeing mental health provider soon for medication management for depression and anxiety    Cyst of ovary, unspecified laterality  This was seen on a CT scan in June 2023 and she was told to follow-up and she has not.  Pelvic ultrasound ordered and she does want to see OB/GYN so referral was given    Irregular menses  Discussed and OB/GYN referral given      18 minutes spent by me on the date of the encounter doing chart review, history and exam, documentation and further activities per the note       Patient Instructions   Ambien prescription     Pelvic ultrasound     Ob gyn referral     CHONG Abdalla [facility]    Shandra Grubbs is a 42 year old, presenting for the following health issues:  Chief Complaint   Patient presents with    Recheck Medication     Sleep problems, insomnia, onset x 10 days is worse. Using OTC but not helping.            9/13/2023    11:07 AM   Additional Questions   Roomed by manjeet STUART       HPI     Gummy and CBD   Gummy for sleep stopped from GI - throwing up a lot   Endoscopy and colonoscopy scheduled     Seeing psych for anxiety in future     Ambien in past worked   Will sign CSA in November and do urine tox     Ovarian cyst on CT scan   Irregular periods   Has some mood issues around period time even without  bleeding     Review of Systems   Constitutional, HEENT, cardiovascular, pulmonary, GI, , musculoskeletal, neuro, skin, endocrine and psych systems are negative, except as otherwise noted.      Objective           Vitals:  No vitals were obtained today due to virtual visit.    Physical Exam   GENERAL: alert and no distress  EYES: Eyes grossly normal to inspection.  No discharge or erythema, or obvious scleral/conjunctival abnormalities.  RESP: No audible wheeze, cough, or visible cyanosis.  No visible retractions or increased work of breathing.    SKIN: Visible skin clear. No significant rash, abnormal pigmentation or lesions.  NEURO: Cranial nerves grossly intact.  Mentation and speech appropriate for age.  PSYCH: Mentation appears normal, affect normal/bright, judgement and insight intact, normal speech and appearance well-groomed.    Pelvic ultrasound             Video-Visit Details    Type of service:  Video Visit   Video Start Time: 11:39 AM  Video End Time:11:53 AM    Originating Location (pt. Location): Home    Distant Location (provider location):  On-site  Platform used for Video Visit: Hango

## 2023-09-13 NOTE — NURSING NOTE
"Chief Complaint   Patient presents with    Recheck Medication     Sleep problems, insomnia, onset x 10 days is worse. Using OTC but not helping.     initial There were no vitals taken for this visit. Estimated body mass index is 21.63 kg/m  as calculated from the following:    Height as of 8/22/23: 1.702 m (5' 7\").    Weight as of 8/22/23: 62.6 kg (138 lb 1.6 oz)..  bp completed using cuff size NA (Not Taken)  ROE TUCKER LPN  "

## 2023-09-14 ENCOUNTER — ANCILLARY PROCEDURE (OUTPATIENT)
Dept: ULTRASOUND IMAGING | Facility: CLINIC | Age: 43
End: 2023-09-14
Attending: NURSE PRACTITIONER
Payer: COMMERCIAL

## 2023-09-14 DIAGNOSIS — N83.209 CYST OF OVARY, UNSPECIFIED LATERALITY: ICD-10-CM

## 2023-09-14 PROCEDURE — 76830 TRANSVAGINAL US NON-OB: CPT | Performed by: OBSTETRICS & GYNECOLOGY

## 2023-09-14 PROCEDURE — 76856 US EXAM PELVIC COMPLETE: CPT | Performed by: OBSTETRICS & GYNECOLOGY

## 2023-10-02 PROCEDURE — 99000 SPECIMEN HANDLING OFFICE-LAB: CPT | Performed by: PATHOLOGY

## 2023-10-02 PROCEDURE — 87338 HPYLORI STOOL AG IA: CPT | Performed by: STUDENT IN AN ORGANIZED HEALTH CARE EDUCATION/TRAINING PROGRAM

## 2023-10-03 LAB — H PYLORI AG STL QL IA: POSITIVE

## 2023-10-03 NOTE — RESULT ENCOUNTER NOTE
Robert Ms. Lovell,    Your stool test came back positive for H.pylori infection. This could be the reason why you are experiencing your gastrointestinal symptoms. This infection will need an antibiotic regimen (usually 3 or 4 medications) for 14 days, and it's very important to make sure your are taking all your medications every day without skipping any dose. We have place a Medication therapy manage referral to further assist you with this.

## 2023-10-06 ENCOUNTER — TELEPHONE (OUTPATIENT)
Dept: GASTROENTEROLOGY | Facility: CLINIC | Age: 43
End: 2023-10-06

## 2023-10-12 ENCOUNTER — VIRTUAL VISIT (OUTPATIENT)
Dept: GASTROENTEROLOGY | Facility: CLINIC | Age: 43
End: 2023-10-12
Attending: INTERNAL MEDICINE
Payer: COMMERCIAL

## 2023-10-12 DIAGNOSIS — A04.8 BACTERIAL INFECTION DUE TO H. PYLORI: Primary | ICD-10-CM

## 2023-10-12 RX ORDER — BISMUTH SUBCITRATE POTASSIUM, METRONIDAZOLE, TETRACYCLINE HYDROCHLORIDE 140; 125; 125 MG/1; MG/1; MG/1
3 CAPSULE ORAL
Qty: 120 CAPSULE | Refills: 0 | Status: SHIPPED | OUTPATIENT
Start: 2023-10-12 | End: 2023-12-29

## 2023-10-12 NOTE — Clinical Note
10/12/2023         RE: Humera Lovell  [address]  [address]  [place] [address]        Dear Colleague,    Thank you for referring your patient, Humera Lovell, to the [facility]. Please see a copy of my visit note below.    Medication Therapy Management (MTM) Encounter    ASSESSMENT:                            Medication Adherence/Access: {adherencechoices:889882}    H. Pylori:  ***      PLAN:                            Pregnant or breastfeeding?  Taking multivitamins, antacids etc.?  Did you start taking the omeprazole 40 mg twice daily? - has about 115 remaining  Do you have it available?   Avoid multivitamin and magnesium.   Omeprazole 40 mg twice daily     Follow-up: {followuptest2:801447}    EDUCATION:     We reviewed H pylori today including background information, indications for treatment and potential modes of transmission. We also discussed potential for antibiotic resistance and importance of finishing treatment if able, as well as follow-up testing. Discussed bismuth quadruple therapy with Pylera today including medications, dosing, side effects, precautions and general counseling information.    SUBJECTIVE/OBJECTIVE:                          Humera Lovell is a 42 year old female { :750866} for {mtmvisit:510116}     Reason for visit: ***.    Allergies/ADRs: Reviewed in chart  Past Medical History: {1/2/3/4/5:039176}  Tobacco: She reports that she has never smoked. She has never used smokeless tobacco. Uses a vape device.   Alcohol: 1-3 beverages / week      Medication Adherence/Access: {fumedadherence:469048}    H. Pylori:   ***    No known drug allergies. No kidney or liver problems. Not pregnant or breast feeding. Will set an alarm reminder.     GFR Estimate   Date Value Ref Range Status   06/20/2023 >90 >60 mL/min/1.73m2 Final   03/09/2021 >90 >60 mL/min/[1.73_m2] Final     Comment:     Non  GFR Calc  Starting 12/18/2018, serum creatinine based  estimated GFR (eGFR) will be   calculated using the Chronic Kidney Disease Epidemiology Collaboration   (CKD-EPI) equation.       GFR, ESTIMATED POCT   Date Value Ref Range Status   09/04/2022 >60 >60 mL/min/1.73m2 Final     GFR Estimate If Black   Date Value Ref Range Status   03/09/2021 >90 >60 mL/min/[1.73_m2] Final     Comment:      GFR Calc  Starting 12/18/2018, serum creatinine based estimated GFR (eGFR) will be   calculated using the Chronic Kidney Disease Epidemiology Collaboration   (CKD-EPI) equation.     Liver Function Studies -   Recent Labs   Lab Test 06/20/23  1110   PROTTOTAL 7.9   ALBUMIN 4.6   BILITOTAL 0.5   ALKPHOS 61   AST 36   ALT 21     Hydroxyzine pamoate 25 mg 1-2 capsules by mouth at bedtime    Fluticasone nasal spray 1 spray each nostril once daily as needed      Today's Vitals: There were no vitals taken for this visit.  ----------------  {ALINE?:674915}    I spent {mt total time 3:555963} with this patient today. { :609184}. A copy of the visit note was provided to the patient's provider(s).    A summary of these recommendations {GIVEN/NOT GIVEN:612665}.    ***    Telemedicine Visit Details  Type of service:  Telephone visit  Start Time:  9:   End Time: {video/phone visit end time:209826}     Medication Therapy Recommendations  No medication therapy recommendations to display         Again, thank you for allowing me to participate in the care of your patient.        Sincerely,        José Miguel Carcamo RPH

## 2023-10-12 NOTE — PROGRESS NOTES
Medication Therapy Management (MTM) Encounter    ASSESSMENT:                            Medication Adherence/Access: No issues identified    H. Pylori:  Humera would benefit from initial treatment for H. Pylori infection. Given local clarithromycin resistance rates we will use our preferred regimen of bismuth quadruple therapy. She is not pregnant or breastfeeding and no renal or hepatic adjustments are needed. She is currently prescribed omeprazole 40 mg twice daily and it is appropriate to continue this for acid suppressive therapy with the Pylera. I recommend not taking the multivitamin or magnesium supplement while taking antibiotics due to a risk of inefficacy. She should withhold PPI therapy for 1-2 weeks prior to eradication testing. She is indicated for confirmatory eradication testing at least 4 weeks after completion of antibiotics.       PLAN:                            I recommend starting bismuth quadruple therapy x 10 days:  Pylera 140-120-120 mg Take 3 capsules by mouth 4 times daily   Continue omeprazole 40 mg Take 1 capsule by mouth twice daily 30-60 minutes before meals  Avoid taking your multivitamin and magnesium while on antibiotics. Do not start taking any antacids such as Tums or Rolaids.   You will need follow-up eradication testing at least 4 weeks after completion of your antibiotics.     Follow-up: 1-week check-in; confirmatory eradication testing at least 4 weeks after completion of antibiotics.     EDUCATION:     We reviewed H pylori today including background information, indications for treatment and potential modes of transmission. We also discussed potential for antibiotic resistance and importance of finishing treatment if able, as well as follow-up testing. Discussed bismuth quadruple therapy with Pylera today including medications, dosing, side effects, precautions and general counseling information.    SUBJECTIVE/OBJECTIVE:                          Humera Lovell is a 42 year old  female called for an initial visit. She was referred to me from Keaton Breaux MD.      Reason for visit: H. Pylori treatment.    Allergies/ADRs: Reviewed in chart  Past Medical History: Reviewed in chart  Tobacco: She reports that she has never smoked. She has never used smokeless tobacco. Uses a vape device.   Alcohol: 1-3 beverages / week      Medication Adherence/Access: no issues reported    H. Pylori:   No current medications for H. Pylori    Humera was diagnosed with H. Pylori using stool antigen test on 10/2/23. She has no prior treatment of H. Pylori. No previous macrolide exposure on file.  Not pregnant or breast feeding. No known drug allergies. No kidney or liver problems. Will set an alarm reminder for the four times daily dosing. She does occasionally take a magnesium supplement and a multivitamin.     GFR Estimate   Date Value Ref Range Status   06/20/2023 >90 >60 mL/min/1.73m2 Final   03/09/2021 >90 >60 mL/min/[1.73_m2] Final     Comment:     Non  GFR Calc  Starting 12/18/2018, serum creatinine based estimated GFR (eGFR) will be   calculated using the Chronic Kidney Disease Epidemiology Collaboration   (CKD-EPI) equation.       GFR, ESTIMATED POCT   Date Value Ref Range Status   09/04/2022 >60 >60 mL/min/1.73m2 Final     GFR Estimate If Black   Date Value Ref Range Status   03/09/2021 >90 >60 mL/min/[1.73_m2] Final     Comment:      GFR Calc  Starting 12/18/2018, serum creatinine based estimated GFR (eGFR) will be   calculated using the Chronic Kidney Disease Epidemiology Collaboration   (CKD-EPI) equation.     Liver Function Studies -   Recent Labs   Lab Test 06/20/23  1110   PROTTOTAL 7.9   ALBUMIN 4.6   BILITOTAL 0.5   ALKPHOS 61   AST 36   ALT 21           Today's Vitals: There were no vitals taken for this visit.  ----------------      I spent 17 minutes with this patient today. All changes were made via collaborative practice agreement with Keaton Breaux MD. A  copy of the visit note was provided to the patient's provider(s).    A summary of these recommendations was given to the patient.    José Miguel Carcamo PharmD, BCPS  [facility] [place] [facility] Pharmacist   [facility]  Phone: [phone]    Telemedicine Visit Details  Type of service:  Telephone visit  Start Time:  9:35 AM   End Time:  9:52 AM     Medication Therapy Recommendations  No medication therapy recommendations to display

## 2023-10-16 ENCOUNTER — MYC MEDICAL ADVICE (OUTPATIENT)
Dept: GASTROENTEROLOGY | Facility: CLINIC | Age: 43
End: 2023-10-16

## 2023-10-16 RX ORDER — OMEPRAZOLE 40 MG/1
40 CAPSULE, DELAYED RELEASE ORAL 2 TIMES DAILY
COMMUNITY
End: 2024-07-02

## 2023-10-16 NOTE — PATIENT INSTRUCTIONS
"Recommendations from today's MTM visit:                                                      I recommend starting bismuth quadruple therapy x 10 days:  Pylera 140-120-120 mg Take 3 capsules by mouth 4 times daily   Continue omeprazole 40 mg Take 1 capsule by mouth twice daily 30-60 minutes before meals  Avoid taking your multivitamin and magnesium while on antibiotics. Do not start taking any antacids such as Tums or Rolaids.   You will need follow-up eradication testing at least 4 weeks after completion of your antibiotics.     Follow-up: 1-week check-in; confirmatory eradication testing at least 4 weeks after completion of antibiotics.     It was great speaking with you today.  I value your experience and would be very thankful for your time in providing feedback in our clinic survey. In the next few days, you may receive an email or text message from [employer] with a link to a survey related to your  clinical pharmacist.\"     To schedule another MTM appointment, please call the clinic directly or you may call the MTM scheduling line at [phone] or toll-free at [phone].     My Clinical Pharmacist's contact information:                                                      Please feel free to contact me with any questions or concerns you have.      José Miguel Carcamo, PharmD, BCPS  MTM Pharmacist   [facility]  Phone: [phone]   "

## 2023-10-25 NOTE — TELEPHONE ENCOUNTER
Called to remind patient of their upcoming appointment with our GI clinic, on Tuesday, October 31st at 1:40pm with Dr. Lucien Sánchez. This appointment is scheduled as an in-person appt. Please arrive 15 minutes early to check in for your appointment. , if your appointment is virtual (video or telephone) you need to be in [place] for the visit. To reschedule or cancel patient to call [phone].    Aylx Pathak

## 2023-11-16 ENCOUNTER — TELEPHONE (OUTPATIENT)
Dept: GASTROENTEROLOGY | Facility: CLINIC | Age: 43
End: 2023-11-16

## 2023-11-16 NOTE — TELEPHONE ENCOUNTER
Attempted outreach to patient. Trying to discuss H. Pylori treatment regimen and whether she was able to start taking it, if she completed and if so when did she complete it. I provided my call back number [phone].    Jazmin LeeD, BCPS  [place] Pharmacist   [facility]  Phone: [phone]

## 2023-12-29 ENCOUNTER — OFFICE VISIT (OUTPATIENT)
Dept: INTERNAL MEDICINE | Facility: CLINIC | Age: 43
End: 2023-12-29

## 2023-12-29 VITALS
WEIGHT: 141 LBS | HEART RATE: 87 BPM | BODY MASS INDEX: 22.13 KG/M2 | OXYGEN SATURATION: 99 % | TEMPERATURE: 98.7 F | HEIGHT: 67 IN | DIASTOLIC BLOOD PRESSURE: 72 MMHG | RESPIRATION RATE: 14 BRPM | SYSTOLIC BLOOD PRESSURE: 100 MMHG

## 2023-12-29 DIAGNOSIS — R71.8 ELEVATED MCV: Primary | ICD-10-CM

## 2023-12-29 DIAGNOSIS — N76.0 BV (BACTERIAL VAGINOSIS): ICD-10-CM

## 2023-12-29 DIAGNOSIS — Z00.00 ROUTINE GENERAL MEDICAL EXAMINATION AT A HEALTH CARE FACILITY: Primary | ICD-10-CM

## 2023-12-29 DIAGNOSIS — R71.8 ELEVATED MCV: ICD-10-CM

## 2023-12-29 DIAGNOSIS — B96.89 BV (BACTERIAL VAGINOSIS): ICD-10-CM

## 2023-12-29 DIAGNOSIS — Z11.3 SCREEN FOR STD (SEXUALLY TRANSMITTED DISEASE): ICD-10-CM

## 2023-12-29 DIAGNOSIS — Z12.4 CERVICAL CANCER SCREENING: ICD-10-CM

## 2023-12-29 DIAGNOSIS — B37.31 YEAST INFECTION OF THE VAGINA: ICD-10-CM

## 2023-12-29 LAB
ALBUMIN SERPL BCG-MCNC: 4.3 G/DL (ref 3.5–5.2)
ALP SERPL-CCNC: 59 U/L (ref 40–150)
ALT SERPL W P-5'-P-CCNC: 16 U/L (ref 0–50)
ANION GAP SERPL CALCULATED.3IONS-SCNC: 10 MMOL/L (ref 7–15)
AST SERPL W P-5'-P-CCNC: 32 U/L (ref 0–45)
BASOPHILS # BLD AUTO: 0 10E3/UL (ref 0–0.2)
BASOPHILS NFR BLD AUTO: 1 %
BILIRUB SERPL-MCNC: 0.4 MG/DL
BUN SERPL-MCNC: 13.1 MG/DL (ref 6–20)
C TRACH DNA SPEC QL NAA+PROBE: NEGATIVE
CALCIUM SERPL-MCNC: 9.3 MG/DL (ref 8.6–10)
CHLORIDE SERPL-SCNC: 102 MMOL/L (ref 98–107)
CHOLEST SERPL-MCNC: 226 MG/DL
CLUE CELLS: PRESENT
CREAT SERPL-MCNC: 0.76 MG/DL (ref 0.51–0.95)
DEPRECATED HCO3 PLAS-SCNC: 26 MMOL/L (ref 22–29)
EGFRCR SERPLBLD CKD-EPI 2021: >90 ML/MIN/1.73M2
EOSINOPHIL # BLD AUTO: 0.2 10E3/UL (ref 0–0.7)
EOSINOPHIL NFR BLD AUTO: 5 %
ERYTHROCYTE [DISTWIDTH] IN BLOOD BY AUTOMATED COUNT: 12 % (ref 10–15)
FASTING STATUS PATIENT QL REPORTED: YES
GLUCOSE SERPL-MCNC: 83 MG/DL (ref 70–99)
HCT VFR BLD AUTO: 36.5 % (ref 35–47)
HCV AB SERPL QL IA: NONREACTIVE
HDLC SERPL-MCNC: 81 MG/DL
HGB BLD-MCNC: 12.6 G/DL (ref 11.7–15.7)
HIV 1+2 AB+HIV1 P24 AG SERPL QL IA: NONREACTIVE
IMM GRANULOCYTES # BLD: 0 10E3/UL
IMM GRANULOCYTES NFR BLD: 0 %
LDLC SERPL CALC-MCNC: 130 MG/DL
LYMPHOCYTES # BLD AUTO: 1.5 10E3/UL (ref 0.8–5.3)
LYMPHOCYTES NFR BLD AUTO: 41 %
MCH RBC QN AUTO: 35.5 PG (ref 26.5–33)
MCHC RBC AUTO-ENTMCNC: 34.5 G/DL (ref 31.5–36.5)
MCV RBC AUTO: 103 FL (ref 78–100)
MONOCYTES # BLD AUTO: 0.4 10E3/UL (ref 0–1.3)
MONOCYTES NFR BLD AUTO: 11 %
N GONORRHOEA DNA SPEC QL NAA+PROBE: NEGATIVE
NEUTROPHILS # BLD AUTO: 1.5 10E3/UL (ref 1.6–8.3)
NEUTROPHILS NFR BLD AUTO: 42 %
NONHDLC SERPL-MCNC: 145 MG/DL
PLATELET # BLD AUTO: 288 10E3/UL (ref 150–450)
POTASSIUM SERPL-SCNC: 4.1 MMOL/L (ref 3.4–5.3)
PROT SERPL-MCNC: 7.4 G/DL (ref 6.4–8.3)
RBC # BLD AUTO: 3.55 10E6/UL (ref 3.8–5.2)
SODIUM SERPL-SCNC: 138 MMOL/L (ref 135–145)
TRICHOMONAS, WET PREP: ABNORMAL
TRIGL SERPL-MCNC: 73 MG/DL
TSH SERPL DL<=0.005 MIU/L-ACNC: 0.89 UIU/ML (ref 0.3–4.2)
VIT B12 SERPL-MCNC: 540 PG/ML (ref 232–1245)
WBC # BLD AUTO: 3.6 10E3/UL (ref 4–11)
WBC'S/HIGH POWER FIELD, WET PREP: ABNORMAL
YEAST, WET PREP: PRESENT

## 2023-12-29 PROCEDURE — 86696 HERPES SIMPLEX TYPE 2 TEST: CPT | Performed by: NURSE PRACTITIONER

## 2023-12-29 PROCEDURE — 87389 HIV-1 AG W/HIV-1&-2 AB AG IA: CPT | Performed by: NURSE PRACTITIONER

## 2023-12-29 PROCEDURE — 36415 COLL VENOUS BLD VENIPUNCTURE: CPT | Performed by: NURSE PRACTITIONER

## 2023-12-29 PROCEDURE — 80053 COMPREHEN METABOLIC PANEL: CPT | Performed by: NURSE PRACTITIONER

## 2023-12-29 PROCEDURE — 87491 CHLMYD TRACH DNA AMP PROBE: CPT | Performed by: NURSE PRACTITIONER

## 2023-12-29 PROCEDURE — 90651 9VHPV VACCINE 2/3 DOSE IM: CPT | Performed by: NURSE PRACTITIONER

## 2023-12-29 PROCEDURE — 80061 LIPID PANEL: CPT | Performed by: NURSE PRACTITIONER

## 2023-12-29 PROCEDURE — 87624 HPV HI-RISK TYP POOLED RSLT: CPT | Performed by: NURSE PRACTITIONER

## 2023-12-29 PROCEDURE — 90471 IMMUNIZATION ADMIN: CPT | Performed by: NURSE PRACTITIONER

## 2023-12-29 PROCEDURE — 87210 SMEAR WET MOUNT SALINE/INK: CPT | Performed by: NURSE PRACTITIONER

## 2023-12-29 PROCEDURE — 86803 HEPATITIS C AB TEST: CPT | Performed by: NURSE PRACTITIONER

## 2023-12-29 PROCEDURE — 86695 HERPES SIMPLEX TYPE 1 TEST: CPT | Performed by: NURSE PRACTITIONER

## 2023-12-29 PROCEDURE — G0145 SCR C/V CYTO,THINLAYER,RESCR: HCPCS | Performed by: NURSE PRACTITIONER

## 2023-12-29 PROCEDURE — 82607 VITAMIN B-12: CPT | Performed by: NURSE PRACTITIONER

## 2023-12-29 PROCEDURE — 85025 COMPLETE CBC W/AUTO DIFF WBC: CPT | Performed by: NURSE PRACTITIONER

## 2023-12-29 PROCEDURE — 84443 ASSAY THYROID STIM HORMONE: CPT | Performed by: NURSE PRACTITIONER

## 2023-12-29 PROCEDURE — 86780 TREPONEMA PALLIDUM: CPT | Performed by: NURSE PRACTITIONER

## 2023-12-29 PROCEDURE — 87591 N.GONORRHOEAE DNA AMP PROB: CPT | Performed by: NURSE PRACTITIONER

## 2023-12-29 PROCEDURE — 99396 PREV VISIT EST AGE 40-64: CPT | Mod: 25 | Performed by: NURSE PRACTITIONER

## 2023-12-29 RX ORDER — FLUCONAZOLE 150 MG/1
150 TABLET ORAL
Qty: 3 TABLET | Refills: 0 | Status: SHIPPED | OUTPATIENT
Start: 2023-12-29 | End: 2024-01-05

## 2023-12-29 RX ORDER — METRONIDAZOLE 500 MG/1
500 TABLET ORAL 2 TIMES DAILY
Qty: 14 TABLET | Refills: 0 | Status: SHIPPED | OUTPATIENT
Start: 2023-12-29 | End: 2024-01-05

## 2023-12-29 ASSESSMENT — ENCOUNTER SYMPTOMS
SORE THROAT: 1
ARTHRALGIAS: 1
MYALGIAS: 0
CONSTIPATION: 0
DIZZINESS: 0
EYE PAIN: 1
MYALGIAS: 1
WEAKNESS: 0
HEADACHES: 1
PARESTHESIAS: 1
JOINT SWELLING: 0
NERVOUS/ANXIOUS: 0
PALPITATIONS: 0
JOINT SWELLING: 1
NERVOUS/ANXIOUS: 1
HEADACHES: 0
HEMATURIA: 1
DIARRHEA: 0
COUGH: 0
DYSURIA: 0
FREQUENCY: 1
DYSURIA: 1
CHILLS: 0
FEVER: 1
ABDOMINAL PAIN: 0
NAUSEA: 1
FEVER: 0
WEAKNESS: 1
PALPITATIONS: 1
CONSTIPATION: 1
SORE THROAT: 0
COUGH: 1
ABDOMINAL PAIN: 1
HEMATOCHEZIA: 0
CHILLS: 1
PARESTHESIAS: 0
HEMATOCHEZIA: 1
HEARTBURN: 1
DIZZINESS: 1
SHORTNESS OF BREATH: 0
DIARRHEA: 1
HEMATURIA: 0
SHORTNESS OF BREATH: 1
ARTHRALGIAS: 0
FREQUENCY: 0
NAUSEA: 0
EYE PAIN: 0

## 2023-12-29 ASSESSMENT — PATIENT HEALTH QUESTIONNAIRE - PHQ9
10. IF YOU CHECKED OFF ANY PROBLEMS, HOW DIFFICULT HAVE THESE PROBLEMS MADE IT FOR YOU TO DO YOUR WORK, TAKE CARE OF THINGS AT HOME, OR GET ALONG WITH OTHER PEOPLE: SOMEWHAT DIFFICULT
SUM OF ALL RESPONSES TO PHQ QUESTIONS 1-9: 5
SUM OF ALL RESPONSES TO PHQ QUESTIONS 1-9: 5

## 2023-12-29 NOTE — NURSING NOTE
"Chief Complaint   Patient presents with    Physical     fasting     initial /72   Pulse 87   Temp 98.7  F (37.1  C) (Oral)   Resp 14   Ht 1.702 m (5' 7\")   Wt 64 kg (141 lb)   SpO2 99%   BMI 22.08 kg/m   Estimated body mass index is 22.08 kg/m  as calculated from the following:    Height as of this encounter: 1.702 m (5' 7\").    Weight as of this encounter: 64 kg (141 lb)..  bp completed using cuff size regular  ROE TUCKER LPN  "

## 2023-12-29 NOTE — PROGRESS NOTES
SUBJECTIVE:   Humera is a 43 year old, presenting for the following:  Physical (fasting)        2023     8:22 AM   Additional Questions   Roomed by Melissa STUART       Healthy Habits:     Getting at least 3 servings of Calcium per day:  NO    Bi-annual eye exam:  Yes    Dental care twice a year:  NO    Sleep apnea or symptoms of sleep apnea:  None    Diet:  Regular (no restrictions)    Frequency of exercise:  2-3 days/week    Duration of exercise:  Less than 15 minutes    Taking medications regularly:  Yes    Medication side effects:  None    Additional concerns today:  No      Today's PHQ-9 Score:       2023     8:17 AM   PHQ-9 SCORE   PHQ-9 Total Score MyChart 5 (Mild depression)   PHQ-9 Total Score 5                       Social History     Tobacco Use    Smoking status: Never    Smokeless tobacco: Never   Substance Use Topics    Alcohol use: Never             2023     8:19 AM   Alcohol Use   Prescreen: >3 drinks/day or >7 drinks/week? No     Reviewed orders with patient.  Reviewed health maintenance and updated orders accordingly - Yes      Breast Cancer Screenin/29/2023     8:20 AM   Breast CA Risk Assessment (FHS-7)   Do you have a family history of breast, colon, or ovarian cancer? No / Unknown           Pertinent mammograms are reviewed under the imaging tab.    History of abnormal Pap smear: none     Reviewed and updated as needed this visit by clinical staff   Tobacco  Allergies  Meds  Problems  Med Hx  Surg Hx  Fam Hx          Reviewed and updated as needed this visit by Provider                     Review of Systems   Constitutional:  Negative for chills and fever.   HENT:  Negative for congestion, ear pain, hearing loss and sore throat.    Eyes:  Negative for pain and visual disturbance.   Respiratory:  Negative for cough and shortness of breath.    Cardiovascular:  Negative for chest pain, palpitations and peripheral edema.   Gastrointestinal:  Positive for  "heartburn. Negative for abdominal pain, constipation, diarrhea, hematochezia and nausea.   Genitourinary:  Negative for dysuria, frequency, genital sores, hematuria and urgency.   Musculoskeletal:  Negative for arthralgias, joint swelling and myalgias.   Skin:  Negative for rash.   Neurological:  Negative for dizziness, weakness, headaches and paresthesias.   Psychiatric/Behavioral:  Negative for mood changes. The patient is not nervous/anxious.      Omeprazole as needed     HPV today   Declined other immunizations        OBJECTIVE:   /72   Pulse 87   Temp 98.7  F (37.1  C) (Oral)   Resp 14   Ht 1.702 m (5' 7\")   Wt 64 kg (141 lb)   SpO2 99%   BMI 22.08 kg/m    Physical Exam  GENERAL:alert and no distress  EYES: Eyes grossly normal to inspection,and conjunctivae and sclerae normal  HENT: ear canals and TM's normal, nose and mouth without ulcers or lesions  NECK: no adenopathy, no asymmetry, masses, or scars and thyroid normal to palpation  RESP: lungs clear to auscultation - no rales, rhonchi or wheezes  BREAST: normal without masses, tenderness or nipple discharge and no palpable axillary masses or adenopathy  CV: regular rate and rhythm, normal S1 S2, no S3 or S4, no murmur, click or rub, no peripheral edema and peripheral pulses strong  ABDOMEN: soft, nontender, no hepatosplenomegaly, no masses and bowel sounds normal   (female): normal urethral meatus, vaginal mucosa pink, moist, well rugated, and normal cervix/adnexa/uterus without masses or discharge  MS: no gross musculoskeletal defects noted, no edema  SKIN: no suspicious lesions or rashes  NEURO: Normal strength and tone, mentation intact and speech normal  PSYCH: mentation appears normal, affect normal/bright    Diagnostic Test Results:  Labs reviewed in Epic  Lab  PAP    ASSESSMENT/PLAN:   (Z00.00) Routine general medical examination at a health care facility  (primary encounter diagnosis)  Comment:   Plan: Lipid panel reflex to direct LDL " Fasting,         Comprehensive metabolic panel (BMP + Alb, Alk         Phos, ALT, AST, Total. Bili, TP), TSH with free        T4 reflex, CBC with platelets and differential            (Z12.4) Cervical cancer screening  Comment:   Plan: Pap Screen with HPV - recommended age 30 - 65         years            (Z11.3) Screen for STD (sexually transmitted disease)  Comment: wet prep positive for BV and yeast - flagyl and diflucan sent in   Plan: NEISSERIA GONORRHOEA PCR, CHLAMYDIA TRACHOMATIS        PCR, Wet prep - Clinic Collect, Treponema Abs w        Reflex to RPR and Titer, Herpes Simplex Virus 1        and 2 IgG, HIV Antigen Antibody Combo,         Hepatitis C Screen Reflex to HCV RNA Quant and         Genotype                  COUNSELING:  Reviewed preventive health counseling, as reflected in patient instructions       Regular exercise       Healthy diet/nutrition       Immunizations  Vaccinated for: Human Papillomavirus           Osteoporosis prevention/bone health       Safe sex practices/STD prevention        She reports that she has never smoked. She has never used smokeless tobacco.        CHONG Abdalla [facility]  Answers submitted by the patient for this visit:  Patient Health Questionnaire (Submitted on 12/29/2023)  If you checked off any problems, how difficult have these problems made it for you to do your work, take care of things at home, or get along with other people?: Somewhat difficult  PHQ9 TOTAL SCORE: 5

## 2023-12-29 NOTE — NURSING NOTE
Prior to immunization administration, verified patients identity using patient s name and date of birth. Please see Immunization Activity for additional information.     Screening Questionnaire for Adult Immunization    Are you sick today?   No   Do you have allergies to medications, food, a vaccine component or latex?   No   Have you ever had a serious reaction after receiving a vaccination?   No   Do you have a long-term health problem with heart, lung, kidney, or metabolic disease (e.g., diabetes), asthma, a blood disorder, no spleen, complement component deficiency, a cochlear implant, or a spinal fluid leak?  Are you on long-term aspirin therapy?   No   Do you have cancer, leukemia, HIV/AIDS, or any other immune system problem?   No   Do you have a parent, brother, or sister with an immune system problem?   No   In the past 3 months, have you taken medications that affect  your immune system, such as prednisone, other steroids, or anticancer drugs; drugs for the treatment of rheumatoid arthritis, Crohn s disease, or psoriasis; or have you had radiation treatments?   No   Have you had a seizure, or a brain or other nervous system problem?   No   During the past year, have you received a transfusion of blood or blood    products, or been given immune (gamma) globulin or antiviral drug?   No   For women: Are you pregnant or is there a chance you could become       pregnant during the next month?   No   Have you received any vaccinations in the past 4 weeks?   No     Immunization questionnaire answers were all negative.      Patient instructed to remain in clinic for 15 minutes afterwards, and to report any adverse reactions.     Screening performed by Melissa Rodriguez LPN on 12/29/2023 at 9:23 AM.

## 2023-12-29 NOTE — LETTER
January 2, 2024      Humera CELENA Nas  [address]    [place] [address]        Dear ,    We are writing to inform you of your test results.    Lab are in acceptable limits     Cholesterol elevated - work on diet and exercise     White count a little low  Would recheck in 1-2 months to make sure regulates    Resulted Orders   NEISSERIA GONORRHOEA PCR   Result Value Ref Range    Neisseria gonorrhoeae Negative Negative      Comment:      Negative for N. gonorrhoeae rRNA by transcription mediated amplification. A negative result by transcription mediated amplification does not preclude the presence of C. trachomatis infection because results are dependent on proper and adequate collection, absence of inhibitors and sufficient rRNA to be detected.   CHLAMYDIA TRACHOMATIS PCR   Result Value Ref Range    Chlamydia trachomatis Negative Negative      Comment:      A negative result by transcription mediated amplification does not preclude the presence of C. trachomatis infection because results are dependent on proper and adequate collection, absence of inhibitors and sufficient rRNA to be detected.   Wet prep - Clinic Collect   Result Value Ref Range    Trichomonas Absent Absent    Yeast Present (A) Absent    Clue Cells Present (A) Absent    WBCs/high power field None None   Lipid panel reflex to direct LDL Fasting   Result Value Ref Range    Cholesterol 226 (H) <200 mg/dL    Triglycerides 73 <150 mg/dL    Direct Measure HDL 81 >=50 mg/dL    LDL Cholesterol Calculated 130 (H) <=100 mg/dL    Non HDL Cholesterol 145 (H) <130 mg/dL    Patient Fasting > 8hrs? Yes     Narrative    Cholesterol  Desirable:  <200 mg/dL    Triglycerides  Normal:  Less than 150 mg/dL  Borderline High:  150-199 mg/dL  High:  200-499 mg/dL  Very High:  Greater than or equal to 500 mg/dL    Direct Measure HDL  Female:  Greater than or equal to 50 mg/dL   Male:  Greater than or equal to 40 mg/dL    LDL Cholesterol  Desirable:   <100mg/dL  Above Desirable:  100-129 mg/dL   Borderline High:  130-159 mg/dL   High:  160-189 mg/dL   Very High:  >= 190 mg/dL    Non HDL Cholesterol  Desirable:  130 mg/dL  Above Desirable:  130-159 mg/dL  Borderline High:  160-189 mg/dL  High:  190-219 mg/dL  Very High:  Greater than or equal to 220 mg/dL   Comprehensive metabolic panel (BMP + Alb, Alk Phos, ALT, AST, Total. Bili, TP)   Result Value Ref Range    Sodium 138 135 - 145 mmol/L      Comment:      Reference intervals for this test were updated on 09/26/2023 to more accurately reflect our healthy population. There may be differences in the flagging of prior results with similar values performed with this method. Interpretation of those prior results can be made in the context of the updated reference intervals.     Potassium 4.1 3.4 - 5.3 mmol/L    Carbon Dioxide (CO2) 26 22 - 29 mmol/L    Anion Gap 10 7 - 15 mmol/L    Urea Nitrogen 13.1 6.0 - 20.0 mg/dL    Creatinine 0.76 0.51 - 0.95 mg/dL    GFR Estimate >90 >60 mL/min/1.73m2    Calcium 9.3 8.6 - 10.0 mg/dL    Chloride 102 98 - 107 mmol/L    Glucose 83 70 - 99 mg/dL    Alkaline Phosphatase 59 40 - 150 U/L      Comment:      Reference intervals for this test were updated on 11/14/2023 to more accurately reflect our healthy population. There may be differences in the flagging of prior results with similar values performed with this method. Interpretation of those prior results can be made in the context of the updated reference intervals.    AST 32 0 - 45 U/L      Comment:      Reference intervals for this test were updated on 6/12/2023 to more accurately reflect our healthy population. There may be differences in the flagging of prior results with similar values performed with this method. Interpretation of those prior results can be made in the context of the updated reference intervals.    ALT 16 0 - 50 U/L      Comment:      Reference intervals for this test were updated on 6/12/2023 to more  accurately reflect our healthy population. There may be differences in the flagging of prior results with similar values performed with this method. Interpretation of those prior results can be made in the context of the updated reference intervals.      Protein Total 7.4 6.4 - 8.3 g/dL    Albumin 4.3 3.5 - 5.2 g/dL    Bilirubin Total 0.4 <=1.2 mg/dL   TSH with free T4 reflex   Result Value Ref Range    TSH 0.89 0.30 - 4.20 uIU/mL   Treponema Abs w Reflex to RPR and Titer   Result Value Ref Range    Treponema Antibody Total Nonreactive Nonreactive   HIV Antigen Antibody Combo   Result Value Ref Range    HIV Antigen Antibody Combo Nonreactive Nonreactive      Comment:      Negative HIV-1/-2 antigen and antibody screening test results usually indicate the absence of HIV-1 and HIV-2 infection. However, such negative results do not rule-out acute HIV infection.  If acute HIV-1 or HIV-2 infection is suspected, detection of HIV-1 or HIV-2 RNA  is recommended.    Hepatitis C Screen Reflex to HCV RNA Quant and Genotype   Result Value Ref Range    Hepatitis C Antibody Nonreactive Nonreactive      Comment:      A nonreactive screening test result does not exclude the possibility of exposure to or infection with HCV. Nonreactive screening test results in individuals with prior exposure to HCV may be due to antibody levels below the limit of detection of this assay or lack of reactivity to the HCV antigens used in this assay. Patients with recent HCV infections (<3 months from time of exposure) may have false-negative HCV antibody results due to the time needed for seroconversion (average of 8 to 9 weeks).   CBC with platelets and differential   Result Value Ref Range    WBC Count 3.6 (L) 4.0 - 11.0 10e3/uL    RBC Count 3.55 (L) 3.80 - 5.20 10e6/uL    Hemoglobin 12.6 11.7 - 15.7 g/dL    Hematocrit 36.5 35.0 - 47.0 %     (H) 78 - 100 fL    MCH 35.5 (H) 26.5 - 33.0 pg    MCHC 34.5 31.5 - 36.5 g/dL    RDW 12.0 10.0 - 15.0 %     Platelet Count 288 150 - 450 10e3/uL    % Neutrophils 42 %    % Lymphocytes 41 %    % Monocytes 11 %    % Eosinophils 5 %    % Basophils 1 %    % Immature Granulocytes 0 %    Absolute Neutrophils 1.5 (L) 1.6 - 8.3 10e3/uL    Absolute Lymphocytes 1.5 0.8 - 5.3 10e3/uL    Absolute Monocytes 0.4 0.0 - 1.3 10e3/uL    Absolute Eosinophils 0.2 0.0 - 0.7 10e3/uL    Absolute Basophils 0.0 0.0 - 0.2 10e3/uL    Absolute Immature Granulocytes 0.0 <=0.4 10e3/uL   Vitamin B12   Result Value Ref Range    Vitamin B12 540 232 - 1,245 pg/mL       If you have any questions or concerns, please call the clinic at the number listed above.       Sincerely,      CHONG Abdalla CNP

## 2023-12-30 LAB — T PALLIDUM AB SER QL: NONREACTIVE

## 2024-01-02 DIAGNOSIS — D72.818 OTHER DECREASED WHITE BLOOD CELL COUNT: Primary | ICD-10-CM

## 2024-01-02 LAB
HSV1 IGG SERPL QL IA: 37.7 INDEX
HSV1 IGG SERPL QL IA: ABNORMAL
HSV2 IGG SERPL QL IA: 0.8 INDEX
HSV2 IGG SERPL QL IA: ABNORMAL

## 2024-01-04 LAB
BKR LAB AP GYN ADEQUACY: NORMAL
BKR LAB AP GYN INTERPRETATION: NORMAL
BKR LAB AP HPV REFLEX: NORMAL
BKR LAB AP PREVIOUS ABNORMAL: NORMAL
PATH REPORT.COMMENTS IMP SPEC: NORMAL
PATH REPORT.COMMENTS IMP SPEC: NORMAL
PATH REPORT.RELEVANT HX SPEC: NORMAL

## 2024-01-08 PROBLEM — R87.810 CERVICAL HIGH RISK HPV (HUMAN PAPILLOMAVIRUS) TEST POSITIVE: Status: ACTIVE | Noted: 2018-06-03

## 2024-01-08 LAB
HUMAN PAPILLOMA VIRUS 16 DNA: NEGATIVE
HUMAN PAPILLOMA VIRUS 18 DNA: NEGATIVE
HUMAN PAPILLOMA VIRUS FINAL DIAGNOSIS: NORMAL
HUMAN PAPILLOMA VIRUS OTHER HR: NEGATIVE

## 2024-01-10 ENCOUNTER — PATIENT OUTREACH (OUTPATIENT)
Dept: INTERNAL MEDICINE | Facility: CLINIC | Age: 44
End: 2024-01-10
Payer: COMMERCIAL

## 2024-02-01 ENCOUNTER — TELEPHONE (OUTPATIENT)
Dept: GASTROENTEROLOGY | Facility: CLINIC | Age: 44
End: 2024-02-01
Payer: COMMERCIAL

## 2024-02-01 NOTE — TELEPHONE ENCOUNTER
Called Humera to follow-up on treatment of H. Pylori. She never started treatment. Agreed to follow-up visit with me to discuss on 2/16/24.    Flaco Carcamo, PharmD, BCPS  MTM Pharmacist   [facility]  Phone: [phone]

## 2024-02-16 ENCOUNTER — VIRTUAL VISIT (OUTPATIENT)
Dept: GASTROENTEROLOGY | Facility: CLINIC | Age: 44
End: 2024-02-16
Attending: INTERNAL MEDICINE
Payer: COMMERCIAL

## 2024-02-16 DIAGNOSIS — A04.8 BACTERIAL INFECTION DUE TO H. PYLORI: Primary | ICD-10-CM

## 2024-02-16 RX ORDER — OMEPRAZOLE 40 MG/1
40 CAPSULE, DELAYED RELEASE ORAL 2 TIMES DAILY
Qty: 28 CAPSULE | Refills: 0 | Status: SHIPPED | OUTPATIENT
Start: 2024-02-16 | End: 2024-02-27

## 2024-02-16 RX ORDER — BISMUTH SUBCITRATE POTASSIUM, METRONIDAZOLE, TETRACYCLINE HYDROCHLORIDE 140; 125; 125 MG/1; MG/1; MG/1
3 CAPSULE ORAL
Qty: 168 CAPSULE | Refills: 0 | Status: SHIPPED | OUTPATIENT
Start: 2024-02-16 | End: 2024-02-27

## 2024-02-16 NOTE — PROGRESS NOTES
Medication Therapy Management (MTM) Encounter    ASSESSMENT:                            Medication Adherence/Access: No issues identified    H. Pylori:  Humera would benefit from first-line for treatment of H. Pylori. Given local clarithromycin resistance rates, I recommend treatment with bismuth quadruple therapy x 10 days. No adjustments for renal or hepatic function required. No clinically significant drug-drug interactions present. They will be due for eradication testing no sooner than 4 weeks after completion of their antibiotics. They should not take PPIs for at least 1-2 weeks prior to eradication testing.    PLAN:                            I recommend treatment with bismuth quadruple therapy x 10 days:  Pylera 140-125-125 mg Take 3 capsules by mouth four times daily  Omeprazole 40 mg Take 1 capsule by mouth twice daily 30-60 minutes before meals  Do not take any multivitamins or dairy while on these antibiotics as they can make the tetracycline less effective.   You will be due for stool testing to make sure the H. Pylori is gone no sooner than 4 weeks after completion of your antibiotics. You must be off PPIs such as omeprazole for at least 1-2 weeks prior to eradication testing.     Follow-up: 3/1/2024 11:30 AM    SUBJECTIVE/OBJECTIVE:                          Humera Lovell is a 43 year old female called for a follow-up visit from 10/12/2023.       Reason for visit: H. Pylori follow-up visit    Allergies/ADRs: Reviewed in chart  Past Medical History: Reviewed in chart  Tobacco: She reports that she has never smoked. She has never used smokeless tobacco.  Alcohol: 1-3 beverages / week      Medication Adherence/Access: no issues reported    H. Pylori:   Pylera 140-125-125 mg 3 capsules four times daily (not started)  Omeprazole 40 mg twice daily (not started)    I had originally met with Humera in October 2023 to discuss treatment of H. Pylori. I recommended bismuth quadruple therapy however this was never  started. She recently tried to  the prescriptions and [facility] told her that we need to send another prescription. New rx sent with this encounter with new duration of therapy for 10 days.    Diagnosis by stool antigen test on 10/2/2023  Previous treatment regimens: none  Previous Macrolide exposure: not on file  Penicillin Allergy: NKDA  Pregnant/breastfeeding: No   KIDNEY: No known chronic kidney disease  LIVER: No known chronic liver disease  Drug-drug interactions: No dairy, multivitamins or antacids     GFR Estimate   Date Value Ref Range Status   12/29/2023 >90 >60 mL/min/1.73m2 Final   03/09/2021 >90 >60 mL/min/[1.73_m2] Final     Comment:     Non  GFR Calc  Starting 12/18/2018, serum creatinine based estimated GFR (eGFR) will be   calculated using the Chronic Kidney Disease Epidemiology Collaboration   (CKD-EPI) equation.       GFR, ESTIMATED POCT   Date Value Ref Range Status   09/04/2022 >60 >60 mL/min/1.73m2 Final     GFR Estimate If Black   Date Value Ref Range Status   03/09/2021 >90 >60 mL/min/[1.73_m2] Final     Comment:      GFR Calc  Starting 12/18/2018, serum creatinine based estimated GFR (eGFR) will be   calculated using the Chronic Kidney Disease Epidemiology Collaboration   (CKD-EPI) equation.         ----------------      I spent 30 minutes with this patient today. All changes were made via collaborative practice agreement with Keaton Breaux MD. A copy of the visit note was provided to the patient's provider(s).    A summary of these recommendations was sent via [employer].    José Miguel Carcamo PharmD, BCPS  MTM Pharmacist   [facility]  Phone: [phone]    Telemedicine Visit Details  Type of service:  Telephone visit  Start Time:  9:00 AM  End Time:  9:30 AM     Medication Therapy Recommendations  No medication therapy recommendations to display

## 2024-02-16 NOTE — Clinical Note
2/16/2024         RE: Humera Lovell  [address]  [address]  [place] [address]        Dear Colleague,    Thank you for referring your patient, Humera Lovell, to the [facility]. Please see a copy of my visit note below.    Medication Therapy Management (MTM) Encounter    ASSESSMENT:                            Medication Adherence/Access: {adherencechoices:729157}    H. Pylori:  ***      PLAN:                            ***    Follow-up: {followuptest2:146231}    SUBJECTIVE/OBJECTIVE:                          Humera Lovell is a 43 year old female { :565219} for {mtmvisit:307017}     Reason for visit: H. Pylori.    Allergies/ADRs: {1/2/3/4/5:012040}  Past Medical History: {1/2/3/4/5:678685}  Tobacco: She reports that she has never smoked. She has never used smokeless tobacco.  Alcohol: {ALCOHOL CONSUMPTION HX:756382}  {Social and Goals:268430}    Medication Adherence/Access: {fumedadherence:804285}    H. Pylori:   Pylera 140-120-120 3 capsules four times daily  Omeprazole 40 mg twice daily     [facility] told her that we need to send another prescription.       Diagnosis by stool antigen test on 10/2/2023  Previous treatment regimens: none  Previous Macrolide exposure: not on file  Penicillin Allergy: NKDA  Pregnant/breastfeeding: No   KIDNEY: No known chronic kidney disease  LIVER: No known chronic liver disease  Drug-drug interactions: No dairy, multivitamins or antacids     GFR Estimate   Date Value Ref Range Status   12/29/2023 >90 >60 mL/min/1.73m2 Final   03/09/2021 >90 >60 mL/min/[1.73_m2] Final     Comment:     Non  GFR Calc  Starting 12/18/2018, serum creatinine based estimated GFR (eGFR) will be   calculated using the Chronic Kidney Disease Epidemiology Collaboration   (CKD-EPI) equation.       GFR, ESTIMATED POCT   Date Value Ref Range Status   09/04/2022 >60 >60 mL/min/1.73m2 Final     GFR Estimate If Black   Date Value Ref Range Status   03/09/2021 >90  ">60 mL/min/[1.73_m2] Final     Comment:      GFR Calc  Starting 12/18/2018, serum creatinine based estimated GFR (eGFR) will be   calculated using the Chronic Kidney Disease Epidemiology Collaboration   (CKD-EPI) equation.         Today's Vitals: There were no vitals taken for this visit.  ----------------  {ALINE?:949703}    I spent {mtm total time 3:621147} with this patient today. { :321898}. A copy of the visit note was provided to the patient's provider(s).    A summary of these recommendations {GIVEN/NOT GIVEN:555027}.    ***    Telemedicine Visit Details  Type of service:  {telemedvisitmtm:660891::\"Telephone visit\"}  Start Time: {video/phone visit start time:152948}  End Time: {video/phone visit end time:152948}     Medication Therapy Recommendations  No medication therapy recommendations to display       Medication Therapy Management (MTM) Encounter    ASSESSMENT:                            Medication Adherence/Access: No issues identified    H. Pylori:  Humera would benefit from first-line for treatment of H. Pylori. Given local clarithromycin resistance rates, I recommend treatment with bismuth quadruple therapy x 14 days. No adjustments for renal or hepatic function required. No clinically significant drug-drug interactions present. They will be due for eradication testing no sooner than 4 weeks after completion of their antibiotics. They should not take PPIs for at least 1-2 weeks prior to eradication testing.    PLAN:                            I recommend treatment with bismuth quadruple therapy x 14 days:  Pylera 140-125-125 mg Take 3 capsules by mouth four times daily  Omeprazole 40 mg Take 1 capsule by mouth twice daily 30-60 minutes before meals  Do not take any multivitamins or dairy while on these antibiotics as they can make the tetracycline less effective.   You will be due for stool testing to make sure the H. Pylori is gone no sooner than 4 weeks after completion of your " antibiotics. You must be off PPIs such as omeprazole for at least 1-2 weeks prior to eradication testing.     Follow-up: 3/1/2024 11:30 AM    SUBJECTIVE/OBJECTIVE:                          Humera Lovell is a 43 year old female called for a follow-up visit from 10/12/2023.       Reason for visit: H. Pylori follow-up visit    Allergies/ADRs: Reviewed in chart  Past Medical History: Reviewed in chart  Tobacco: She reports that she has never smoked. She has never used smokeless tobacco.  Alcohol: 1-3 beverages / week      Medication Adherence/Access: no issues reported    H. Pylori:   Pylera 140-120-120 3 capsules four times daily  Omeprazole 40 mg twice daily     I had originally met with Humera in October 2023 to discuss treatment of H. Pylori. I recommended bismuth quadruple therapy however this was never started. She recently tried to  the prescriptions and [facility] told her that we need to send another prescription. New rx sent with this encounter with new duration of therapy for 14 days.    Diagnosis by stool antigen test on 10/2/2023  Previous treatment regimens: none  Previous Macrolide exposure: not on file  Penicillin Allergy: NKDA  Pregnant/breastfeeding: No   KIDNEY: No known chronic kidney disease  LIVER: No known chronic liver disease  Drug-drug interactions: No dairy, multivitamins or antacids     GFR Estimate   Date Value Ref Range Status   12/29/2023 >90 >60 mL/min/1.73m2 Final   03/09/2021 >90 >60 mL/min/[1.73_m2] Final     Comment:     Non  GFR Calc  Starting 12/18/2018, serum creatinine based estimated GFR (eGFR) will be   calculated using the Chronic Kidney Disease Epidemiology Collaboration   (CKD-EPI) equation.       GFR, ESTIMATED POCT   Date Value Ref Range Status   09/04/2022 >60 >60 mL/min/1.73m2 Final     GFR Estimate If Black   Date Value Ref Range Status   03/09/2021 >90 >60 mL/min/[1.73_m2] Final     Comment:      GFR Calc  Starting 12/18/2018, serum  creatinine based estimated GFR (eGFR) will be   calculated using the Chronic Kidney Disease Epidemiology Collaboration   (CKD-EPI) equation.         ----------------      I spent 30 minutes with this patient today. All changes were made via collaborative practice agreement with Keaton Breaux MD. A copy of the visit note was provided to the patient's provider(s).    A summary of these recommendations was sent via [employer].    Jazmin SchneiderD, BCPS  [facility] Pharmacist   [facility]  Phone: [phone]    Telemedicine Visit Details  Type of service:  Telephone visit  Start Time:  9:00 AM  End Time:  9:30 AM     Medication Therapy Recommendations  No medication therapy recommendations to display         Again, thank you for allowing me to participate in the care of your patient.        Sincerely,        José Miguel Carcamo RPH

## 2024-02-19 NOTE — PATIENT INSTRUCTIONS
"Recommendations from today's MTM visit:                                                      I recommend treatment with bismuth quadruple therapy x 14 days:  Pylera 140-125-125 mg Take 3 capsules by mouth four times daily  Omeprazole 40 mg Take 1 capsule by mouth twice daily 30-60 minutes before meals  Do not take any multivitamins or dairy while on these antibiotics as they can make the tetracycline less effective.   You will be due for stool testing to make sure the H. Pylori is gone no sooner than 4 weeks after completion of your antibiotics. You must be off PPIs such as omeprazole for at least 1-2 weeks prior to eradication testing.     Follow-up: 3/1/2024 11:30 AM    It was great speaking with you today.  I value your experience and would be very thankful for your time in providing feedback in our clinic survey. In the next few days, you may receive an email or text message from [employer] with a link to a survey related to your  clinical pharmacist.\"     To schedule another MTM appointment, please call the clinic directly or you may call the MTM scheduling line at [phone] or toll-free at [phone].     My Clinical Pharmacist's contact information:                                                      Please feel free to contact me with any questions or concerns you have.      José Miguel Carcamo, PharmD, BCPS  MTM Pharmacist   [facility]  Phone: [phone]   "

## 2024-02-27 RX ORDER — OMEPRAZOLE 40 MG/1
40 CAPSULE, DELAYED RELEASE ORAL 2 TIMES DAILY
Qty: 20 CAPSULE | Refills: 0 | Status: SHIPPED | OUTPATIENT
Start: 2024-02-27 | End: 2024-06-28

## 2024-02-27 RX ORDER — BISMUTH SUBCITRATE POTASSIUM, METRONIDAZOLE, TETRACYCLINE HYDROCHLORIDE 140; 125; 125 MG/1; MG/1; MG/1
3 CAPSULE ORAL
Qty: 120 CAPSULE | Refills: 0 | Status: SHIPPED | OUTPATIENT
Start: 2024-02-27 | End: 2024-06-28

## 2024-03-01 ENCOUNTER — VIRTUAL VISIT (OUTPATIENT)
Dept: GASTROENTEROLOGY | Facility: CLINIC | Age: 44
End: 2024-03-01
Attending: INTERNAL MEDICINE
Payer: COMMERCIAL

## 2024-03-01 DIAGNOSIS — A04.8 BACTERIAL INFECTION DUE TO H. PYLORI: Primary | ICD-10-CM

## 2024-03-01 NOTE — Clinical Note
3/1/2024         RE: Humera Lovell  [address]  [address]  [place] [address]        Dear Colleague,    Thank you for referring your patient, Humera Lovell, to the [facility]. Please see a copy of my visit note below.    Medication Therapy Management (MTM) Encounter    ASSESSMENT:                            Medication Adherence/Access: {adherencechoices:807810}    H. Pylori:  ***      PLAN:                            ***    Follow-up: {followuptest2:301757}    SUBJECTIVE/OBJECTIVE:                          Humera Lovell is a 43 year old female { :334627} for {mtmvisit:173113}     Reason for visit: ***.    Allergies/ADRs: {1/2/3/4/5:881713}  Past Medical History: {1/2/3/4/5:660212}  Tobacco: She reports that she has never smoked. She has never used smokeless tobacco.  Alcohol: {ALCOHOL CONSUMPTION HX:640561}  {Social and Goals:544237}    Medication Adherence/Access: {fumedadherence:228284}    H. Pylori:   ***    Diagnosis by stomach biopsy on ***  Previous treatment regimens:  Previous Macrolide exposure:  Penicillin Allergy:  Pregnant/breastfeeding:   KIDNEY: No known chronic kidney disease  LIVER: No known chronic liver disease  Drug-drug interactions: No dairy, multivitamins or antacids     GFR Estimate   Date Value Ref Range Status   12/29/2023 >90 >60 mL/min/1.73m2 Final   03/09/2021 >90 >60 mL/min/[1.73_m2] Final     Comment:     Non  GFR Calc  Starting 12/18/2018, serum creatinine based estimated GFR (eGFR) will be   calculated using the Chronic Kidney Disease Epidemiology Collaboration   (CKD-EPI) equation.       GFR, ESTIMATED POCT   Date Value Ref Range Status   09/04/2022 >60 >60 mL/min/1.73m2 Final     GFR Estimate If Black   Date Value Ref Range Status   03/09/2021 >90 >60 mL/min/[1.73_m2] Final     Comment:      GFR Calc  Starting 12/18/2018, serum creatinine based estimated GFR (eGFR) will be   calculated using the Chronic Kidney  "Disease Epidemiology Collaboration   (CKD-EPI) equation.           ----------------  {ALINE?:447746}    I spent {mtm total time 3:083766} with this patient today. { :006798}. A copy of the visit note was provided to the patient's provider(s).    A summary of these recommendations {GIVEN/NOT GIVEN:457431}.    ***    Telemedicine Visit Details  Type of service:  {telemedvisitmtm:954697::\"Telephone visit\"}  Start Time: {video/phone visit start time:152948}  End Time: {video/phone visit end time:152948}     Medication Therapy Recommendations  No medication therapy recommendations to display       Medication Therapy Management (MTM) Encounter    ASSESSMENT:                            Medication Adherence/Access: No issues identified    H. Pylori:  Humera would benefit from first-line for treatment of H. Pylori. Given local clarithromycin resistance rates, I recommend treatment with bismuth quadruple therapy x 10 days. No adjustments for renal or hepatic function required. No clinically significant drug-drug interactions present. They will be due for eradication testing no sooner than 4 weeks after completion of their antibiotics. They should not take PPIs for at least 1-2 weeks prior to eradication testing.     PLAN:                            I recommend treatment with bismuth quadruple therapy x 10 days:  Pylera 140-125-125 mg Take 3 capsules by mouth four times daily  Omeprazole 40 mg Take 1 capsule by mouth twice daily 30-60 minutes before meals  Do not take any multivitamins or dairy while on these antibiotics as they can make the tetracycline less effective.   You will be due for stool testing to make sure the H. Pylori is gone no sooner than 4 weeks after completion of your antibiotics. You must be off PPIs such as omeprazole for at least 1-2 weeks prior to eradication testing.     Follow-up: 3/15/2024 at 10:30 AM    SUBJECTIVE/OBJECTIVE:                          Humera Lovell is a 43 year old female called for a " follow-up visit from 2/16/2024.       Reason for visit: H. Pylori follow-up visit.    Allergies/ADRs: Reviewed in chart  Past Medical History: Reviewed in chart  Tobacco: She reports that she has never smoked. She has never used smokeless tobacco.  Alcohol: 1-3 beverages / week      Medication Adherence/Access: no issues reported    H. Pylori:   Pylera 140-125-125 mg 3 capsules four times daily (not started)  Omeprazole 40 mg twice daily (not started)    We ended up sending a prescription for 10 days of therapy because her insurance only covers this. She has not yet started medications but plans to pick them up soon.     Diagnosis by stomach biopsy on 10/2/2023  Previous treatment regimens: none  Previous Macrolide exposure: not on file  Penicillin Allergy: NKDA  Pregnant/breastfeeding: No   KIDNEY: No known chronic kidney disease  LIVER: No known chronic liver disease  Drug-drug interactions: No dairy, multivitamins or antacids     GFR Estimate   Date Value Ref Range Status   12/29/2023 >90 >60 mL/min/1.73m2 Final   03/09/2021 >90 >60 mL/min/[1.73_m2] Final     Comment:     Non  GFR Calc  Starting 12/18/2018, serum creatinine based estimated GFR (eGFR) will be   calculated using the Chronic Kidney Disease Epidemiology Collaboration   (CKD-EPI) equation.       GFR, ESTIMATED POCT   Date Value Ref Range Status   09/04/2022 >60 >60 mL/min/1.73m2 Final     GFR Estimate If Black   Date Value Ref Range Status   03/09/2021 >90 >60 mL/min/[1.73_m2] Final     Comment:      GFR Calc  Starting 12/18/2018, serum creatinine based estimated GFR (eGFR) will be   calculated using the Chronic Kidney Disease Epidemiology Collaboration   (CKD-EPI) equation.           ----------------      I spent 10 minutes with this patient today. All changes were made via collaborative practice agreement with Keaton Breaux MD. A copy of the visit note was provided to the patient's provider(s).    A summary of these  recommendations was sent via [employer].    José Miguel Carcamo PharmD, BCPS  MTM Pharmacist   [facility]  Phone: [phone]    Telemedicine Visit Details  Type of service:  Telephone visit  Start Time:  11:30 AM  End Time:  11:40 AM     Medication Therapy Recommendations  No medication therapy recommendations to display         Again, thank you for allowing me to participate in the care of your patient.        Sincerely,        José Miguel Carcamo RPH

## 2024-03-01 NOTE — PROGRESS NOTES
Medication Therapy Management (MTM) Encounter    ASSESSMENT:                            Medication Adherence/Access: No issues identified    H. Pylori:  Humera would benefit from first-line for treatment of H. Pylori. Given local clarithromycin resistance rates, I recommend treatment with bismuth quadruple therapy x 10 days. No adjustments for renal or hepatic function required. No clinically significant drug-drug interactions present. They will be due for eradication testing no sooner than 4 weeks after completion of their antibiotics. They should not take PPIs for at least 1-2 weeks prior to eradication testing.     PLAN:                            I recommend treatment with bismuth quadruple therapy x 10 days:  Pylera 140-125-125 mg Take 3 capsules by mouth four times daily  Omeprazole 40 mg Take 1 capsule by mouth twice daily 30-60 minutes before meals  Do not take any multivitamins or dairy while on these antibiotics as they can make the tetracycline less effective.   You will be due for stool testing to make sure the H. Pylori is gone no sooner than 4 weeks after completion of your antibiotics. You must be off PPIs such as omeprazole for at least 1-2 weeks prior to eradication testing.     Follow-up: 3/15/2024 at 10:30 AM    SUBJECTIVE/OBJECTIVE:                          Humera Lovell is a 43 year old female called for a follow-up visit from 2/16/2024.       Reason for visit: H. Pylori follow-up visit.    Allergies/ADRs: Reviewed in chart  Past Medical History: Reviewed in chart  Tobacco: She reports that she has never smoked. She has never used smokeless tobacco.  Alcohol: 1-3 beverages / week      Medication Adherence/Access: no issues reported    H. Pylori:   Pylera 140-125-125 mg 3 capsules four times daily (not started)  Omeprazole 40 mg twice daily (not started)    We ended up sending a prescription for 10 days of therapy because her insurance only covers this. She has not yet started medications but plans  to pick them up soon.     Diagnosis by stomach biopsy on 10/2/2023  Previous treatment regimens: none  Previous Macrolide exposure: not on file  Penicillin Allergy: NKDA  Pregnant/breastfeeding: No   KIDNEY: No known chronic kidney disease  LIVER: No known chronic liver disease  Drug-drug interactions: No dairy, multivitamins or antacids     GFR Estimate   Date Value Ref Range Status   12/29/2023 >90 >60 mL/min/1.73m2 Final   03/09/2021 >90 >60 mL/min/[1.73_m2] Final     Comment:     Non  GFR Calc  Starting 12/18/2018, serum creatinine based estimated GFR (eGFR) will be   calculated using the Chronic Kidney Disease Epidemiology Collaboration   (CKD-EPI) equation.       GFR, ESTIMATED POCT   Date Value Ref Range Status   09/04/2022 >60 >60 mL/min/1.73m2 Final     GFR Estimate If Black   Date Value Ref Range Status   03/09/2021 >90 >60 mL/min/[1.73_m2] Final     Comment:      GFR Calc  Starting 12/18/2018, serum creatinine based estimated GFR (eGFR) will be   calculated using the Chronic Kidney Disease Epidemiology Collaboration   (CKD-EPI) equation.           ----------------      I spent 10 minutes with this patient today. All changes were made via collaborative practice agreement with Keaton Breaux MD. A copy of the visit note was provided to the patient's provider(s).    A summary of these recommendations was sent via [employer].    José Miguel Carcamo, PharmD, BCPS  MTM Pharmacist   [facility]  Phone: [phone]    Telemedicine Visit Details  Type of service:  Telephone visit  Start Time:  11:30 AM  End Time:  11:40 AM     Medication Therapy Recommendations  No medication therapy recommendations to display

## 2024-03-11 ENCOUNTER — HOSPITAL ENCOUNTER (OUTPATIENT)
Facility: CLINIC | Age: 44
End: 2024-03-11
Attending: STUDENT IN AN ORGANIZED HEALTH CARE EDUCATION/TRAINING PROGRAM | Admitting: STUDENT IN AN ORGANIZED HEALTH CARE EDUCATION/TRAINING PROGRAM
Payer: COMMERCIAL

## 2024-03-11 ENCOUNTER — TELEPHONE (OUTPATIENT)
Dept: GASTROENTEROLOGY | Facility: CLINIC | Age: 44
End: 2024-03-11
Payer: COMMERCIAL

## 2024-03-11 DIAGNOSIS — R11.15 CYCLIC VOMITING SYNDROME: Primary | ICD-10-CM

## 2024-03-11 RX ORDER — BISACODYL 5 MG/1
TABLET, DELAYED RELEASE ORAL
Qty: 4 TABLET | Refills: 0 | Status: SHIPPED | OUTPATIENT
Start: 2024-03-11 | End: 2024-06-28

## 2024-03-11 NOTE — TELEPHONE ENCOUNTER
"Endoscopy Scheduling Screen    Have you had a positive Covid test in the last 14 days?  No    What is your communication preference for Instructions and/or Bowel Prep?   MyChart    What insurance is in the chart?  Other:  [facility]    Ordering/Referring Provider:   SITA SKY        (If ordering provider performs procedure, schedule with ordering provider unless otherwise instructed. )    BMI: Estimated body mass index is 22.08 kg/m  as calculated from the following:    Height as of 12/29/23: 1.702 m (5' 7\").    Weight as of 12/29/23: 64 kg (141 lb).     Sedation Ordered  moderate sedation.   If patient BMI > 50 do not schedule in ASC.    If patient BMI > 45 do not schedule at [facility].    Are you taking methadone or Suboxone?  No    Have you had difficulties, pain, or discomfort during past endoscopy procedures?  no    Are you taking any prescription medications for pain 3 or more times per week?   NO, No RN review required.    Do you have a history of malignant hyperthermia?  No    (Females) Are you currently pregnant?   No     Have you been diagnosed or told you have pulmonary hypertension?   No    Do you have an LVAD?  No    Have you been told you have moderate to severe sleep apnea?  No    Have you been told you have COPD, asthma, or any other lung disease?  No    Do you have any heart conditions?  No     Have you ever had or are you waiting for an organ transplant?  No. Continue scheduling, no site restrictions.    Have you had a stroke or transient ischemic attack (TIA aka \"mini stroke\" in the last 6 months?   No    Have you been diagnosed with or been told you have cirrhosis of the liver?   No    Are you currently on dialysis?   No    Do you need assistance transferring?   No    BMI: Estimated body mass index is 22.08 kg/m  as calculated from the following:    Height as of 12/29/23: 1.702 m (5' 7\").    Weight as of 12/29/23: 64 kg (141 lb).     Is patients BMI > 40 and scheduling location [facility]?  No    Do " you take an injectable medication for weight loss or diabetes (excluding insulin)?  No    Do you take the medication Naltrexone?  No    Do you take blood thinners?  No       Prep   Are you currently on dialysis or do you have chronic kidney disease?  No    Do you have a diagnosis of diabetes?  No    Do you have a diagnosis of cystic fibrosis (CF)?  No    On a regular basis do you go 3 -5 days between bowel movements?  Yes (Extended Prep)    BMI > 40?  No    Preferred Pharmacy:    [facility] #[id number] - [place], [place] - [address] AT [facility] [address]  [address]  [facility] [address]  Phone: [phone] Fax: [phone]            Final Scheduling Details     Procedure scheduled  Colonoscopy / Upper endoscopy (EGD)    Surgeon:  Sheryl     Date of procedure:  3/21     Pre-OP / PAC:   No - Not required for this site.    Location  [facility] - Patient preference.    Sedation   Moderate Sedation - Per order.      Patient Reminders:   You will receive a call from a Nurse to review instructions and health history.  This assessment must be completed prior to your procedure.  Failure to complete the Nurse assessment may result in the procedure being cancelled.      On the day of your procedure, please designate an adult(s) who can drive you home stay with you for the next 24 hours. The medicines used in the exam will make you sleepy. You will not be able to drive.      You cannot take public transportation, ride share services, or non-medical taxi service without a responsible caregiver.  Medical transport services are allowed with the requirement that a responsible caregiver will receive you at your destination.  We require that drivers and caregivers are confirmed prior to your procedure.

## 2024-03-11 NOTE — TELEPHONE ENCOUNTER
Pre visit planning completed.      Procedure details:    Patient scheduled for Colonoscopy/Upper endoscopy (EGD) on 03.21.2024.     Arrival time: 0730. Procedure time 0815    Pre op exam needed? N/A    Facility location: [facility]; [address] [place] [address]    Sedation type: Conscious sedation     Indication for procedure:   Cyclic vomiting syndrome [R11.15]  - Primary      Epigastric pain            Chart review:     Electronic implanted devices? No    Recent diagnosis of diverticulitis within the last 6 weeks? No    Diabetic? No    Diabetic medication HOLDING recommendations: (if applicable)  Oral diabetic medications: N/A  Diabetic injectables: N/A  Insulin: N/A      Medication review:    Anticoagulants? No    NSAIDS? No NSAID medications per patient's medication list.  RN will verify with pre-assessment call.    Other medication HOLDING recommendations:  N/A      Prep for procedure:     Bowel prep recommendation: Extended Golytely. Bowel prep prescription sent to    [facility] #[id number] - [place], [place] - [address] [place] [address] [address]   Due to: constipation noted or reported.     Prep instructions sent via [employer]         Vera Archuleta RN  Endoscopy Procedure Pre Assessment RN  [phone] option 4

## 2024-03-11 NOTE — PATIENT INSTRUCTIONS
"Recommendations from today's MTM visit:                                                      I recommend treatment with bismuth quadruple therapy x 10 days:  Pylera 140-125-125 mg Take 3 capsules by mouth four times daily  Omeprazole 40 mg Take 1 capsule by mouth twice daily 30-60 minutes before meals  Do not take any multivitamins or dairy while on these antibiotics as they can make the tetracycline less effective.   You will be due for stool testing to make sure the H. Pylori is gone no sooner than 4 weeks after completion of your antibiotics. You must be off PPIs such as omeprazole for at least 1-2 weeks prior to eradication testing.     Follow-up: 3/15/2024 at 10:30 AM    It was great speaking with you today.  I value your experience and would be very thankful for your time in providing feedback in our clinic survey. In the next few days, you may receive an email or text message from [email or web address] with a link to a survey related to your  clinical pharmacist.\"     To schedule another MTM appointment, please call the clinic directly or you may call the MTM scheduling line at [phone] or toll-free at [phone].     My Clinical Pharmacist's contact information:                                                      Please feel free to contact me with any questions or concerns you have.      Jazmin SchneiderD, BCPS  MTM Pharmacist   [facility]  Phone: [phone]   "

## 2024-03-12 ENCOUNTER — TELEPHONE (OUTPATIENT)
Dept: GASTROENTEROLOGY | Facility: CLINIC | Age: 44
End: 2024-03-12
Payer: COMMERCIAL

## 2024-03-12 NOTE — TELEPHONE ENCOUNTER
Upon calling patient to complete PA and discussing . Patient's  would have to leave and writer was unclear if they would be able to come back to get patient in a timely manner after procedure. Patient opted to cancel procedure and will reschedule after their sister is back in town. Patient would like to get in in April if possible.     Message sent to scheduling regarding this.    Charley Landrum RN  Endoscopy Procedure Pre Assessment RN  [phone] option 4

## 2024-03-12 NOTE — TELEPHONE ENCOUNTER
Caller: Writer to patient    Reason for Reschedule/Cancellation   (please be detailed, any staff messages or encounters to note?): No ride per staff message from RN.       Prior to reschedule please review:  Ordering Provider: Lucien Sánchez MD   Sedation Determined: Moderate  Does patient have any ASC Exclusions, please identify?: No      Notes on Cancelled Procedure:  Procedure: Upper and Lower Endoscopy [EGD and Colonoscopy]   Date: 3/21/2024  Location: [facility]; [address] [place] [address]  Surgeon: Sheryl      Rescheduled: No, LVM for patient to call back.        Did you cancel or rescheduled an EUS procedure? No.     CASE IN DEPOT

## 2024-03-13 ENCOUNTER — OFFICE VISIT (OUTPATIENT)
Dept: URGENT CARE | Facility: URGENT CARE | Age: 44
End: 2024-03-13
Payer: COMMERCIAL

## 2024-03-13 VITALS
RESPIRATION RATE: 18 BRPM | DIASTOLIC BLOOD PRESSURE: 83 MMHG | TEMPERATURE: 98.4 F | SYSTOLIC BLOOD PRESSURE: 120 MMHG | OXYGEN SATURATION: 97 % | HEART RATE: 60 BPM

## 2024-03-13 DIAGNOSIS — K21.9 GASTROESOPHAGEAL REFLUX DISEASE WITHOUT ESOPHAGITIS: Primary | ICD-10-CM

## 2024-03-13 PROCEDURE — 99214 OFFICE O/P EST MOD 30 MIN: CPT | Performed by: FAMILY MEDICINE

## 2024-03-14 NOTE — PROGRESS NOTES
SUBJECTIVE: Humera Lovell is a 43 year old female presenting with a chief complaint of GERD.  Onset of symptoms was day(s) ago.  Course of illness is waxing and waning.        Past Medical History:   Diagnosis Date    Anxiety     Mild episode of recurrent major depressive disorder (H24)     Other insomnia      No Known Allergies  Social History     Tobacco Use    Smoking status: Never    Smokeless tobacco: Never   Substance Use Topics    Alcohol use: Never       ROS:  SKIN: no rash  GI: no vomiting    OBJECTIVE:  /83   Pulse 60   Temp 98.4  F (36.9  C)   Resp 18   LMP  (LMP Unknown)   SpO2 97% GENERAL APPEARANCE: healthy, alert and no distress  EYES: EOMI,  PERRL, conjunctiva clear  HENT: mouth without ulcers, erythema or lesions  RESP: lungs clear to auscultation - no rales, rhonchi or wheezes  CV: regular rates and rhythm, normal S1 S2, no murmur noted  ABDOMEN:  soft, nontender, no HSM or masses and bowel sounds normal  SKIN: no suspicious lesions or rashes      ICD-10-CM    1. Gastroesophageal reflux disease without esophagitis  K21.9         OTC Prilosc  Fluids/Rest, f/u if worse/not any better

## 2024-03-15 ENCOUNTER — TELEPHONE (OUTPATIENT)
Dept: GASTROENTEROLOGY | Facility: CLINIC | Age: 44
End: 2024-03-15
Payer: COMMERCIAL

## 2024-03-15 NOTE — TELEPHONE ENCOUNTER
Attempted to contact Humera 2x for schedule MTM visit. Unable to reach. Left message with call back number.    Jazmin LeeD, BCPS  MTM Pharmacist   [facility]  Phone: [phone]

## 2024-03-19 ENCOUNTER — TELEPHONE (OUTPATIENT)
Dept: GASTROENTEROLOGY | Facility: CLINIC | Age: 44
End: 2024-03-19
Payer: COMMERCIAL

## 2024-03-19 NOTE — TELEPHONE ENCOUNTER
MTM appointment no showed, we made one more attempt to reschedule.     Routing back to referring provider and MTM pharmacist.       Morgan Hobbs, [facility]

## 2024-03-21 ENCOUNTER — TELEPHONE (OUTPATIENT)
Dept: GASTROENTEROLOGY | Facility: CLINIC | Age: 44
End: 2024-03-21
Payer: COMMERCIAL

## 2024-03-21 NOTE — TELEPHONE ENCOUNTER
"Endoscopy Scheduling Screen    Have you had a positive Covid test in the last 14 days?  No    What is your communication preference for Instructions and/or Bowel Prep?   Mail/USPS    What insurance is in the chart?  Other:  [facility]    Ordering/Referring Provider:     SITA SKY      (If ordering provider performs procedure, schedule with ordering provider unless otherwise instructed. )    BMI: Estimated body mass index is 22.08 kg/m  as calculated from the following:    Height as of 12/29/23: 1.702 m (5' 7\").    Weight as of 12/29/23: 64 kg (141 lb).     Sedation Ordered  moderate sedation.   If patient BMI > 50 do not schedule in [facility].    If patient BMI > 45 do not schedule at [facility].    Are you taking methadone or Suboxone?  No    Have you had difficulties, pain, or discomfort during past endoscopy procedures?  No    Are you taking any prescription medications for pain 3 or more times per week?   NO, No RN review required.    Do you have a history of malignant hyperthermia?  No    (Females) Are you currently pregnant?   No     Have you been diagnosed or told you have pulmonary hypertension?   No    Do you have an LVAD?  No    Have you been told you have moderate to severe sleep apnea?  No    Have you been told you have COPD, asthma, or any other lung disease?  No    Do you have any heart conditions?  No     Have you ever had or are you waiting for an organ transplant?  No. Continue scheduling, no site restrictions.    Have you had a stroke or transient ischemic attack (TIA aka \"mini stroke\" in the last 6 months?   No    Have you been diagnosed with or been told you have cirrhosis of the liver?   No    Are you currently on dialysis?   No    Do you need assistance transferring?   No    BMI: Estimated body mass index is 22.08 kg/m  as calculated from the following:    Height as of 12/29/23: 1.702 m (5' 7\").    Weight as of 12/29/23: 64 kg (141 lb).     Is patients BMI > 40 and scheduling location [facility]?  No    Do " you take an injectable medication for weight loss or diabetes (excluding insulin)?  No    Do you take the medication Naltrexone?  No    Do you take blood thinners?  No       Prep   Are you currently on dialysis or do you have chronic kidney disease?  No    Do you have a diagnosis of diabetes?  No    Do you have a diagnosis of cystic fibrosis (CF)?  No    On a regular basis do you go 3 -5 days between bowel movements?  No    BMI > 40?  No    Preferred Pharmacy:    [facility] #[id number] - [place], [place] - [address] [place] [address]  [address]  [facility] [address]  Phone: [phone] Fax: [phone]    Final Scheduling Details     Procedure scheduled  Colonoscopy / Upper endoscopy (EGD)    Surgeon:  SALO     Date of procedure:  5/21     Pre-OP / PAC:   No - Not required for this site.    Location  [facility] - ASC - Patient preference.    Sedation   Moderate Sedation - Per order.      Patient Reminders:   You will receive a call from a Nurse to review instructions and health history.  This assessment must be completed prior to your procedure.  Failure to complete the Nurse assessment may result in the procedure being cancelled.      On the day of your procedure, please designate an adult(s) who can drive you home stay with you for the next 24 hours. The medicines used in the exam will make you sleepy. You will not be able to drive.      You cannot take public transportation, ride share services, or non-medical taxi service without a responsible caregiver.  Medical transport services are allowed with the requirement that a responsible caregiver will receive you at your destination.  We require that drivers and caregivers are confirmed prior to your procedure.

## 2024-04-07 ENCOUNTER — HEALTH MAINTENANCE LETTER (OUTPATIENT)
Age: 44
End: 2024-04-07
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
## 2024-05-10 NOTE — TELEPHONE ENCOUNTER
Rescheduled colonoscopy/EGD    Pre visit planning completed.      Procedure details:    Patient scheduled for Colonoscopy/Upper endoscopy (EGD) on 5.21.24.     Arrival time: 0945. Procedure time 1030    Facility location: [facility]; [address], [place] [address]. Check in location: 2nd Floor at Surgery desk.    Sedation type: Conscious sedation     Pre op exam needed? N/A    Indication for procedure:   Cyclic vomiting syndrome [R11.15]  - Primary      Epigastric pain             Chart review:     Electronic implanted devices? No    Recent diagnosis of diverticulitis within the last 6 weeks? No    Diabetic? No      Medication review:    Anticoagulants? No    NSAIDS? No NSAID medications per patient's medication list.  RN will verify with pre-assessment call.    Other medication HOLDING recommendations:  N/A      Prep for procedure:     Bowel prep recommendation: Extended Golytely. Bowel prep prescription previously sent to    [facility] #[id number] - [place], [place] - [address] [place] [address] [address] AT [facility] [address] - inquire if patient has this    Due to: constipation noted or reported.     Prep instructions sent via chantal Posey RN  Endoscopy Procedure Pre Assessment RN  [phone] option 4

## 2024-05-10 NOTE — TELEPHONE ENCOUNTER
Attempted to contact patient in order to complete pre assessment questions.     No answer. Left message to return call to [phone] option 4    Callback communication sent via [employer].    Corinne Kliber, RN  Endoscopy Procedure Pre Assessment RN  [phone] option 4

## 2024-05-13 NOTE — TELEPHONE ENCOUNTER
Pre assessment completed for upcoming procedure.   (Please see previous telephone encounter notes for complete details)    Patient  returned call.       Procedure details:    Arrival time and facility location reviewed.    Pre op exam needed? N/A    Designated  policy reviewed. Instructed to have someone stay 6  hours post procedure.       Medication review:    Medications reviewed. Please see supporting documentation below. Holding recommendations discussed (if applicable).       Prep for procedure:     Procedure prep instructions reviewed.        Any additional information needed:  Patient has her prep.       Patient  verbalized understanding and had no questions or concerns at this time.      Vera Nava RN  Endoscopy Procedure Pre Assessment RN  [phone] option 4

## 2024-05-16 RX ORDER — NALOXONE HYDROCHLORIDE 0.4 MG/ML
0.2 INJECTION, SOLUTION INTRAMUSCULAR; INTRAVENOUS; SUBCUTANEOUS
Status: CANCELLED | OUTPATIENT
Start: 2024-05-16

## 2024-05-16 RX ORDER — ONDANSETRON 2 MG/ML
4 INJECTION INTRAMUSCULAR; INTRAVENOUS EVERY 6 HOURS PRN
Status: CANCELLED | OUTPATIENT
Start: 2024-05-16

## 2024-05-16 RX ORDER — NALOXONE HYDROCHLORIDE 0.4 MG/ML
0.4 INJECTION, SOLUTION INTRAMUSCULAR; INTRAVENOUS; SUBCUTANEOUS
Status: CANCELLED | OUTPATIENT
Start: 2024-05-16

## 2024-05-16 RX ORDER — FLUMAZENIL 0.1 MG/ML
0.2 INJECTION, SOLUTION INTRAVENOUS
Status: CANCELLED | OUTPATIENT
Start: 2024-05-16 | End: 2024-05-17

## 2024-05-16 RX ORDER — PROCHLORPERAZINE MALEATE 10 MG
10 TABLET ORAL EVERY 6 HOURS PRN
Status: CANCELLED | OUTPATIENT
Start: 2024-05-16

## 2024-05-16 RX ORDER — ONDANSETRON 4 MG/1
4 TABLET, ORALLY DISINTEGRATING ORAL EVERY 6 HOURS PRN
Status: CANCELLED | OUTPATIENT
Start: 2024-05-16

## 2024-05-21 ENCOUNTER — HOSPITAL ENCOUNTER (OUTPATIENT)
Facility: AMBULATORY SURGERY CENTER | Age: 44
Discharge: HOME | End: 2024-05-21
Attending: INTERNAL MEDICINE | Admitting: INTERNAL MEDICINE
Payer: COMMERCIAL

## 2024-05-21 VITALS
OXYGEN SATURATION: 100 % | TEMPERATURE: 96.8 F | DIASTOLIC BLOOD PRESSURE: 72 MMHG | SYSTOLIC BLOOD PRESSURE: 111 MMHG | RESPIRATION RATE: 18 BRPM | HEART RATE: 56 BPM

## 2024-05-21 LAB
COLONOSCOPY: NORMAL
UPPER GI ENDOSCOPY: NORMAL

## 2024-05-21 PROCEDURE — G8918 PT W/O PREOP ORDER IV AB PRO: HCPCS

## 2024-05-21 PROCEDURE — 88305 TISSUE EXAM BY PATHOLOGIST: CPT | Performed by: PATHOLOGY

## 2024-05-21 PROCEDURE — 45378 DIAGNOSTIC COLONOSCOPY: CPT

## 2024-05-21 PROCEDURE — 43239 EGD BIOPSY SINGLE/MULTIPLE: CPT

## 2024-05-21 PROCEDURE — G8907 PT DOC NO EVENTS ON DISCHARG: HCPCS

## 2024-05-21 RX ORDER — LIDOCAINE 40 MG/G
CREAM TOPICAL
Status: DISCONTINUED | OUTPATIENT
Start: 2024-05-21 | End: 2024-05-22 | Stop reason: HOSPADM

## 2024-05-21 RX ORDER — ONDANSETRON 2 MG/ML
4 INJECTION INTRAMUSCULAR; INTRAVENOUS
Status: DISCONTINUED | OUTPATIENT
Start: 2024-05-21 | End: 2024-05-22 | Stop reason: HOSPADM

## 2024-05-21 RX ORDER — FENTANYL CITRATE 50 UG/ML
INJECTION, SOLUTION INTRAMUSCULAR; INTRAVENOUS PRN
Status: DISCONTINUED | OUTPATIENT
Start: 2024-05-21 | End: 2024-05-21 | Stop reason: HOSPADM

## 2024-05-21 RX ORDER — DIPHENHYDRAMINE HYDROCHLORIDE 50 MG/ML
INJECTION INTRAMUSCULAR; INTRAVENOUS PRN
Status: DISCONTINUED | OUTPATIENT
Start: 2024-05-21 | End: 2024-05-21 | Stop reason: HOSPADM

## 2024-05-21 NOTE — H&P
[facility] Anesthesia Pre-op History and Physical    Humera DALLAS [facility] MRN# [id number]   Age: 43 year old YOB: 1980            Date of Exam 5/21/2024         Primary care provider: Beatriz Cifuentes         Chief Complaint and/or Reason for Procedure:     Epigastric pain, GERD, vomiting, history of H. Pylori - didn't complete treatment for this       Active problem list:     Patient Active Problem List    Diagnosis Date Noted    Mild episode of recurrent major depressive disorder (H24) 04/07/2022     Priority: Medium    Controlled substance agreement terminated 04/05/2022     Priority: Medium     Patient is followed by Data Unavailable for ongoing prescription of benzodiazepines.  All refills should be approved by this provider, or covering partner.    Medication(s): ambien .   Maximum quantity per month: 15  Clinic visit frequency required: Q 6  months     Controlled substance agreement on file: Yes       Date(s): 4/1/2022      Last [facility] website verification:  done on 4/5/2022  [email or web address]    Positive marijuana on urine tox - terminated 4/6/2022        Epigastric pain 03/29/2022     Priority: Medium    NSTEMI (non-ST elevated myocardial infarction) (H) 03/29/2022     Priority: Medium    Chronic gastritis, presence of bleeding unspecified, unspecified gastritis type 03/29/2022     Priority: Medium    Intractable vomiting with nausea, unspecified vomiting type 03/29/2022     Priority: Medium    Cervical high risk HPV (human papillomavirus) test positive 06/03/2018     Priority: Medium     2004 NIL Pap  2008 NIL pap  6/3/18 NIL pap, + HR HPV (not 16 or 18).   12/29/23 NIL Pap, Neg HPV. Plan cotest in 1 year due by 12/29/24              Medications (include herbals and vitamins):   Any Plavix use in the last 7 days? No     Current Outpatient Medications   Medication Sig Dispense Refill    bis subcit-metronidazole-tetracycline (PYLERA) 140-125-125 MG capsule Take 3 capsules  by mouth 4 times daily (with meals and nightly) (Patient not taking: Reported on 3/13/2024) 120 capsule 0    bisacodyl (DULCOLAX) 5 MG EC tablet 2 days prior to procedure, take 2 tablets at 4 pm. 1 day prior to procedure, take 2 tablets at 4 pm. For additional instructions refer to your colonoscopy prep instructions. (Patient not taking: Reported on 3/13/2024) 4 tablet 0    omeprazole (PRILOSEC) 40 MG DR capsule Take 1 capsule (40 mg) by mouth 2 times daily (Patient not taking: Reported on 3/13/2024) 20 capsule 0    omeprazole (PRILOSEC) 40 MG DR capsule Take 40 mg by mouth 2 times daily prn      OVER-THE-COUNTER [employer] brand sleep med      polyethylene glycol (GOLYTELY) 236 g suspension 2 days prior at 5pm, mix and drink half of a jug of Golytely. Drink an 8 oz. glass of Golytely every 15 minutes until half of the jug is gone. Place remainder of Golytely in the refrigerator. 1 day prior at 5 pm, drink the 2nd half of a jug of Golytely bowel prep. 6 hours before your check-in time, drink an 8 oz. glass of Golytely every 15 minutes until half of the 2nd jug of Golytely is gone. Discard remainder of second jug. 8000 mL 0     Current Facility-Administered Medications   Medication Dose Route Frequency Provider Last Rate Last Admin    lidocaine (LMX4) kit   Topical Q1H PRN Catia Guzman, DO        lidocaine 1 % 0.1-1 mL  0.1-1 mL Other Q1H PRN Thomas Catia, DO        ondansetron (ZOFRAN) injection 4 mg  4 mg Intravenous Once PRN McGarrison, Catia, DO        sodium chloride (PF) 0.9% PF flush 3 mL  3 mL Intracatheter Q8H McBeLebron huntistin, DO        sodium chloride (PF) 0.9% PF flush 3 mL  3 mL Intracatheter q1 min prn Lebron Guzmanistin, DO        sodium chloride (PF) 0.9% PF flush 3 mL  3 mL Intravenous q1 min prn Madi Rutherford MD   3 mL at 06/20/23 1109    sodium chloride (PF) 0.9% PF flush 5 mL  5 mL Intravenous q1 min prn Tristen Kurtz MD                 Allergies:    No Known  Allergies  Allergy to Latex? No  Allergy to tape?   No  Intolerances:             Physical Exam:   All vitals have been reviewed  Patient Vitals for the past 8 hrs:   BP Temp Temp src Resp SpO2   05/21/24 1010 124/89 96.8  F (36  C) Temporal 16 100 %     No intake/output data recorded.  Lungs:   No increased work of breathing, good air exchange, clear to auscultation bilaterally, no crackles or wheezing     Cardiovascular:   normal S1 and S2             Lab / Radiology Results:            Anesthetic risk and/or ASA classification:     2  Catia Guzman, DO

## 2024-05-23 DIAGNOSIS — A04.8 H. PYLORI INFECTION: Primary | ICD-10-CM

## 2024-05-23 LAB
PATH REPORT.COMMENTS IMP SPEC: NORMAL
PATH REPORT.COMMENTS IMP SPEC: NORMAL
PATH REPORT.FINAL DX SPEC: NORMAL
PATH REPORT.GROSS SPEC: NORMAL
PATH REPORT.MICROSCOPIC SPEC OTHER STN: NORMAL
PATH REPORT.RELEVANT HX SPEC: NORMAL
PHOTO IMAGE: NORMAL

## 2024-05-28 ENCOUNTER — TELEPHONE (OUTPATIENT)
Dept: GASTROENTEROLOGY | Facility: CLINIC | Age: 44
End: 2024-05-28
Payer: COMMERCIAL

## 2024-05-28 NOTE — TELEPHONE ENCOUNTER
MTM referral from: [facility] visit (referral by provider)    MTM referral outreach attempt #2 on May 28, 2024 at 9:28 AM      Outcome: Patient not reachable after several attempts, routed to Pharmacist Team/Provider as an FYI    Use HBC for the carrier/Plan on the flowsheet      1Lay Message Sent    HALIMA Maharaj

## 2024-06-20 ENCOUNTER — TELEPHONE (OUTPATIENT)
Dept: GASTROENTEROLOGY | Facility: CLINIC | Age: 44
End: 2024-06-20
Payer: COMMERCIAL

## 2024-06-28 ENCOUNTER — VIRTUAL VISIT (OUTPATIENT)
Dept: GASTROENTEROLOGY | Facility: CLINIC | Age: 44
End: 2024-06-28
Attending: INTERNAL MEDICINE
Payer: COMMERCIAL

## 2024-06-28 ENCOUNTER — HOSPITAL ENCOUNTER (EMERGENCY)
Facility: CLINIC | Age: 44
Discharge: HOME | End: 2024-06-28
Attending: EMERGENCY MEDICINE | Admitting: EMERGENCY MEDICINE
Payer: COMMERCIAL

## 2024-06-28 VITALS
DIASTOLIC BLOOD PRESSURE: 80 MMHG | WEIGHT: 147.49 LBS | SYSTOLIC BLOOD PRESSURE: 127 MMHG | TEMPERATURE: 98 F | HEART RATE: 85 BPM | RESPIRATION RATE: 18 BRPM | OXYGEN SATURATION: 99 % | BODY MASS INDEX: 23.15 KG/M2 | HEIGHT: 67 IN

## 2024-06-28 DIAGNOSIS — D53.9 MACROCYTIC ANEMIA: ICD-10-CM

## 2024-06-28 DIAGNOSIS — A04.8 BACTERIAL INFECTION DUE TO H. PYLORI: ICD-10-CM

## 2024-06-28 DIAGNOSIS — M54.50 ACUTE BILATERAL LOW BACK PAIN WITHOUT SCIATICA: ICD-10-CM

## 2024-06-28 DIAGNOSIS — I21.4 NSTEMI (NON-ST ELEVATED MYOCARDIAL INFARCTION) (H): Primary | ICD-10-CM

## 2024-06-28 LAB
ALBUMIN SERPL BCG-MCNC: 3.8 G/DL (ref 3.5–5.2)
ALBUMIN UR-MCNC: NEGATIVE MG/DL
ALP SERPL-CCNC: 57 U/L (ref 40–150)
ALT SERPL W P-5'-P-CCNC: 14 U/L (ref 0–50)
ANION GAP SERPL CALCULATED.3IONS-SCNC: 11 MMOL/L (ref 7–15)
APPEARANCE UR: CLEAR
AST SERPL W P-5'-P-CCNC: 23 U/L (ref 0–45)
BASOPHILS # BLD AUTO: 0 10E3/UL (ref 0–0.2)
BASOPHILS NFR BLD AUTO: 1 %
BILIRUB SERPL-MCNC: <0.2 MG/DL
BILIRUB UR QL STRIP: NEGATIVE
BUN SERPL-MCNC: 14.7 MG/DL (ref 6–20)
CALCIUM SERPL-MCNC: 8.7 MG/DL (ref 8.6–10)
CHLORIDE SERPL-SCNC: 104 MMOL/L (ref 98–107)
COLOR UR AUTO: ABNORMAL
CREAT SERPL-MCNC: 0.71 MG/DL (ref 0.51–0.95)
DEPRECATED HCO3 PLAS-SCNC: 22 MMOL/L (ref 22–29)
EGFRCR SERPLBLD CKD-EPI 2021: >90 ML/MIN/1.73M2
EOSINOPHIL # BLD AUTO: 0.2 10E3/UL (ref 0–0.7)
EOSINOPHIL NFR BLD AUTO: 5 %
ERYTHROCYTE [DISTWIDTH] IN BLOOD BY AUTOMATED COUNT: 11.3 % (ref 10–15)
GLUCOSE SERPL-MCNC: 90 MG/DL (ref 70–99)
GLUCOSE UR STRIP-MCNC: NEGATIVE MG/DL
HCG UR QL: NEGATIVE
HCT VFR BLD AUTO: 33.3 % (ref 35–47)
HGB BLD-MCNC: 11 G/DL (ref 11.7–15.7)
HGB UR QL STRIP: NEGATIVE
IMM GRANULOCYTES # BLD: 0 10E3/UL
IMM GRANULOCYTES NFR BLD: 0 %
KETONES UR STRIP-MCNC: NEGATIVE MG/DL
LEUKOCYTE ESTERASE UR QL STRIP: ABNORMAL
LIPASE SERPL-CCNC: 19 U/L (ref 13–60)
LYMPHOCYTES # BLD AUTO: 1.5 10E3/UL (ref 0.8–5.3)
LYMPHOCYTES NFR BLD AUTO: 39 %
MCH RBC QN AUTO: 34.2 PG (ref 26.5–33)
MCHC RBC AUTO-ENTMCNC: 33 G/DL (ref 31.5–36.5)
MCV RBC AUTO: 103 FL (ref 78–100)
MONOCYTES # BLD AUTO: 0.4 10E3/UL (ref 0–1.3)
MONOCYTES NFR BLD AUTO: 11 %
MUCOUS THREADS #/AREA URNS LPF: PRESENT /LPF
NEUTROPHILS # BLD AUTO: 1.7 10E3/UL (ref 1.6–8.3)
NEUTROPHILS NFR BLD AUTO: 45 %
NITRATE UR QL: NEGATIVE
NRBC # BLD AUTO: 0 10E3/UL
NRBC BLD AUTO-RTO: 0 /100
PH UR STRIP: 5.5 [PH] (ref 5–7)
PLATELET # BLD AUTO: 271 10E3/UL (ref 150–450)
POTASSIUM SERPL-SCNC: 4 MMOL/L (ref 3.4–5.3)
PROT SERPL-MCNC: 6.7 G/DL (ref 6.4–8.3)
RBC # BLD AUTO: 3.22 10E6/UL (ref 3.8–5.2)
RBC URINE: <1 /HPF
SODIUM SERPL-SCNC: 137 MMOL/L (ref 135–145)
SP GR UR STRIP: 1.02 (ref 1–1.03)
SQUAMOUS EPITHELIAL: 2 /HPF
UROBILINOGEN UR STRIP-MCNC: NORMAL MG/DL
WBC # BLD AUTO: 3.9 10E3/UL (ref 4–11)
WBC URINE: 2 /HPF

## 2024-06-28 PROCEDURE — 87086 URINE CULTURE/COLONY COUNT: CPT | Performed by: EMERGENCY MEDICINE

## 2024-06-28 PROCEDURE — 36415 COLL VENOUS BLD VENIPUNCTURE: CPT | Performed by: EMERGENCY MEDICINE

## 2024-06-28 PROCEDURE — 85041 AUTOMATED RBC COUNT: CPT | Performed by: EMERGENCY MEDICINE

## 2024-06-28 PROCEDURE — 99283 EMERGENCY DEPT VISIT LOW MDM: CPT

## 2024-06-28 PROCEDURE — 81025 URINE PREGNANCY TEST: CPT | Performed by: EMERGENCY MEDICINE

## 2024-06-28 PROCEDURE — 83690 ASSAY OF LIPASE: CPT | Performed by: EMERGENCY MEDICINE

## 2024-06-28 PROCEDURE — 81001 URINALYSIS AUTO W/SCOPE: CPT | Performed by: EMERGENCY MEDICINE

## 2024-06-28 PROCEDURE — 80053 COMPREHEN METABOLIC PANEL: CPT | Performed by: EMERGENCY MEDICINE

## 2024-06-28 RX ORDER — METHOCARBAMOL 500 MG/1
500 TABLET, FILM COATED ORAL DAILY PRN
Qty: 5 TABLET | Refills: 0 | Status: SHIPPED | OUTPATIENT
Start: 2024-06-28 | End: 2024-07-11

## 2024-06-28 RX ORDER — OMEPRAZOLE 40 MG/1
40 CAPSULE, DELAYED RELEASE ORAL 2 TIMES DAILY
Qty: 20 CAPSULE | Refills: 0 | Status: SHIPPED | OUTPATIENT
Start: 2024-06-28 | End: 2024-07-08

## 2024-06-28 RX ORDER — ACETAMINOPHEN 500 MG
1000 TABLET ORAL ONCE
Status: DISCONTINUED | OUTPATIENT
Start: 2024-06-28 | End: 2024-06-28 | Stop reason: HOSPADM

## 2024-06-28 RX ORDER — BISMUTH SUBCITRATE POTASSIUM, METRONIDAZOLE, TETRACYCLINE HYDROCHLORIDE 140; 125; 125 MG/1; MG/1; MG/1
3 CAPSULE ORAL
Qty: 120 CAPSULE | Refills: 0 | Status: SHIPPED | OUTPATIENT
Start: 2024-06-28 | End: 2024-07-08

## 2024-06-28 ASSESSMENT — COLUMBIA-SUICIDE SEVERITY RATING SCALE - C-SSRS
1. IN THE PAST MONTH, HAVE YOU WISHED YOU WERE DEAD OR WISHED YOU COULD GO TO SLEEP AND NOT WAKE UP?: NO
2. HAVE YOU ACTUALLY HAD ANY THOUGHTS OF KILLING YOURSELF IN THE PAST MONTH?: NO
6. HAVE YOU EVER DONE ANYTHING, STARTED TO DO ANYTHING, OR PREPARED TO DO ANYTHING TO END YOUR LIFE?: NO

## 2024-06-28 ASSESSMENT — ACTIVITIES OF DAILY LIVING (ADL)
ADLS_ACUITY_SCORE: 33
ADLS_ACUITY_SCORE: 33

## 2024-06-28 NOTE — PROGRESS NOTES
Medication Therapy Management (MTM) Encounter    ASSESSMENT:                            Medication Adherence/Access: No issues identified    H. Pylori:  Humera would benefit from treatment of H. Pylori. In total since working with her since October 2023 she has only taken a total of two days of bismuth quadruple therapy, as a result I would still classify this as initial treatment. Given local clarithromycin resistance rates, I recommend treatment with bismuth quadruple therapy x 10 days. No adjustments for renal or hepatic function required. No clinically significant drug-drug interactions present. They will be due for eradication testing no sooner than 4 weeks after completion of their antibiotics. They should not take PPIs for at least 1-2 weeks prior to eradication testing.     History of NSTEMI: May need additional therapy. Recommend discussing this with her primary care provider.    PLAN:                            Discuss whether you should be on any additional medications, such as statins, given NSTEMI/Myocarditis in 2022.  I recommend treatment with bismuth quadruple therapy x 10 days:  Pylera 140-125-125 mg Take 3 capsules by mouth four times daily  Omeprazole 40 mg Take 1 capsule by mouth twice daily 30-60 minutes before meals  Do not take any multivitamins or dairy while on these antibiotics as they can make the tetracycline less effective.   You will be due for stool testing to make sure the H. Pylori is gone no sooner than 4 weeks after completion of your antibiotics. You must be off PPIs such as omeprazole for at least 1-2 weeks prior to eradication testing.     Follow-up: 7/11/2024 at 11:30 AM (telephone)    SUBJECTIVE/OBJECTIVE:                          Humera Lovell is a 43 year old female seen for a follow-up visit.       Reason for visit: H. Pylori follow-up visit.    Allergies/ADRs: Reviewed in chart  Past Medical History: Reviewed in chart  Tobacco: She reports that she has never smoked. She has  never used smokeless tobacco.  Alcohol: not currently using      Medication Adherence/Access: no issues reported    H. Pylori:   No current medications    Met with Humera for an H. Pylori follow-up visit. I had originally met with Humera in October 2023 to discuss treatment of H. Pylori. I recommended bismuth quadruple therapy however this was never started. Follow-up visit 2/16/24 and 3/1/24 she had also not started bismuth quadruple therapy. She no showed our subsequent appointment but she reports she tried taking the bismuth quad for 2 days but self-discontinued due to side effects (headache, fatigue). Endoscopy 5/21 showed she still had H. Pylori infection. We discussed nature of these side effects and pros and cons of repeating treatment with bismuth quadruple therapy, overall she feels she would like to give this regimen another try.    She reports she was taking Pylera in the morning, 11:30 AM, 4 PM and before bed, and omeprazole 30-60 minutes before morning and evening meal.     Diagnosis by stool antigen test on 10/2/2023; positive stomach biopsy 5/21/2024  Previous treatment regimens: incomplete trial of Pylera  Previous Macrolide exposure: not on file  Penicillin Allergy: No  Pregnant/breastfeeding: pregnancy test negative 6/28/24  KIDNEY: No known chronic kidney disease  LIVER: No known chronic liver disease  Cardiac: history of NSTEMI  Drug-drug interactions: No dairy, multivitamins or antacids     History of NSTEMI:  No current medications    According to chart review it appears she had an NSTEMI 3/2022. She is not currently taking statin, beta blockers, aspirin, antiplatelet therapy. Recommend discussing this with her primary care provider.  ----------------      I spent 20 minutes with this patient today. All changes were made via collaborative practice agreement with Catia Guzman DO. A copy of the visit note was provided to the patient's provider(s).    A summary of these recommendations was sent  via [employer].    José Miguel Carcamo, PharmD, BCPS  MTM Pharmacist   [facility]  Phone: [phone]    Telemedicine Visit Details  Type of service:  Telephone visit  Start Time:  11:00 AM  End Time:  11:20 AM     Medication Therapy Recommendations  No medication therapy recommendations to display

## 2024-06-28 NOTE — PATIENT INSTRUCTIONS
"Recommendations from today's MTM visit:                                                      Discuss whether you should be on any additional medications, such as statins, given NSTEMI/Myocarditis in 2022.  I recommend treatment with bismuth quadruple therapy x 10 days:  Pylera 140-125-125 mg Take 3 capsules by mouth four times daily  Omeprazole 40 mg Take 1 capsule by mouth twice daily 30-60 minutes before meals  Do not take any multivitamins or dairy while on these antibiotics as they can make the tetracycline less effective.   You will be due for stool testing to make sure the H. Pylori is gone no sooner than 4 weeks after completion of your antibiotics. You must be off PPIs such as omeprazole for at least 1-2 weeks prior to eradication testing.     Follow-up: 7/11/2024 at 11:30 AM (telephone)    It was great speaking with you today.  I value your experience and would be very thankful for your time in providing feedback in our clinic survey. In the next few days, you may receive an email or text message from [employer] with a link to a survey related to your  clinical pharmacist.\"     To schedule another MTM appointment, please call the clinic directly or you may call the MTM scheduling line at [phone] or toll-free at [phone].     My Clinical Pharmacist's contact information:                                                      Please feel free to contact me with any questions or concerns you have.      José Miguel Carcamo, PharmD, BCPS  MTM Pharmacist   [facility]  Phone: [phone]   "
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
## 2024-06-28 NOTE — DISCHARGE INSTRUCTIONS
Follow-up:  Please follow-up with PCP in 2-3 days for re-evaluation and discussion of your visit to the emergency department today.  You should have your hemoglobin rechecked in the next few days.      Home treatments:  Recommended home therapies include rest, fluids, anti-inflammatories (though would avoid NSAIDs) as needed, topical lidocaine patch, Robaxin for muscle spasm as needed.    New prescriptions:  Robaxin (no driving while taking this)    Return precautions:  Warning signs which should prompt you to return to the ER include worsening pain, fevers, chills, or any other new or troubling symptoms.  We are always happy to see you again.    Discharge Instructions  Back Pain  You were seen today for back pain. Back pain can have many causes, but most will get better without surgery or other specific treatment. Sometimes there is a herniated ( slipped ) disc. We do not usually do MRI scans to look for these right away, since most herniated discs will get better on their own with time.  Today, we did not find any evidence that your back pain was caused by a serious condition. However, sometimes symptoms develop over time and cannot be found during an emergency visit, so it is very important that you follow up with your primary provider.  Generally, every Emergency Department visit should have a follow-up clinic visit with either a primary or a specialty clinic/provider. Please follow-up as instructed by your emergency provider today.    Return to the Emergency Department if:  You develop a fever with your back pain.   You have weakness or change in sensation in one or both legs.  You lose control of your bowels or bladder, or cannot empty your bladder (cannot pee).  Your pain gets much worse.     Follow-up with your provider:  Unless your pain has completely gone away, please make an appointment with your provider within one week. Most of the routine care for back pain is available in a clinic and not the  Emergency Department. You may need further management of your back pain, such as more pain medication, imaging such as an X-ray or MRI, or physical therapy.    What can I do to help myself?  Remain Active -- People are often afraid that they will hurt their back further or delay recovery by remaining active, but this is one of the best things you can do for your back. In fact, staying in bed for a long time to rest is not recommended. Studies have shown that people with low back pain recover faster when they remain active. Movement helps to bring blood flow to the muscles and relieve muscle spasms as well as preventing loss of muscle strength.  Heat -- Using a heating pad can help with low back pain during the first few weeks. Do not sleep with a heating pad, as you can be burned.   Pain medications - You may take a pain medication such as Tylenol  (acetaminophen), Advil , Motrin  (ibuprofen) or Aleve  (naproxen).  If you were given a prescription for medicine here today, be sure to read all of the information (including the package insert) that comes with your prescription.  This will include important information about the medicine, its side effects, and any warnings that you need to know about.  The pharmacist who fills the prescription can provide more information and answer questions you may have about the medicine.  If you have questions or concerns that the pharmacist cannot address, please call or return to the Emergency Department.   Remember that you can always come back to the Emergency Department if you are not able to see your regular provider in the amount of time listed above, if you get any new symptoms, or if there is anything that worries you.

## 2024-06-29 LAB — BACTERIA UR CULT: NORMAL

## 2024-07-02 ENCOUNTER — VIRTUAL VISIT (OUTPATIENT)
Dept: INTERNAL MEDICINE | Facility: CLINIC | Age: 44
End: 2024-07-02
Payer: COMMERCIAL

## 2024-07-02 DIAGNOSIS — D64.9 ANEMIA, UNSPECIFIED TYPE: Primary | ICD-10-CM

## 2024-07-02 DIAGNOSIS — A04.8 HELICOBACTER PYLORI INFECTION: ICD-10-CM

## 2024-07-02 DIAGNOSIS — M62.830 BACK MUSCLE SPASM: ICD-10-CM

## 2024-07-02 DIAGNOSIS — E55.9 VITAMIN D DEFICIENCY: ICD-10-CM

## 2024-07-02 DIAGNOSIS — Z12.31 VISIT FOR SCREENING MAMMOGRAM: ICD-10-CM

## 2024-07-02 PROBLEM — F33.0 MILD EPISODE OF RECURRENT MAJOR DEPRESSIVE DISORDER (H): Status: RESOLVED | Noted: 2022-04-07 | Resolved: 2024-07-02

## 2024-07-02 PROCEDURE — G2211 COMPLEX E/M VISIT ADD ON: HCPCS | Mod: 95 | Performed by: NURSE PRACTITIONER

## 2024-07-02 PROCEDURE — 99213 OFFICE O/P EST LOW 20 MIN: CPT | Mod: 95 | Performed by: NURSE PRACTITIONER

## 2024-07-02 ASSESSMENT — PATIENT HEALTH QUESTIONNAIRE - PHQ9
SUM OF ALL RESPONSES TO PHQ QUESTIONS 1-9: 8
5. POOR APPETITE OR OVEREATING: SEVERAL DAYS

## 2024-07-02 ASSESSMENT — ANXIETY QUESTIONNAIRES
1. FEELING NERVOUS, ANXIOUS, OR ON EDGE: SEVERAL DAYS
2. NOT BEING ABLE TO STOP OR CONTROL WORRYING: SEVERAL DAYS
3. WORRYING TOO MUCH ABOUT DIFFERENT THINGS: NOT AT ALL
GAD7 TOTAL SCORE: 5
7. FEELING AFRAID AS IF SOMETHING AWFUL MIGHT HAPPEN: SEVERAL DAYS
5. BEING SO RESTLESS THAT IT IS HARD TO SIT STILL: NOT AT ALL
6. BECOMING EASILY ANNOYED OR IRRITABLE: SEVERAL DAYS
IF YOU CHECKED OFF ANY PROBLEMS ON THIS QUESTIONNAIRE, HOW DIFFICULT HAVE THESE PROBLEMS MADE IT FOR YOU TO DO YOUR WORK, TAKE CARE OF THINGS AT HOME, OR GET ALONG WITH OTHER PEOPLE: SOMEWHAT DIFFICULT
GAD7 TOTAL SCORE: 5

## 2024-07-02 NOTE — PROGRESS NOTES
Humera is a 43 year old who is being evaluated via a billable video visit.    How would you like to obtain your AVS? MyChart  If the video visit is dropped, the invitation should be resent by: Text to cell phone: [phone]  Will anyone else be joining your video visit? No      Assessment & Plan     Anemia, unspecified type  She was taking a multivitamin and B12 but quit 3 months ago.  Her hemoglobin was 11.  Her MCV was elevated which looks more like a B12 deficiency.  She needs to take treatment for H. pylori so we discussed her doing the treatment for H. pylori and then after she is done with that to recheck her labs including iron and B12 and then we will see what she needs as far as supplements  - Iron and iron binding capacity; Future  - Ferritin; Future  - Vitamin B12; Future  - Folate; Future  - CBC with platelets and differential; Future    Vitamin D deficiency  She stopped this supplement 3 months ago and would like to check it when we do her labs  - Vitamin D Deficiency; Future    Visit for screening mammogram    - MA Screening Bilateral w/ Murtaza; Future    Helicobacter pylori infection  She has an order for treatment but the pharmacy did not have it in stock so she is waiting until they have it in stock for her to start    Back muscle spasm  The muscle relaxer helped her and a massage and she feels well today- no issues with the muscle spasms        MED REC REQUIRED  Post Medication Reconciliation Status:  Discharge medications reconciled, continue medications without change    Patient Instructions   Lab after treatment for H pylori       Subjective   Humera is a 43 year old, presenting for the following health issues:  ER F/U        [id number]     12:56 pm   Additional Questions   Roomed by Melissa STUART       Video Start Time: 2:27 PM- called and left message   254 start     HPI     ED/UC Followup:    Facility:  [facility]  Date of visit: 6-28-24  Reason for visit: back pain  Current Status: pt states she has  much relief, meds helped and did some stretching exercises and a massage.  Back spasm   Thought it could be infection - negative urine     H pylori - treatment not in at pharmacy      Hgb low - check lab   Stopped taking supplement before treating H pylori 3 month ago   MVI, B12-vit d stopped       Review of Systems  Constitutional, neuro, ENT, endocrine, pulmonary, cardiac, gastrointestinal, genitourinary, musculoskeletal, integument and psychiatric systems are negative, except as otherwise noted.      Objective    Vitals - Patient Reported  Weight (Patient Reported): 64 kg (141 lb)        Physical Exam   GENERAL: alert and no distress  EYES: Eyes grossly normal to inspection.  No discharge or erythema, or obvious scleral/conjunctival abnormalities.  RESP: No audible wheeze, cough, or visible cyanosis.    SKIN: Visible skin clear. No significant rash, abnormal pigmentation or lesions.  NEURO: Cranial nerves grossly intact.  Mentation and speech appropriate for age.  PSYCH: Appropriate affect, tone, and pace of words          Video-Visit Details    Type of service:  Video Visit   Video End Time:3:02 PM  Originating Location (pt. Location): Home    Distant Location (provider location):  On-site  Platform used for Video Visit: Brien  Signed Electronically by: CHONG Abdalla CNP

## 2024-07-02 NOTE — NURSING NOTE
"Chief Complaint   Patient presents with    ER F/U     initial There were no vitals taken for this visit. Estimated body mass index is 23.1 kg/m  as calculated from the following:    Height as of 6/28/24: 1.702 m (5' 7\").    Weight as of 6/28/24: 66.9 kg (147 lb 7.8 oz)..  bp completed using cuff size NA (Not Taken)  ROE TUCKER LPN  "

## 2024-07-11 ENCOUNTER — VIRTUAL VISIT (OUTPATIENT)
Dept: GASTROENTEROLOGY | Facility: CLINIC | Age: 44
End: 2024-07-11
Attending: INTERNAL MEDICINE
Payer: COMMERCIAL

## 2024-07-11 ENCOUNTER — TELEPHONE (OUTPATIENT)
Dept: INTERNAL MEDICINE | Facility: CLINIC | Age: 44
End: 2024-07-11
Payer: COMMERCIAL

## 2024-07-11 DIAGNOSIS — A04.8 H. PYLORI INFECTION: Primary | ICD-10-CM

## 2024-07-11 RX ORDER — ACETAMINOPHEN 325 MG/1
1000 TABLET ORAL EVERY 6 HOURS PRN
COMMUNITY

## 2024-07-11 NOTE — PROGRESS NOTES
Medication Therapy Management (MTM) Encounter    ASSESSMENT:                            Medication Adherence/Access: No issues identified    H. Pylori:  Humera would benefit from continued treatment of H. Pylori with Pylera and omeprazole. She is taking medications appropriately. She is having mild side effects but will continue taking medications as directed. Her anticipated completion date is 7/16.     PLAN:                            Continue taking your Pylera and omeprazole as directed.    Follow-up: 7/17/24 at 1:30 PM (telephone)    SUBJECTIVE/OBJECTIVE:                          Humera Lovell is a 43 year old female seen for a follow-up visit.       Reason for visit: H. Pylori follow-up visit.    Allergies/ADRs: Reviewed in chart  Past Medical History: Reviewed in chart  Tobacco: She reports that she has never smoked. She has never used smokeless tobacco.  Alcohol: not currently using      Medication Adherence/Access: no issues reported    H. Pylori:   Pylera 140-125-125 mg Take 3 capsules by mouth four times daily  Omeprazole 40 mg twice daily     Met with Humera to follow along with her H. Pylori treatment to support adherence. See MTM visit note 6/28/24 for more detail on selection of treatment.     Started regimen on: 7/7/2024  Taking medications as directed: 8:30, 1:30, 6:30 and 10:30 (omeprazole morning and night).   Adverse effects: dizziness, headache, poor appetite   Anticipated completion date: 7/16  ----------------      I spent 27 minutes with this patient today. All changes were made via collaborative practice agreement with Catia Guzman DO. A copy of the visit note was provided to the patient's provider(s).    A summary of these recommendations was given to the patient.    José Miguel Carcamo, PharmD, BCPS  MTM Pharmacist   [facility]  Phone: [phone]    Telemedicine Visit Details  Type of service:  Telephone visit  Start Time:  11:30 AM  End Time:  11:57 AM     Medication  Therapy Recommendations  No medication therapy recommendations to display

## 2024-07-11 NOTE — Clinical Note
"7/11/2024      Humera Lovell  [address]  [address]  [place] [address]      Dear Colleague,    Thank you for referring your patient, Humera Lovell, to the [facility]. Please see a copy of my visit note below.    Medication Therapy Management (MTM) Encounter    ASSESSMENT:                            Medication Adherence/Access: {adherencechoices:890518}    ***:  ***      PLAN:                            ***    Follow-up: {followuptest2:786653}    SUBJECTIVE/OBJECTIVE:                          Humera Lovell is a 43 year old female seen for {mtmvisit:771463}     Reason for visit: ***.    Allergies/ADRs: {1/2/3/4/5:820924}  Past Medical History: {1/2/3/4/5:800172}  Tobacco: She reports that she has never smoked. She has never used smokeless tobacco.  Alcohol: {ALCOHOL CONSUMPTION HX:877385}  {Social and Goals:987582}    Medication Adherence/Access: {fumedaerence:948940}    H. Pylori:   Pylera 140-125-125 mg Take 3 capsules by mouth four times daily  Omeprazole 40 mg twice daily     Threw up an hour after     Started regimen on: 7/7/2024  Taking medications as directed: 8:30, 1:30, 6:30 and 10:30 (omeprazole morning and night).   Adverse effects: dizziness, headache, poor appetite   Anticipated completion date: 7/16  Eradication testing no sooner than:  Today's Vitals: There were no vitals taken for this visit.  ----------------  {ALINE?:342236}    I spent {mtm total time 3:833568} with this patient today. { :688841}. A copy of the visit note was provided to the patient's provider(s).    A summary of these recommendations {GIVEN/NOT GIVEN:642686}.    ***    Telemedicine Visit Details  Type of service:  {telemedvisitmtm:132928::\"Telephone visit\"}  Start Time: {video/phone visit start time:152948}  End Time: {video/phone visit end time:152948}     Medication Therapy Recommendations  No medication therapy recommendations to display       Medication Therapy Management (MTM) " Encounter    ASSESSMENT:                            Medication Adherence/Access: No issues identified    H. Pylori:  Humera would benefit from continued treatment of H. Pylori with Pylera and omeprazole. She is taking medications appropriately. She is having mild side effects but will continue taking medications as directed. Her anticipated completion date is 7/16.     PLAN:                            Continue taking your Pylera and omeprazole as directed.    Follow-up: 7/17/24 at 1:30 PM (telephone)    SUBJECTIVE/OBJECTIVE:                          Humera Lovell is a 43 year old female seen for {mtmvisit:733613}     Reason for visit: H. Pylori follow-up visit.    Allergies/ADRs: Reviewed in chart  Past Medical History: Reviewed in chart  Tobacco: She reports that she has never smoked. She has never used smokeless tobacco.  Alcohol: not currently using      Medication Adherence/Access: no issues reported    H. Pylori:   Pylera 140-125-125 mg Take 3 capsules by mouth four times daily  Omeprazole 40 mg twice daily     Met with Humera to follow along with her H. Pylori treatment to support adherence. See MT visit note 6/28/24 for more detail on selection of treatment.     Started regimen on: 7/7/2024  Taking medications as directed: 8:30, 1:30, 6:30 and 10:30 (omeprazole morning and night).   Adverse effects: dizziness, headache, poor appetite   Anticipated completion date: 7/16  ----------------      I spent 27 minutes with this patient today. All changes were made via collaborative practice agreement with Catia Guzman DO. A copy of the visit note was provided to the patient's provider(s).    A summary of these recommendations was given to the patient.    José Miguel Carcamo, PharmD, BCPS  [facility] Pharmacist   [facility]  Phone: [phone]    Telemedicine Visit Details  Type of service:  Telephone visit  Start Time:  11:30 AM  End Time:  11:57 AM     Medication Therapy Recommendations  No medication  therapy recommendations to display         Again, thank you for allowing me to participate in the care of your patient.        Sincerely,        José Miguel Carcamo RPH

## 2024-07-11 NOTE — TELEPHONE ENCOUNTER
This should be virtual visit if we are discussing medication and prescribing a new medication - telephone or video     I do not see any mention of insomnia in either recent visit with me 12/2023 and 7/2024  She was last prescribe ambien 10/2023

## 2024-07-11 NOTE — TELEPHONE ENCOUNTER
S-(situation): Patient is calling to request medication for insomnia other than Ambien.     B-(background): Patient used to take Ambien (discontinued on 12/29/23)    A-(assessment): Patient states that her insomnia is worsening. She did not got to work today because she couldn't function. Although last night's sleep was better than previous nights, her insomnia is severe at times, and she needs medication (other than Ambien). Patient prefers not to use OTC meds. Patient is requesting for Rx ASAP.     R-(recommendations): Pharmacy pended if appropriate.

## 2024-07-11 NOTE — TELEPHONE ENCOUNTER
"Patient informed of provider's message below.  Offered virtual visit tomorrow with primary care provider.    Patient states she needs something today.  She is having side effects from her h pylori medications (Omeprazole and Pylera) - HA, dizziness, and insomnia.  Reports she cannot take her otc sleep medication due to interactions with h pylori medications (patient did not state which otc medication she takes).    She was very insistent on the phone for either an appointment today or different medication and \"cannot go another night without sleeping\".    Please advise, thanks.  "

## 2024-07-11 NOTE — TELEPHONE ENCOUNTER
Left message for patient to call back.  Please advise of Beatriz's message below, and message below in this note.    (Also, this RN huddled with primary care provider regarding patient's statement that she cannot take otc sleep aid when taking her H pylori medications due to potential adverse interactions.  Beatriz looked up to see if there are any contraindications with otc sleep aid and Pylera.  Beatriz states patient can take otc Benadryl or Unisom to help her sleep without any adverse reaction with Pylera.)

## 2024-07-12 ENCOUNTER — VIRTUAL VISIT (OUTPATIENT)
Dept: INTERNAL MEDICINE | Facility: CLINIC | Age: 44
End: 2024-07-12
Payer: COMMERCIAL

## 2024-07-12 DIAGNOSIS — G47.00 INSOMNIA, UNSPECIFIED TYPE: Primary | ICD-10-CM

## 2024-07-12 PROCEDURE — 99214 OFFICE O/P EST MOD 30 MIN: CPT | Mod: 95 | Performed by: NURSE PRACTITIONER

## 2024-07-12 RX ORDER — TRAZODONE HYDROCHLORIDE 50 MG/1
50-100 TABLET, FILM COATED ORAL AT BEDTIME
Qty: 60 TABLET | Refills: 1 | Status: SHIPPED | OUTPATIENT
Start: 2024-07-12

## 2024-07-12 RX ORDER — BISMUTH SUBCITRATE POTASSIUM, METRONIDAZOLE, TETRACYCLINE HYDROCHLORIDE 140; 125; 125 MG/1; MG/1; MG/1
3 CAPSULE ORAL
COMMUNITY
Start: 2024-07-12 | End: 2024-07-17

## 2024-07-12 NOTE — NURSING NOTE
"Chief Complaint   Patient presents with    Sleep Problem     Pt states she is on medication for H pylori but there is no documentation in her chart.     initial There were no vitals taken for this visit. Estimated body mass index is 23.1 kg/m  as calculated from the following:    Height as of 6/28/24: 1.702 m (5' 7\").    Weight as of 6/28/24: 66.9 kg (147 lb 7.8 oz)..  bp completed using cuff size NA (Not Taken)  ROE TUCKER LPN  "

## 2024-07-12 NOTE — PROGRESS NOTES
Humera is a 43 year old who is being evaluated via a billable video visit.    How would you like to obtain your AVS? Zeeshanhart  If the video visit is dropped, the invitation should be resent by: Text to cell phone: [phone]  Will anyone else be joining your video visit? No      Assessment & Plan     Insomnia, unspecified type  She is on Pylera for H. pylori in her sleep has been interrupted in the last 3 nights.  She did try Unisom and Benadryl and they were not effective for sleep.  In the past she has used Ambien and Lunesta but does not want to do either of those medications.  She wants to try trazodone and see if that works for her            Patient Instructions   Trazodone 50 to 100 mg at bedtime if needed for sleep    Subjective   Humera is a 43 year old, presenting for the following health issues:  Chief Complaint   Patient presents with    Sleep Problem     Pt states she is on medication for H pylori but there is no documentation in her chart.           7/12/2024    11:23 AM   Additional Questions   Roomed by Melissa STUART       Video Start Time: 11:37 AM    HPI   She is on PYLERA for H pylori     She has had sleeping issues   3 days of sleeping issues -she did not go to work for 3 days because of her sleep    OTC- unisom benadryl not working     She used Ambien in the past but does not want to redo that.  She also at some point did Lunesta but said that that made her feel bad or made a taste in her mouth    She was given trazodone back in the day but she never tried it.  She would like to try that medicine now    Feels dizzy and headaches -this is probably related to the Pylera for the H. pylori as it has Flagyl and it  She is committed to finish her H. pylori treatment but she is aware that the medicines may cause some symptoms    Monthly has insomnia some times -she feels sad and her mood is very off  We did discuss possibility of doing a daily medicine for depression and anxiety, but she decided that she  did not want to do that.  In the past Zoloft was sent but she never used it  She wants to see what she can do on her own without medication      Review of Systems  Constitutional, neuro, ENT, endocrine, pulmonary, cardiac, gastrointestinal, genitourinary, musculoskeletal, integument and psychiatric systems are negative, except as otherwise noted.      Objective           Vitals:  No vitals were obtained today due to virtual visit.    Physical Exam   GENERAL: alert and no distress  EYES: Eyes grossly normal to inspection.  No discharge or erythema, or obvious scleral/conjunctival abnormalities.  RESP: No audible wheeze, cough, or visible cyanosis.    SKIN: Visible skin clear. No significant rash, abnormal pigmentation or lesions.  NEURO: Cranial nerves grossly intact.  Mentation and speech appropriate for age.  PSYCH: Appropriate affect, tone, and pace of words          Video-Visit Details    Type of service:  Video Visit   Video End Time:11:56 AM  Originating Location (pt. Location): Home    Distant Location (provider location):  On-site  Platform used for Video Visit: Joey  Signed Electronically by: CHONG Abdalla CNP

## 2024-07-17 ENCOUNTER — VIRTUAL VISIT (OUTPATIENT)
Dept: GASTROENTEROLOGY | Facility: CLINIC | Age: 44
End: 2024-07-17
Attending: INTERNAL MEDICINE
Payer: COMMERCIAL

## 2024-07-17 DIAGNOSIS — A04.8 BACTERIAL INFECTION DUE TO H. PYLORI: Primary | ICD-10-CM

## 2024-07-17 NOTE — PROGRESS NOTES
Medication Therapy Management (MTM) Encounter    ASSESSMENT:                            Medication Adherence/Access: No issues identified    H. Pylori:  Humera has completed bismuth quadruple therapy with Pylera and omeprazole 40 mg twice daily on 7/16. They will be due for eradication testing no sooner than 4 weeks after completion of their antibiotics (no sooner then August 14th). They should not take PPIs for at least 1-2 weeks prior to eradication testing.     PLAN:                            You will be due for stool testing to make sure the H. Pylori is gone no sooner than 8/14/2024. You must be off PPIs such as omeprazole for at least 1-2 weeks prior to eradication testing. The stool test can be picked up and returned to any [facility] lab. It is ordered under Dr. Guzman.   You can resume taking multivitamins and dairy.     Follow-up: as needed    SUBJECTIVE/OBJECTIVE:                          Humera Lovell is a 43 year old female seen for a follow-up visit.       Reason for visit: H. Pylori follow-up visit.    Allergies/ADRs: Reviewed in chart  Past Medical History: Reviewed in chart  Tobacco: She reports that she has never smoked. She has never used smokeless tobacco.  Alcohol: not currently using      Medication Adherence/Access: no issues reported    H. Pylori:      Met with Humera for H. Pylori follow-up visit, my last visit was on 7/11. At that time she was experiencing mild adverse effects but was continuing to take H. Pylori as directed. She was able to finish H. Pylori medications on 7/16 without interruptions in treatment.     Started regimen on: 7/7/2024  Taking medications as directed: 8:30, 1:30, 6:30 and 10:30 (omeprazole morning and night).   Adverse effects: dizziness, headache, poor appetite   completion date: 7/16  Eradication testing no sooner than: 8/14/2024    ----------------      I spent 20 minutes with this patient today. All changes were made via collaborative practice agreement  with Catia Guzman DO. A copy of the visit note was provided to the patient's provider(s).    A summary of these recommendations was sent via [employer].    Jazmin SchneiderD, BCPS  [facility] Pharmacist   [facility]astroenter[facility]  Phone: [phone]    Telemedicine Visit Details  Type of service:  Telephone visit  Start Time:  1:30 PM  End Time:  1:50 PM     Medication Therapy Recommendations  No medication therapy recommendations to display

## 2024-07-17 NOTE — Clinical Note
"7/17/2024      Humera Lovell  [address]  [address]  [place] [address]      Dear Colleague,    Thank you for referring your patient, Humera Lovell, to the [facility]. Please see a copy of my visit note below.    Medication Therapy Management (MTM) Encounter    ASSESSMENT:                            Medication Adherence/Access: {adherencechoices:649345}    H. Pylori:  ***      PLAN:                            ***    Follow-up: {followuptest2:983002}    SUBJECTIVE/OBJECTIVE:                          Humera Lovell is a 43 year old female seen for {mtmvisit:658064}     Reason for visit: ***.    Allergies/ADRs: Reviewed in chart  Past Medical History: Reviewed in chart  Tobacco: She reports that she has never smoked. She has never used smokeless tobacco.  Alcohol: not currently using  {Social and Goals:229309}    Medication Adherence/Access: {fumedadherence:572795}    H. Pylori:   Pylera 140-125-125 mg Take 3 capsules by mouth four times daily  Omeprazole 40 mg twice daily      Threw up an hour a      Started regimen on: 7/7/2024  Taking medications as directed: 8:30, 1:30, 6:30 and 10:30 (omeprazole morning and night).   Adverse effects: dizziness, headache, poor appetite   Anticipated completion date: 7/16  Eradication testing no sooner than: 8/14/2024    Today's Vitals: There were no vitals taken for this visit.  ----------------  {ALINE?:985263}    I spent {mtm total time 3:394630} with this patient today. { :145684}. A copy of the visit note was provided to the patient's provider(s).    A summary of these recommendations {GIVEN/NOT GIVEN:115119}.    ***    Telemedicine Visit Details  Type of service:  {telemedvisitmtm:056790::\"Telephone visit\"}  Start Time: {video/phone visit start time:152948}  End Time: {video/phone visit end time:152948}     Medication Therapy Recommendations  No medication therapy recommendations to display       Medication Therapy Management (MTM) " Encounter    ASSESSMENT:                            Medication Adherence/Access: No issues identified    H. Pylori:  Humera has completed bismuth quadruple therapy with Pylera and omeprazole 40 mg twice daily on 7/16. They will be due for eradication testing no sooner than 4 weeks after completion of their antibiotics (no sooner then August 14th). They should not take PPIs for at least 1-2 weeks prior to eradication testing.     PLAN:                            You will be due for stool testing to make sure the H. Pylori is gone no sooner than 8/14/2024. You must be off PPIs such as omeprazole for at least 1-2 weeks prior to eradication testing. The stool test can be picked up and returned to any [facility] lab. It is ordered under Dr. Guzman.   You can resume taking multivitamins and dairy.     Follow-up: as needed    SUBJECTIVE/OBJECTIVE:                          Humera Lovell is a 43 year old female seen for a follow-up visit.       Reason for visit: H. Pylori follow-up visit.    Allergies/ADRs: Reviewed in chart  Past Medical History: Reviewed in chart  Tobacco: She reports that she has never smoked. She has never used smokeless tobacco.  Alcohol: not currently using      Medication Adherence/Access: no issues reported    H. Pylori:      Met with Humera for H. Pylori follow-up visit, my last visit was on 7/11. At that time she was experiencing mild adverse effects but was continuing to take H. Pylori as directed. She was able to finish H. Pylori medications on 7/16 without interruptions in treatment.     Started regimen on: 7/7/2024  Taking medications as directed: 8:30, 1:30, 6:30 and 10:30 (omeprazole morning and night).   Adverse effects: dizziness, headache, poor appetite   completion date: 7/16  Eradication testing no sooner than: 8/14/2024    ----------------      I spent 20 minutes with this patient today. All changes were made via collaborative practice agreement with Catia Guzman DO. A copy of  the visit note was provided to the patient's provider(s).    A summary of these recommendations was sent via [employer].    José Miguel Carcamo PharmD, BCPS  [facility] Pharmacist   [facility] Gastroenterology  Phone: [phone]    Telemedicine Visit Details  Type of service:  Telephone visit  Start Time:  1:30 PM  End Time:  1:50 PM     Medication Therapy Recommendations  No medication therapy recommendations to display         Again, thank you for allowing me to participate in the care of your patient.        Sincerely,        José Miguel Carcamo RPH

## 2024-07-23 NOTE — PATIENT INSTRUCTIONS
"Recommendations from today's MTM visit:                                                      You will be due for stool testing to make sure the H. Pylori is gone no sooner than 8/14/2024. You must be off PPIs such as omeprazole for at least 1-2 weeks prior to eradication testing. The stool test can be picked up and returned to any [facility] lab. It is ordered under Dr. Guzman.   You can resume taking multivitamins and dairy.     Follow-up: as needed    It was great speaking with you today.  I value your experience and would be very thankful for your time in providing feedback in our clinic survey. In the next few days, you may receive an email or text message from [employer] with a link to a survey related to your  clinical pharmacist.\"     To schedule another MTM appointment, please call the clinic directly or you may call the MTM scheduling line at [phone] or toll-free at [phone].     My Clinical Pharmacist's contact information:                                                      Please feel free to contact me with any questions or concerns you have.      José Miguel Carcamo, PharmD, BCPS  MTM Pharmacist   [facility] Gastroenterology  Phone: [phone]   "

## 2024-07-23 NOTE — PATIENT INSTRUCTIONS
"Recommendations from today's MTM visit:                                                      Continue taking your Pylera and omeprazole as directed.    Follow-up: 7/17/24 at 1:30 PM (telephone)    It was great speaking with you today.  I value your experience and would be very thankful for your time in providing feedback in our clinic survey. In the next few days, you may receive an email or text message from [facility] [employer] with a link to a survey related to your  clinical pharmacist.\"     To schedule another MTM appointment, please call the clinic directly or you may call the MTM scheduling line at [phone] or toll-free at [phone].     My Clinical Pharmacist's contact information:                                                      Please feel free to contact me with any questions or concerns you have.      José Miguel Carcamo, PharmD, BCPS  MTM Pharmacist   [facility]  Phone: [phone]   "

## 2024-08-03 ENCOUNTER — APPOINTMENT (OUTPATIENT)
Dept: CT IMAGING | Facility: CLINIC | Age: 44
End: 2024-08-03
Payer: COMMERCIAL

## 2024-08-03 ENCOUNTER — HOSPITAL ENCOUNTER (EMERGENCY)
Facility: CLINIC | Age: 44
Discharge: HOME | End: 2024-08-03
Payer: COMMERCIAL

## 2024-08-03 VITALS
HEART RATE: 57 BPM | BODY MASS INDEX: 22.49 KG/M2 | SYSTOLIC BLOOD PRESSURE: 146 MMHG | OXYGEN SATURATION: 100 % | TEMPERATURE: 97 F | DIASTOLIC BLOOD PRESSURE: 93 MMHG | RESPIRATION RATE: 16 BRPM | WEIGHT: 143.3 LBS | HEIGHT: 67 IN

## 2024-08-03 DIAGNOSIS — R10.9 ABDOMINAL PAIN: ICD-10-CM

## 2024-08-03 DIAGNOSIS — R11.10 HYPEREMESIS: ICD-10-CM

## 2024-08-03 LAB
ALBUMIN SERPL BCG-MCNC: 4.6 G/DL (ref 3.5–5.2)
ALBUMIN UR-MCNC: NEGATIVE MG/DL
ALP SERPL-CCNC: 67 U/L (ref 40–150)
ALT SERPL W P-5'-P-CCNC: 39 U/L (ref 0–50)
ANION GAP SERPL CALCULATED.3IONS-SCNC: 23 MMOL/L (ref 7–15)
APPEARANCE UR: CLEAR
AST SERPL W P-5'-P-CCNC: 41 U/L (ref 0–45)
BASOPHILS # BLD AUTO: 0 10E3/UL (ref 0–0.2)
BASOPHILS NFR BLD AUTO: 0 %
BILIRUB SERPL-MCNC: 0.3 MG/DL
BILIRUB UR QL STRIP: NEGATIVE
BUN SERPL-MCNC: 13.8 MG/DL (ref 6–20)
CALCIUM SERPL-MCNC: 10.1 MG/DL (ref 8.8–10.4)
CHLORIDE SERPL-SCNC: 97 MMOL/L (ref 98–107)
COLOR UR AUTO: ABNORMAL
CREAT SERPL-MCNC: 0.69 MG/DL (ref 0.51–0.95)
EGFRCR SERPLBLD CKD-EPI 2021: >90 ML/MIN/1.73M2
EOSINOPHIL # BLD AUTO: 0.1 10E3/UL (ref 0–0.7)
EOSINOPHIL NFR BLD AUTO: 1 %
ERYTHROCYTE [DISTWIDTH] IN BLOOD BY AUTOMATED COUNT: 11.5 % (ref 10–15)
GLUCOSE SERPL-MCNC: 135 MG/DL (ref 70–99)
GLUCOSE UR STRIP-MCNC: NEGATIVE MG/DL
HCG SERPL QL: NEGATIVE
HCO3 SERPL-SCNC: 17 MMOL/L (ref 22–29)
HCT VFR BLD AUTO: 36.1 % (ref 35–47)
HGB BLD-MCNC: 12.9 G/DL (ref 11.7–15.7)
HGB UR QL STRIP: NEGATIVE
HOLD SPECIMEN: NORMAL
IMM GRANULOCYTES # BLD: 0 10E3/UL
IMM GRANULOCYTES NFR BLD: 0 %
KETONES UR STRIP-MCNC: 80 MG/DL
LEUKOCYTE ESTERASE UR QL STRIP: ABNORMAL
LIPASE SERPL-CCNC: 30 U/L (ref 13–60)
LYMPHOCYTES # BLD AUTO: 2.5 10E3/UL (ref 0.8–5.3)
LYMPHOCYTES NFR BLD AUTO: 26 %
MAGNESIUM SERPL-MCNC: 1.6 MG/DL (ref 1.7–2.3)
MCH RBC QN AUTO: 35.5 PG (ref 26.5–33)
MCHC RBC AUTO-ENTMCNC: 35.7 G/DL (ref 31.5–36.5)
MCV RBC AUTO: 99 FL (ref 78–100)
MONOCYTES # BLD AUTO: 0.6 10E3/UL (ref 0–1.3)
MONOCYTES NFR BLD AUTO: 7 %
MUCOUS THREADS #/AREA URNS LPF: PRESENT /LPF
NEUTROPHILS # BLD AUTO: 6.3 10E3/UL (ref 1.6–8.3)
NEUTROPHILS NFR BLD AUTO: 65 %
NITRATE UR QL: NEGATIVE
NRBC # BLD AUTO: 0 10E3/UL
NRBC BLD AUTO-RTO: 0 /100
PH UR STRIP: 6 [PH] (ref 5–7)
PLATELET # BLD AUTO: 362 10E3/UL (ref 150–450)
POTASSIUM SERPL-SCNC: 3 MMOL/L (ref 3.4–5.3)
PROT SERPL-MCNC: 8.4 G/DL (ref 6.4–8.3)
RBC # BLD AUTO: 3.63 10E6/UL (ref 3.8–5.2)
RBC URINE: <1 /HPF
SODIUM SERPL-SCNC: 137 MMOL/L (ref 135–145)
SP GR UR STRIP: 1.03 (ref 1–1.03)
SQUAMOUS EPITHELIAL: 1 /HPF
TROPONIN T SERPL HS-MCNC: 6 NG/L
UROBILINOGEN UR STRIP-MCNC: NORMAL MG/DL
WBC # BLD AUTO: 9.6 10E3/UL (ref 4–11)
WBC URINE: 2 /HPF

## 2024-08-03 PROCEDURE — 93005 ELECTROCARDIOGRAM TRACING: CPT

## 2024-08-03 PROCEDURE — 96375 TX/PRO/DX INJ NEW DRUG ADDON: CPT

## 2024-08-03 PROCEDURE — 96376 TX/PRO/DX INJ SAME DRUG ADON: CPT | Mod: 59

## 2024-08-03 PROCEDURE — 250N000011 HC RX IP 250 OP 636

## 2024-08-03 PROCEDURE — 83735 ASSAY OF MAGNESIUM: CPT

## 2024-08-03 PROCEDURE — 250N000013 HC RX MED GY IP 250 OP 250 PS 637

## 2024-08-03 PROCEDURE — 36415 COLL VENOUS BLD VENIPUNCTURE: CPT

## 2024-08-03 PROCEDURE — 71260 CT THORAX DX C+: CPT

## 2024-08-03 PROCEDURE — 99285 EMERGENCY DEPT VISIT HI MDM: CPT | Mod: 25

## 2024-08-03 PROCEDURE — 84484 ASSAY OF TROPONIN QUANT: CPT

## 2024-08-03 PROCEDURE — 83690 ASSAY OF LIPASE: CPT

## 2024-08-03 PROCEDURE — 85025 COMPLETE CBC W/AUTO DIFF WBC: CPT

## 2024-08-03 PROCEDURE — 250N000009 HC RX 250

## 2024-08-03 PROCEDURE — 96361 HYDRATE IV INFUSION ADD-ON: CPT | Mod: 59

## 2024-08-03 PROCEDURE — 96365 THER/PROPH/DIAG IV INF INIT: CPT | Mod: 59

## 2024-08-03 PROCEDURE — 258N000003 HC RX IP 258 OP 636

## 2024-08-03 PROCEDURE — 87086 URINE CULTURE/COLONY COUNT: CPT

## 2024-08-03 PROCEDURE — 82040 ASSAY OF SERUM ALBUMIN: CPT

## 2024-08-03 PROCEDURE — 81001 URINALYSIS AUTO W/SCOPE: CPT

## 2024-08-03 PROCEDURE — 84703 CHORIONIC GONADOTROPIN ASSAY: CPT

## 2024-08-03 RX ORDER — MAGNESIUM HYDROXIDE/ALUMINUM HYDROXICE/SIMETHICONE 120; 1200; 1200 MG/30ML; MG/30ML; MG/30ML
30 SUSPENSION ORAL ONCE
Status: COMPLETED | OUTPATIENT
Start: 2024-08-03 | End: 2024-08-03

## 2024-08-03 RX ORDER — ONDANSETRON 2 MG/ML
4 INJECTION INTRAMUSCULAR; INTRAVENOUS ONCE
Status: COMPLETED | OUTPATIENT
Start: 2024-08-03 | End: 2024-08-03

## 2024-08-03 RX ORDER — ONDANSETRON 4 MG/1
4 TABLET, ORALLY DISINTEGRATING ORAL EVERY 8 HOURS PRN
Qty: 10 TABLET | Refills: 0 | Status: SHIPPED | OUTPATIENT
Start: 2024-08-03 | End: 2024-08-06

## 2024-08-03 RX ORDER — DROPERIDOL 2.5 MG/ML
1.25 INJECTION, SOLUTION INTRAMUSCULAR; INTRAVENOUS ONCE
Status: COMPLETED | OUTPATIENT
Start: 2024-08-03 | End: 2024-08-03

## 2024-08-03 RX ORDER — IOPAMIDOL 755 MG/ML
71 INJECTION, SOLUTION INTRAVASCULAR ONCE
Status: COMPLETED | OUTPATIENT
Start: 2024-08-03 | End: 2024-08-03

## 2024-08-03 RX ORDER — POTASSIUM CHLORIDE 7.45 MG/ML
10 INJECTION INTRAVENOUS ONCE
Status: COMPLETED | OUTPATIENT
Start: 2024-08-03 | End: 2024-08-03

## 2024-08-03 RX ORDER — ONDANSETRON 2 MG/ML
4 INJECTION INTRAMUSCULAR; INTRAVENOUS EVERY 30 MIN PRN
Status: DISCONTINUED | OUTPATIENT
Start: 2024-08-03 | End: 2024-08-04 | Stop reason: HOSPADM

## 2024-08-03 RX ORDER — KETOROLAC TROMETHAMINE 15 MG/ML
15 INJECTION, SOLUTION INTRAMUSCULAR; INTRAVENOUS ONCE
Status: COMPLETED | OUTPATIENT
Start: 2024-08-03 | End: 2024-08-03

## 2024-08-03 RX ADMIN — POTASSIUM CHLORIDE 10 MEQ: 7.46 INJECTION, SOLUTION INTRAVENOUS at 18:41

## 2024-08-03 RX ADMIN — DROPERIDOL 1.25 MG: 2.5 INJECTION, SOLUTION INTRAMUSCULAR; INTRAVENOUS at 18:31

## 2024-08-03 RX ADMIN — ONDANSETRON 4 MG: 2 INJECTION INTRAMUSCULAR; INTRAVENOUS at 17:08

## 2024-08-03 RX ADMIN — SODIUM CHLORIDE 1000 ML: 9 INJECTION, SOLUTION INTRAVENOUS at 17:25

## 2024-08-03 RX ADMIN — IOPAMIDOL 71 ML: 755 INJECTION, SOLUTION INTRAVENOUS at 19:56

## 2024-08-03 RX ADMIN — SODIUM CHLORIDE 58 ML: 9 INJECTION, SOLUTION INTRAVENOUS at 19:58

## 2024-08-03 RX ADMIN — DROPERIDOL 1.25 MG: 2.5 INJECTION, SOLUTION INTRAMUSCULAR; INTRAVENOUS at 21:59

## 2024-08-03 RX ADMIN — ONDANSETRON 4 MG: 2 INJECTION INTRAMUSCULAR; INTRAVENOUS at 21:24

## 2024-08-03 RX ADMIN — PANTOPRAZOLE SODIUM 40 MG: 40 INJECTION, POWDER, FOR SOLUTION INTRAVENOUS at 18:41

## 2024-08-03 RX ADMIN — ALUMINUM HYDROXIDE, MAGNESIUM HYDROXIDE, AND SIMETHICONE 30 ML: 1200; 120; 1200 SUSPENSION ORAL at 21:24

## 2024-08-03 RX ADMIN — KETOROLAC TROMETHAMINE 15 MG: 15 INJECTION, SOLUTION INTRAMUSCULAR; INTRAVENOUS at 21:23

## 2024-08-03 ASSESSMENT — COLUMBIA-SUICIDE SEVERITY RATING SCALE - C-SSRS
2. HAVE YOU ACTUALLY HAD ANY THOUGHTS OF KILLING YOURSELF IN THE PAST MONTH?: NO
6. HAVE YOU EVER DONE ANYTHING, STARTED TO DO ANYTHING, OR PREPARED TO DO ANYTHING TO END YOUR LIFE?: NO
1. IN THE PAST MONTH, HAVE YOU WISHED YOU WERE DEAD OR WISHED YOU COULD GO TO SLEEP AND NOT WAKE UP?: NO

## 2024-08-03 ASSESSMENT — ACTIVITIES OF DAILY LIVING (ADL)
ADLS_ACUITY_SCORE: 33
ADLS_ACUITY_SCORE: 35

## 2024-08-03 NOTE — ED PROVIDER NOTES
"  Emergency Department Note      History of Present Illness     Chief Complaint   Abdominal Pain    HPI   Humera Lovell is a 43 year old female with a history of NSTEMI, chronic gastritis, and intractable vomiting who presents to the ED for evaluation of abdominal pain. The patient reports that she developed sharp upper middle abdominal pain at 1 pm this afternoon. Shortly after she starting having chills, nausea, and vomiting. She notes having slight tightness in the middle of her chest. She has had episodes like this before, but they have never been this severe. She states that she occasional uses marijuana at night to help her sleep. She denies that she could be pregnant. The patient denies having back pain, lower abdominal pain, shortness of breath, or allergies to medications. She denies hemoptysis, leg swelling, recent surgeries or hospitalizations, personal or familial history of blood clots, recent long travels, or hormone use.     Independent Historian   None    Review of External Notes   Endoscopy performed on 5/21/2024, esophageal mucosal changes suspicious for short segment Espinosa's esophagus, normal stomach, normal examined duodenum.  Previous history of H. pylori.    Past Medical History     Medical History and Problem List   Anxiety  Major depressive disorder   Insomnia  Epigastric pain  NSTEMI   Chronic gastritis  Intractable vomiting with nausea  HPV test positive  Bilateral femur fracture  Ankle fracture    Medications   The patient is not currently taking any prescribed medications.    Surgical History   Colonoscopy with CO2 insufflation  EGD X 2  Coronary angiogram  Gallbladder surgery  Bilateral femur surgery    Physical Exam     Patient Vitals for the past 24 hrs:   BP Temp Temp src Pulse Resp SpO2 Height Weight   08/03/24 1730 -- -- -- -- -- 100 % -- --   08/03/24 1728 (!) 146/93 -- -- 57 -- -- -- --   08/03/24 1642 (!) 126/97 97  F (36.1  C) Temporal 96 16 100 % 1.702 m (5' 7\") 65 kg (143 lb " 4.8 oz)     Physical Exam  General: Alert, well developed, well nourished. Cooperative.     In significant distress, constantly vomiting/dry heaving.   HEENT:  Head:  Atraumatic  Ears:  External ears are normal  Eyes:   Conjunctivae normal and EOM are normal. No scleral icterus.    Pupils are equal, round, and reactive to light.   Neck:   Normal range of motion. Neck supple.  CV:  Normal rate, regular rhythm, normal heart sounds and radial pulses are 2+ and symmetric.  No murmur.  Resp:  Breath sounds are clear bilaterally    Non-labored, no retractions or accessory muscle use  GI:  TTP over the epigastrium and periumbilical regions. Abdomen is soft, no distension. No rebound or guarding.  No CVA tenderness bilaterally  MS:  Normal range of motion.    Back atraumatic.    No midline cervical, thoracic, or lumbar tenderness  Skin:  Warm and dry.  No rash or lesions noted.  Neuro:   Alert. Normal strength.    Psych: Normal mood and affect.    Diagnostics     Lab Results   Labs Ordered and Resulted from Time of ED Arrival to Time of ED Departure   COMPREHENSIVE METABOLIC PANEL - Abnormal       Result Value    Sodium 137      Potassium 3.0 (*)     Carbon Dioxide (CO2) 17 (*)     Anion Gap 23 (*)     Urea Nitrogen 13.8      Creatinine 0.69      GFR Estimate >90      Calcium 10.1      Chloride 97 (*)     Glucose 135 (*)     Alkaline Phosphatase 67      AST 41      ALT 39      Protein Total 8.4 (*)     Albumin 4.6      Bilirubin Total 0.3     ROUTINE UA WITH MICROSCOPIC REFLEX TO CULTURE - Abnormal    Color Urine Light Yellow      Appearance Urine Clear      Glucose Urine Negative      Bilirubin Urine Negative      Ketones Urine 80 (*)     Specific Gravity Urine 1.032      Blood Urine Negative      pH Urine 6.0      Protein Albumin Urine Negative      Urobilinogen Urine Normal      Nitrite Urine Negative      Leukocyte Esterase Urine Moderate (*)     Mucus Urine Present (*)     RBC Urine <1      WBC Urine 2      Squamous  Epithelials Urine 1     CBC WITH PLATELETS AND DIFFERENTIAL - Abnormal    WBC Count 9.6      RBC Count 3.63 (*)     Hemoglobin 12.9      Hematocrit 36.1      MCV 99      MCH 35.5 (*)     MCHC 35.7      RDW 11.5      Platelet Count 362      % Neutrophils 65      % Lymphocytes 26      % Monocytes 7      % Eosinophils 1      % Basophils 0      % Immature Granulocytes 0      NRBCs per 100 WBC 0      Absolute Neutrophils 6.3      Absolute Lymphocytes 2.5      Absolute Monocytes 0.6      Absolute Eosinophils 0.1      Absolute Basophils 0.0      Absolute Immature Granulocytes 0.0      Absolute NRBCs 0.0     MAGNESIUM - Abnormal    Magnesium 1.6 (*)    LIPASE - Normal    Lipase 30     TROPONIN T, HIGH SENSITIVITY - Normal    Troponin T, High Sensitivity 6     HCG QUALITATIVE PREGNANCY - Normal    hCG Serum Qualitative Negative     URINE CULTURE     Imaging   CT Chest/Abdomen/Pelvis w Contrast   Final Result   IMPRESSION:   1.  Lungs are clear.   2.  Cholecystectomy. No evidence for biliary obstruction or inflammation.   3.  No evidence for bowel obstruction. The appendix is normal.   4.  Uterine fibroid.        EKG   ECG taken at 1722, ECG read at 1727  Sinus rhythm  Normal ECG   No significant change as compared to prior, dated 6/19/2023.  Rate 61 bpm. MO interval 138 ms. QRS duration 88 ms. QT/QTc 452/455 ms. P-R-T axes 67 64 38.    Independent Interpretation   None    ED Course      Medications Administered   Medications   ondansetron (ZOFRAN) injection 4 mg (4 mg Intravenous $Given 8/3/24 2124)   ondansetron (ZOFRAN) injection 4 mg (4 mg Intravenous $Given 8/3/24 1708)   sodium chloride 0.9% BOLUS 1,000 mL (0 mLs Intravenous Stopped 8/3/24 1946)   potassium chloride 10 mEq in 100 mL sterile water infusion (0 mEq Intravenous Stopped 8/3/24 1946)   droPERidol (INAPSINE) injection 1.25 mg (1.25 mg Intravenous $Given 8/3/24 1831)   pantoprazole (PROTONIX) IV push injection 40 mg (40 mg Intravenous $Given 8/3/24 1841)    droPERidol (INAPSINE) injection 1.25 mg (1.25 mg Intravenous $Given 8/3/24 2159)   iopamidol (ISOVUE-370) solution 71 mL (71 mLs Intravenous $Given 8/3/24 1956)   sodium chloride 0.9 % bag 500mL for CT scan flush use (58 mLs Intravenous $Given 8/3/24 1958)   ketorolac (TORADOL) injection 15 mg (15 mg Intravenous $Given 8/3/24 2123)   alum & mag hydroxide-simethicone (MAALOX) suspension 30 mL (30 mLs Oral $Given 8/3/24 2124)     Discussion of Management   None    ED Course   ED Course as of 08/03/24 2249   Sat Aug 03, 2024   1650 I obtained history and examined the patient as noted above.    1909 I rechecked the patient and she is feeling a lot better.    2248 I rechecked the patient and explained findings. She is still uncomfortable, but feeling better. We discussed admission, but she prefers to go home.      Additional Documentation  None    Medical Decision Making / Diagnosis     TEDDY Lovell is a 43 year old female with a history of NSTEMI, chronic gastric, and intractable vomiting who presents to the ED for evaluation of abdominal pain.  On initial exam, the patient has abdominal tenderness and intractable vomiting.  Vital signs show mildly elevated blood pressure without fever, tachycardia, or hypoxia.  Blood work shows mild hypokalemia without leukocytosis or other concerning electrolyte abnormalities.  Magnesium is minimally decreased.  Lipase is normal.  UA shows no evidence of infection.  The patient is not pregnant.  Troponin is normal.  EKG shows no evidence of arrhythmia or ischemia.  The patient is PERC negative. CT of the chest, abdomen, and pelvis was obtained given significant abdominal pain and profuse vomiting which fortunately shows no acute intrathoracic or intra-abdominal pathology.  She is status postcholecystectomy.  We provided the patient with fluids, Zofran, droperidol, Toradol, Maalox, and pantoprazole with symptomatic improvement.  She was also given potassium via IV.  Upon  recheck, the patient continued to have discomfort over her abdomen, but felt overall improved.  I discussed with the patient  the importance of waiting for 2 hours following most recent droperidol dose, however, she would like to discharge prior to this.  I also discussed the option for admission given that symptoms had not been completely controlled in the emergency department, however, the patient felt that she would be more comfortable at home and could manage symptoms there with oral medications.  She was able to drink water without recurrent vomiting.  I recommended the patient resume a PPI for abdominal pain and she was also given a prescription for Zofran to take as needed for nausea and vomiting.  She was advised to follow-up with her primary care provider soon as she is able to for reevaluation and ongoing management of symptoms.  I advised her to avoid cannabis as well to prevent recurrence.  we discussed that she should continue to monitor her symptoms very closely and return for fever, worsening pain, recurrent vomiting, hematemesis, syncope, bloody stool, or any other new concerns.  The patient was comfortable with this plan and all questions were answered.    Disposition   The patient was discharged.     Diagnosis     ICD-10-CM    1. Hyperemesis  R11.10       2. Abdominal pain  R10.9         Discharge Medications   New Prescriptions    OMEPRAZOLE (PRILOSEC) 20 MG DR CAPSULE    Take 1 capsule (20 mg) by mouth daily for 30 days    ONDANSETRON (ZOFRAN ODT) 4 MG ODT TAB    Take 1 tablet (4 mg) by mouth every 8 hours as needed for nausea or vomiting     Scribe Disclosure:  I, Beni Cage, am serving as a scribe at 5:49 PM on 8/3/2024 to document services personally performed by Katia Orourke PA-C, based on my observations and the provider's statements to me.        Katia Orourke PA-C  08/03/24 [id number]

## 2024-08-03 NOTE — ED NOTES
GastrointestinalGastrointestinal WDL:  (pt comes in with N/V/abd pain started about 4 hours PTA. last BM was 2 hours ago and was soft and normal.)Last Bowel Movement: 08/03/24

## 2024-08-03 NOTE — ED TRIAGE NOTES
Patient reports sharp abdominal pain and N/V that began this afternoon.  ABCs intact, A&Ox4.     Triage Assessment (Adult)       Row Name 08/03/24 1649          Triage Assessment    Airway WDL WDL        Respiratory WDL    Respiratory WDL WDL        Skin Circulation/Temperature WDL    Skin Circulation/Temperature WDL WDL        Cardiac WDL    Cardiac WDL WDL        Peripheral/Neurovascular WDL    Peripheral Neurovascular WDL WDL        Cognitive/Neuro/Behavioral WDL    Cognitive/Neuro/Behavioral WDL WDL

## 2024-08-04 NOTE — ED NOTES
Pt states she is feeling better, nausea and pain are improved. Pt giving thumbs up while laying in bed.

## 2024-08-05 ENCOUNTER — TELEPHONE (OUTPATIENT)
Dept: INTERNAL MEDICINE | Facility: CLINIC | Age: 44
End: 2024-08-05
Payer: COMMERCIAL

## 2024-08-05 DIAGNOSIS — G47.00 INSOMNIA, UNSPECIFIED TYPE: Primary | ICD-10-CM

## 2024-08-05 LAB
ATRIAL RATE - MUSE: 61 BPM
BACTERIA UR CULT: NORMAL
DIASTOLIC BLOOD PRESSURE - MUSE: NORMAL MMHG
INTERPRETATION ECG - MUSE: NORMAL
P AXIS - MUSE: 67 DEGREES
PR INTERVAL - MUSE: 138 MS
QRS DURATION - MUSE: 88 MS
QT - MUSE: 452 MS
QTC - MUSE: 455 MS
R AXIS - MUSE: 64 DEGREES
SYSTOLIC BLOOD PRESSURE - MUSE: NORMAL MMHG
T AXIS - MUSE: 38 DEGREES
VENTRICULAR RATE- MUSE: 61 BPM

## 2024-08-05 RX ORDER — ZOLPIDEM TARTRATE 5 MG/1
5 TABLET ORAL
Qty: 5 TABLET | Refills: 0 | Status: SHIPPED | OUTPATIENT
Start: 2024-08-05 | End: 2024-08-09

## 2024-08-05 NOTE — TELEPHONE ENCOUNTER
Patient calls and is vomiting while on the call.  Patient was seen in the ED for gastritis.    Patient states she is unable to sleep more than 5 hours at a time and that she has taken tons of Trazodone and that just made her nausea and vomiting worse because she tried to take so many to try to get some sleep. Patient is requesting an RX for Ambien be sent to her [facility] so that she can get some sleep.

## 2024-08-05 NOTE — TELEPHONE ENCOUNTER
Son calls back with pt present.    Took Trazodone twice yesterday. Took OTC sleep aide last night. She is not sleeping.   Has a headache and nausea.   Did not take any Trazodone today.     She takes the Zofran is helping but still nauseated. Vomited the omeprazole.     Tried Tums, a little better, but still nauseated.     She is asking about Ambien just for short term, a few tablets, #3-5.     Scheduled pt with Beatriz on Friday. Please advise.

## 2024-08-05 NOTE — RESULT ENCOUNTER NOTE
Final urine culture report is negative.  Adult: Negative urine culture parameters per protocol: Any # urogenital allen, single or mixed   [facility] Emergency Dept discharge antibiotic prescribed (If applicable): None  Treatment recommendations per [facility] ED Lab Result Urine Culture protocol: No change in plan of care.

## 2024-08-05 NOTE — TELEPHONE ENCOUNTER
I will submit a prescription for Ambien 5 mg tablets with instructions 1 tablet mouth in the evening for sleep.  She will receive 5 tablets.  Patient can discuss further use of this medication with her PCP at her upcoming appointment.

## 2024-08-06 ENCOUNTER — APPOINTMENT (OUTPATIENT)
Dept: CT IMAGING | Facility: CLINIC | Age: 44
End: 2024-08-06
Attending: EMERGENCY MEDICINE
Payer: COMMERCIAL

## 2024-08-06 ENCOUNTER — NURSE TRIAGE (OUTPATIENT)
Dept: INTERNAL MEDICINE | Facility: CLINIC | Age: 44
End: 2024-08-06
Payer: COMMERCIAL

## 2024-08-06 ENCOUNTER — HOSPITAL ENCOUNTER (EMERGENCY)
Facility: CLINIC | Age: 44
Discharge: HOME | End: 2024-08-06
Attending: EMERGENCY MEDICINE | Admitting: EMERGENCY MEDICINE
Payer: COMMERCIAL

## 2024-08-06 VITALS
RESPIRATION RATE: 16 BRPM | HEART RATE: 68 BPM | HEIGHT: 65 IN | SYSTOLIC BLOOD PRESSURE: 139 MMHG | DIASTOLIC BLOOD PRESSURE: 76 MMHG | BODY MASS INDEX: 23.82 KG/M2 | TEMPERATURE: 99.3 F | OXYGEN SATURATION: 99 % | WEIGHT: 143 LBS

## 2024-08-06 DIAGNOSIS — R51.9 NONINTRACTABLE HEADACHE, UNSPECIFIED CHRONICITY PATTERN, UNSPECIFIED HEADACHE TYPE: ICD-10-CM

## 2024-08-06 DIAGNOSIS — R11.2 INTRACTABLE NAUSEA AND VOMITING: ICD-10-CM

## 2024-08-06 DIAGNOSIS — G47.09 OTHER INSOMNIA: ICD-10-CM

## 2024-08-06 LAB
ALBUMIN SERPL BCG-MCNC: 4.9 G/DL (ref 3.5–5.2)
ALBUMIN UR-MCNC: 100 MG/DL
ALP SERPL-CCNC: 64 U/L (ref 40–150)
ALT SERPL W P-5'-P-CCNC: 39 U/L (ref 0–50)
ANION GAP SERPL CALCULATED.3IONS-SCNC: 16 MMOL/L (ref 7–15)
ANION GAP SERPL CALCULATED.3IONS-SCNC: 17 MMOL/L (ref 7–15)
APPEARANCE UR: ABNORMAL
AST SERPL W P-5'-P-CCNC: ABNORMAL U/L
BACTERIA #/AREA URNS HPF: ABNORMAL /HPF
BASOPHILS # BLD AUTO: 0 10E3/UL (ref 0–0.2)
BASOPHILS NFR BLD AUTO: 0 %
BILIRUB SERPL-MCNC: 0.7 MG/DL
BILIRUB UR QL STRIP: NEGATIVE
BUN SERPL-MCNC: 10.4 MG/DL (ref 6–20)
BUN SERPL-MCNC: 11.5 MG/DL (ref 6–20)
CALCIUM SERPL-MCNC: 8.9 MG/DL (ref 8.8–10.4)
CALCIUM SERPL-MCNC: 9.8 MG/DL (ref 8.8–10.4)
CHLORIDE SERPL-SCNC: 88 MMOL/L (ref 98–107)
CHLORIDE SERPL-SCNC: 91 MMOL/L (ref 98–107)
COLOR UR AUTO: YELLOW
CREAT SERPL-MCNC: 0.67 MG/DL (ref 0.51–0.95)
CREAT SERPL-MCNC: 0.72 MG/DL (ref 0.51–0.95)
EGFRCR SERPLBLD CKD-EPI 2021: >90 ML/MIN/1.73M2
EGFRCR SERPLBLD CKD-EPI 2021: >90 ML/MIN/1.73M2
EOSINOPHIL # BLD AUTO: 0 10E3/UL (ref 0–0.7)
EOSINOPHIL NFR BLD AUTO: 0 %
ERYTHROCYTE [DISTWIDTH] IN BLOOD BY AUTOMATED COUNT: 11 % (ref 10–15)
FLUAV RNA SPEC QL NAA+PROBE: NEGATIVE
FLUBV RNA RESP QL NAA+PROBE: NEGATIVE
GLUCOSE SERPL-MCNC: 112 MG/DL (ref 70–99)
GLUCOSE SERPL-MCNC: 125 MG/DL (ref 70–99)
GLUCOSE UR STRIP-MCNC: NEGATIVE MG/DL
HCG UR QL: NEGATIVE
HCO3 SERPL-SCNC: 20 MMOL/L (ref 22–29)
HCO3 SERPL-SCNC: 22 MMOL/L (ref 22–29)
HCT VFR BLD AUTO: 39.3 % (ref 35–47)
HGB BLD-MCNC: 14.4 G/DL (ref 11.7–15.7)
HGB UR QL STRIP: ABNORMAL
HYALINE CASTS: 3 /LPF
IMM GRANULOCYTES # BLD: 0 10E3/UL
IMM GRANULOCYTES NFR BLD: 0 %
KETONES UR STRIP-MCNC: 40 MG/DL
LEUKOCYTE ESTERASE UR QL STRIP: NEGATIVE
LIPASE SERPL-CCNC: 17 U/L (ref 13–60)
LYMPHOCYTES # BLD AUTO: 1.2 10E3/UL (ref 0.8–5.3)
LYMPHOCYTES NFR BLD AUTO: 18 %
MCH RBC QN AUTO: 35 PG (ref 26.5–33)
MCHC RBC AUTO-ENTMCNC: 36.6 G/DL (ref 31.5–36.5)
MCV RBC AUTO: 96 FL (ref 78–100)
MONOCYTES # BLD AUTO: 0.5 10E3/UL (ref 0–1.3)
MONOCYTES NFR BLD AUTO: 8 %
MUCOUS THREADS #/AREA URNS LPF: PRESENT /LPF
NEUTROPHILS # BLD AUTO: 4.8 10E3/UL (ref 1.6–8.3)
NEUTROPHILS NFR BLD AUTO: 73 %
NITRATE UR QL: NEGATIVE
NRBC # BLD AUTO: 0 10E3/UL
NRBC BLD AUTO-RTO: 0 /100
PH UR STRIP: 6 [PH] (ref 5–7)
PLATELET # BLD AUTO: 375 10E3/UL (ref 150–450)
POTASSIUM SERPL-SCNC: 3.4 MMOL/L (ref 3.4–5.3)
POTASSIUM SERPL-SCNC: 3.8 MMOL/L (ref 3.4–5.3)
PROT SERPL-MCNC: 8.8 G/DL (ref 6.4–8.3)
RBC # BLD AUTO: 4.11 10E6/UL (ref 3.8–5.2)
RBC URINE: 6 /HPF
RSV RNA SPEC NAA+PROBE: NEGATIVE
SARS-COV-2 RNA RESP QL NAA+PROBE: NEGATIVE
SODIUM SERPL-SCNC: 126 MMOL/L (ref 135–145)
SODIUM SERPL-SCNC: 128 MMOL/L (ref 135–145)
SP GR UR STRIP: 1.03 (ref 1–1.03)
SQUAMOUS EPITHELIAL: 1 /HPF
UROBILINOGEN UR STRIP-MCNC: NORMAL MG/DL
WBC # BLD AUTO: 6.5 10E3/UL (ref 4–11)
WBC URINE: 3 /HPF

## 2024-08-06 PROCEDURE — 81025 URINE PREGNANCY TEST: CPT | Performed by: EMERGENCY MEDICINE

## 2024-08-06 PROCEDURE — 250N000011 HC RX IP 250 OP 636: Performed by: EMERGENCY MEDICINE

## 2024-08-06 PROCEDURE — 36415 COLL VENOUS BLD VENIPUNCTURE: CPT | Performed by: EMERGENCY MEDICINE

## 2024-08-06 PROCEDURE — 80048 BASIC METABOLIC PNL TOTAL CA: CPT | Performed by: EMERGENCY MEDICINE

## 2024-08-06 PROCEDURE — 81001 URINALYSIS AUTO W/SCOPE: CPT | Performed by: EMERGENCY MEDICINE

## 2024-08-06 PROCEDURE — 85025 COMPLETE CBC W/AUTO DIFF WBC: CPT | Performed by: EMERGENCY MEDICINE

## 2024-08-06 PROCEDURE — 258N000003 HC RX IP 258 OP 636: Performed by: EMERGENCY MEDICINE

## 2024-08-06 PROCEDURE — 99285 EMERGENCY DEPT VISIT HI MDM: CPT | Mod: 25

## 2024-08-06 PROCEDURE — 83690 ASSAY OF LIPASE: CPT | Performed by: EMERGENCY MEDICINE

## 2024-08-06 PROCEDURE — 96375 TX/PRO/DX INJ NEW DRUG ADDON: CPT

## 2024-08-06 PROCEDURE — 96374 THER/PROPH/DIAG INJ IV PUSH: CPT

## 2024-08-06 PROCEDURE — 70450 CT HEAD/BRAIN W/O DYE: CPT

## 2024-08-06 PROCEDURE — 87637 SARSCOV2&INF A&B&RSV AMP PRB: CPT | Performed by: EMERGENCY MEDICINE

## 2024-08-06 PROCEDURE — 96361 HYDRATE IV INFUSION ADD-ON: CPT

## 2024-08-06 PROCEDURE — 84155 ASSAY OF PROTEIN SERUM: CPT | Performed by: EMERGENCY MEDICINE

## 2024-08-06 RX ORDER — METOCLOPRAMIDE HYDROCHLORIDE 5 MG/ML
10 INJECTION INTRAMUSCULAR; INTRAVENOUS ONCE
Status: COMPLETED | OUTPATIENT
Start: 2024-08-06 | End: 2024-08-06

## 2024-08-06 RX ORDER — DIPHENHYDRAMINE HYDROCHLORIDE 50 MG/ML
25 INJECTION INTRAMUSCULAR; INTRAVENOUS ONCE
Status: COMPLETED | OUTPATIENT
Start: 2024-08-06 | End: 2024-08-06

## 2024-08-06 RX ORDER — ONDANSETRON 2 MG/ML
4 INJECTION INTRAMUSCULAR; INTRAVENOUS EVERY 30 MIN PRN
Status: DISCONTINUED | OUTPATIENT
Start: 2024-08-06 | End: 2024-08-06 | Stop reason: HOSPADM

## 2024-08-06 RX ORDER — PROMETHAZINE HYDROCHLORIDE 25 MG/1
25 TABLET ORAL EVERY 6 HOURS PRN
Qty: 12 TABLET | Refills: 0 | Status: SHIPPED | OUTPATIENT
Start: 2024-08-06 | End: 2024-08-09

## 2024-08-06 RX ADMIN — METOCLOPRAMIDE 10 MG: 5 INJECTION, SOLUTION INTRAMUSCULAR; INTRAVENOUS at 12:12

## 2024-08-06 RX ADMIN — DIPHENHYDRAMINE HYDROCHLORIDE 25 MG: 50 INJECTION, SOLUTION INTRAMUSCULAR; INTRAVENOUS at 12:12

## 2024-08-06 RX ADMIN — SODIUM CHLORIDE 1000 ML: 9 INJECTION, SOLUTION INTRAVENOUS at 12:03

## 2024-08-06 RX ADMIN — ONDANSETRON 4 MG: 2 INJECTION INTRAMUSCULAR; INTRAVENOUS at 12:01

## 2024-08-06 ASSESSMENT — COLUMBIA-SUICIDE SEVERITY RATING SCALE - C-SSRS
2. HAVE YOU ACTUALLY HAD ANY THOUGHTS OF KILLING YOURSELF IN THE PAST MONTH?: NO
1. IN THE PAST MONTH, HAVE YOU WISHED YOU WERE DEAD OR WISHED YOU COULD GO TO SLEEP AND NOT WAKE UP?: NO
6. HAVE YOU EVER DONE ANYTHING, STARTED TO DO ANYTHING, OR PREPARED TO DO ANYTHING TO END YOUR LIFE?: NO

## 2024-08-06 ASSESSMENT — ACTIVITIES OF DAILY LIVING (ADL)
ADLS_ACUITY_SCORE: 35

## 2024-08-06 NOTE — ED TRIAGE NOTES
Pt here with c/o N/V. Pt was seen here on Saturday for same thing. Nothing found. Pt endorses abdominal pain and headache. Has been taking zofran and tylenol with no relief.

## 2024-08-06 NOTE — TELEPHONE ENCOUNTER
Nurse Triage SBAR    Is this a 2nd Level Triage? YES, LICENSED PRACTITIONER REVIEW IS REQUIRED    Situation: patient requesting fluids at [facility]    Background: patient was seen in ER for hyperemesis and abdominal pain on 8/3/24    Assessment: Patient reports she vomited 10x yesterday and additional 3-4x this morning. Hyperemesis has been ongoing for 3 days now, was slightly improved after ER visit but progressed again Monday. Producing urine still, small amount and ajay in color. Denies blood in vomit, no diarrhea or fever. Patient reports drinking liquids but not able to keep anything down. Patient reports the zofran and omeprazole has not improved condition.    Patient reports the last time she consumed marijuana gummies was Friday for sleep.     Protocol Recommended Disposition:   Call [facility]/Go to ED/UCC Now (Or To Office with PCP Approval)    Recommendation: Will discuss with [facility] if appropriate or is patient should be seen back in ER.        Does the patient meet one of the following criteria for [facility] visit consideration? 16+ years old, with an [facility] PCP     TIP  Providers, please consider if this condition is appropriate for management at one of our Acute and Diagnostic Services sites.     If patient is a good candidate, please use dotphrase <dot>triageresponse and select Refer to [facility] to document.  Reason for Disposition   SEVERE vomiting (e.g., 6 or more times/day)  (Exception: Patient sounds well, is drinking liquids, does not sound dehydrated, and vomiting has lasted less than 24 hours.)    Additional Information   Negative: Shock suspected (e.g., cold/pale/clammy skin, too weak to stand, low BP, rapid pulse)   Negative: Difficult to awaken or acting confused (e.g., disoriented, slurred speech)   Negative: Sounds like a life-threatening emergency to the triager   Negative: Vomiting red blood or black (coffee ground) material   Negative: Vomiting and hernia is more painful or swollen than usual   Negative: Recent  head injury (within 3 days)   Negative: Recent abdominal injury (within 7 days)   Negative: Insulin-dependent diabetes and glucose > 240 mg/dL (13 mmol/L)   Negative: Severe pain in one eye    Protocols used: Vomiting-A-OH
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
## 2024-08-07 ENCOUNTER — HOSPITAL ENCOUNTER (EMERGENCY)
Facility: CLINIC | Age: 44
Discharge: HOME | End: 2024-08-07
Attending: STUDENT IN AN ORGANIZED HEALTH CARE EDUCATION/TRAINING PROGRAM | Admitting: STUDENT IN AN ORGANIZED HEALTH CARE EDUCATION/TRAINING PROGRAM
Payer: COMMERCIAL

## 2024-08-07 VITALS
TEMPERATURE: 98.3 F | DIASTOLIC BLOOD PRESSURE: 88 MMHG | RESPIRATION RATE: 16 BRPM | HEART RATE: 69 BPM | HEIGHT: 67 IN | BODY MASS INDEX: 22.44 KG/M2 | WEIGHT: 143 LBS | SYSTOLIC BLOOD PRESSURE: 150 MMHG | OXYGEN SATURATION: 99 %

## 2024-08-07 DIAGNOSIS — R11.2 NAUSEA AND VOMITING, UNSPECIFIED VOMITING TYPE: ICD-10-CM

## 2024-08-07 DIAGNOSIS — R10.13 EPIGASTRIC PAIN: ICD-10-CM

## 2024-08-07 DIAGNOSIS — G47.00 INSOMNIA, UNSPECIFIED TYPE: Primary | ICD-10-CM

## 2024-08-07 DIAGNOSIS — E87.6 HYPOKALEMIA: ICD-10-CM

## 2024-08-07 DIAGNOSIS — E87.1 HYPONATREMIA: ICD-10-CM

## 2024-08-07 LAB
ANION GAP SERPL CALCULATED.3IONS-SCNC: 14 MMOL/L (ref 7–15)
ATRIAL RATE - MUSE: 65 BPM
BUN SERPL-MCNC: 5.4 MG/DL (ref 6–20)
CALCIUM SERPL-MCNC: 9.4 MG/DL (ref 8.8–10.4)
CHLORIDE SERPL-SCNC: 90 MMOL/L (ref 98–107)
CREAT SERPL-MCNC: 0.57 MG/DL (ref 0.51–0.95)
DIASTOLIC BLOOD PRESSURE - MUSE: NORMAL MMHG
EGFRCR SERPLBLD CKD-EPI 2021: >90 ML/MIN/1.73M2
GLUCOSE SERPL-MCNC: 119 MG/DL (ref 70–99)
HCO3 SERPL-SCNC: 25 MMOL/L (ref 22–29)
INTERPRETATION ECG - MUSE: NORMAL
MAGNESIUM SERPL-MCNC: 2.2 MG/DL (ref 1.7–2.3)
P AXIS - MUSE: -24 DEGREES
POTASSIUM SERPL-SCNC: 3.1 MMOL/L (ref 3.4–5.3)
PR INTERVAL - MUSE: 116 MS
QRS DURATION - MUSE: 86 MS
QT - MUSE: 436 MS
QTC - MUSE: 453 MS
R AXIS - MUSE: 53 DEGREES
SODIUM SERPL-SCNC: 129 MMOL/L (ref 135–145)
SYSTOLIC BLOOD PRESSURE - MUSE: NORMAL MMHG
T AXIS - MUSE: 29 DEGREES
VENTRICULAR RATE- MUSE: 65 BPM

## 2024-08-07 PROCEDURE — 83735 ASSAY OF MAGNESIUM: CPT | Performed by: STUDENT IN AN ORGANIZED HEALTH CARE EDUCATION/TRAINING PROGRAM

## 2024-08-07 PROCEDURE — 80048 BASIC METABOLIC PNL TOTAL CA: CPT | Performed by: STUDENT IN AN ORGANIZED HEALTH CARE EDUCATION/TRAINING PROGRAM

## 2024-08-07 PROCEDURE — 96374 THER/PROPH/DIAG INJ IV PUSH: CPT

## 2024-08-07 PROCEDURE — 258N000003 HC RX IP 258 OP 636: Performed by: STUDENT IN AN ORGANIZED HEALTH CARE EDUCATION/TRAINING PROGRAM

## 2024-08-07 PROCEDURE — 99285 EMERGENCY DEPT VISIT HI MDM: CPT | Mod: 25

## 2024-08-07 PROCEDURE — 250N000013 HC RX MED GY IP 250 OP 250 PS 637: Performed by: STUDENT IN AN ORGANIZED HEALTH CARE EDUCATION/TRAINING PROGRAM

## 2024-08-07 PROCEDURE — 96361 HYDRATE IV INFUSION ADD-ON: CPT

## 2024-08-07 PROCEDURE — 93005 ELECTROCARDIOGRAM TRACING: CPT

## 2024-08-07 PROCEDURE — 250N000011 HC RX IP 250 OP 636: Performed by: STUDENT IN AN ORGANIZED HEALTH CARE EDUCATION/TRAINING PROGRAM

## 2024-08-07 PROCEDURE — 99284 EMERGENCY DEPT VISIT MOD MDM: CPT | Mod: 25

## 2024-08-07 RX ORDER — MAGNESIUM HYDROXIDE/ALUMINUM HYDROXICE/SIMETHICONE 120; 1200; 1200 MG/30ML; MG/30ML; MG/30ML
15 SUSPENSION ORAL ONCE
Status: COMPLETED | OUTPATIENT
Start: 2024-08-07 | End: 2024-08-07

## 2024-08-07 RX ORDER — POTASSIUM CHLORIDE 1.5 G/1.58G
40 POWDER, FOR SOLUTION ORAL ONCE
Status: COMPLETED | OUTPATIENT
Start: 2024-08-07 | End: 2024-08-07

## 2024-08-07 RX ORDER — DROPERIDOL 2.5 MG/ML
2.5 INJECTION, SOLUTION INTRAMUSCULAR; INTRAVENOUS ONCE
Status: COMPLETED | OUTPATIENT
Start: 2024-08-07 | End: 2024-08-07

## 2024-08-07 RX ADMIN — POTASSIUM CHLORIDE 40 MEQ: 1.5 POWDER, FOR SOLUTION ORAL at 08:54

## 2024-08-07 RX ADMIN — DROPERIDOL 2.5 MG: 2.5 INJECTION, SOLUTION INTRAMUSCULAR; INTRAVENOUS at 08:09

## 2024-08-07 RX ADMIN — SODIUM CHLORIDE 1000 ML: 9 INJECTION, SOLUTION INTRAVENOUS at 08:09

## 2024-08-07 RX ADMIN — ALUMINUM HYDROXIDE, MAGNESIUM HYDROXIDE, AND SIMETHICONE 15 ML: 1200; 120; 1200 SUSPENSION ORAL at 07:35

## 2024-08-07 ASSESSMENT — COLUMBIA-SUICIDE SEVERITY RATING SCALE - C-SSRS
6. HAVE YOU EVER DONE ANYTHING, STARTED TO DO ANYTHING, OR PREPARED TO DO ANYTHING TO END YOUR LIFE?: NO
2. HAVE YOU ACTUALLY HAD ANY THOUGHTS OF KILLING YOURSELF IN THE PAST MONTH?: NO
1. IN THE PAST MONTH, HAVE YOU WISHED YOU WERE DEAD OR WISHED YOU COULD GO TO SLEEP AND NOT WAKE UP?: NO

## 2024-08-07 ASSESSMENT — ACTIVITIES OF DAILY LIVING (ADL)
ADLS_ACUITY_SCORE: 35

## 2024-08-07 NOTE — ED PROVIDER NOTES
Emergency Department Note      History of Present Illness     Chief Complaint   Nausea and Insomnia    HPI   Humera Lovell is a pleasant 43 year old female presenting with nausea and insomnia. For the past four days, patient says that she has been unable to sleep. She has history of insomnia and has been taking Ambien and Trazodone which has not been helping. In addition, she says that she has not eaten for the past three days due to nausea and epigastric abdominal pain. Patient has history of gastritis and has been taking Omeprazole but says that she has been vomiting it up. The last time that she vomited was 11:00pm last night and there was no blood. Patient has been able to keep some fluids down. She says that she has had similar nausea before. She was seen in the ED last night and was given Benadryl, Zofran, and Reglan and started on Phenergan but says that her son has not been able to pick this up from the pharmacy for her yet. No fever, chills, diarrhea, or constipation.     Independent Historian   None    Review of External Notes   I personally reviewed notes from the patient's  endoscopy note  dated  5/21/24 . This provided me with information regarding patient's baseline medical problems. Endoscopy showed esophageal mucosal changes suspicious for short segment Espinosa's esophagus, normal stomach, normal examined duodenum.  Previous history of H. pylori.    I personally reviewed the patient's chart, including available medication list and available past medical history, past surgical history, family history, and social history.    Physical Exam     Patient Vitals for the past 24 hrs:   BP Temp Temp src Pulse Resp SpO2 Height Weight   08/07/24 0845 (!) 156/93 -- -- 62 13 100 % -- --   08/07/24 0830 (!) 158/84 -- -- 64 16 100 % -- --   08/07/24 0815 (!) 152/100 -- -- 73 -- 100 % -- --   08/07/24 0800 (!) 154/76 -- -- 67 16 -- -- --   08/07/24 0626 (!) 172/104 98.3  F (36.8  C) Temporal 81 18 99 % 1.702 m (5'  "7\") 64.9 kg (143 lb)      Physical Exam  Vitals and nursing note reviewed.   Constitutional:       Appearance: Normal appearance. She is not diaphoretic.      Comments: Fatigued appearing   HENT:      Mouth/Throat:      Mouth: Mucous membranes are moist.   Eyes:      General: No scleral icterus.     Conjunctiva/sclera: Conjunctivae normal.   Cardiovascular:      Rate and Rhythm: Normal rate.   Pulmonary:      Effort: Pulmonary effort is normal.   Abdominal:      General: Abdomen is flat.      Palpations: Abdomen is soft.      Tenderness: There is no abdominal tenderness. There is no guarding.   Skin:     General: Skin is warm and dry.      Findings: No rash.   Neurological:      Mental Status: She is alert.            Diagnostics     Lab Results   Labs Ordered and Resulted from Time of ED Arrival to Time of ED Departure   BASIC METABOLIC PANEL - Abnormal       Result Value    Sodium 129 (*)     Potassium 3.1 (*)     Chloride 90 (*)     Carbon Dioxide (CO2) 25      Anion Gap 14      Urea Nitrogen 5.4 (*)     Creatinine 0.57      GFR Estimate >90      Calcium 9.4      Glucose 119 (*)    MAGNESIUM - Normal    Magnesium 2.2       Imaging   No orders to display     EKG  ECG results from 08/07/24   EKG 12-lead, tracing only     Value    Systolic Blood Pressure     Diastolic Blood Pressure     Ventricular Rate 65    Atrial Rate 65    ND Interval 116    QRS Duration 86        QTc 453    P Axis -24    R AXIS 53    T Axis 29    Interpretation ECG      Sinus rhythm  Minimal voltage criteria for LVH, may be normal variant ( Sokolow-Oh )  Borderline ECG       Independent Interpretation   See ED Course below    ED Course      Medications Administered   Medications   sodium chloride 0.9% BOLUS 1,000 mL (1,000 mLs Intravenous $New Bag 8/7/24 0809)   droPERidol (INAPSINE) injection 2.5 mg (2.5 mg Intravenous $Given 8/7/24 0809)   alum & mag hydroxide-simethicone (MAALOX) suspension 15 mL (15 mLs Oral $Given 8/7/24 0735) "   potassium chloride (KLOR-CON) Packet 40 mEq (40 mEq Oral $Given 8/7/24 0879)     Procedures    None performed    Discussion of Management   See ED Course below    Social Determinants of Health adding to complexity of care   None.      ED Course   Independent Interpretation / Discussion of Management / Repeat Assessments  ED Course as of 08/07/24 0915   Wed Aug 07, 2024   0639 I evaluated the patient, obtained history, and performed a physical exam as detailed above.    0909 I rechecked on the patient and explained the plan for discharge. They are comfortable with this plan.      Medical Decision Making / Diagnosis     CMS Diagnoses: None    MIPS   None    MDM   Patient presenting with nausea and insomnia.  Vital signs notable for elevated blood pressure but otherwise reassuring.  This is the patient's third presentation to the emergency department in the last 5 days.  In the last week, she has had extensive testing, including negative pregnancy, negative COVID, normal lipase and hepatic labs, unremarkable CT.  She does have a history of gastritis.  She also has a history of insomnia.  Patient did have mild hyponatremia yesterday and elevated anion gap metabolic acidosis likely starvation ketoacidosis.  Given extensive workup, with no real change in symptoms, do not feel that large repeat workup is indicated.  Will recheck electrolytes given abnormalities yesterday, treated with fluids, GI cocktail, droperidol for nausea and insomnia, reevaluation.  Labs notable for mild hyponatremia, improved from yesterday, along with mild hypokalemia.  I gave the patient potassium.  Patient was able to sleep and nausea resolved after droperidol.  She felt comfortable being discharged.  I recommended she follow-up with her GI doctor and continue to take antiemetics and sleep medicine as prescribed.  Return precautions provided.     Disposition   The patient was discharged.     Diagnosis     ICD-10-CM    1. Insomnia, unspecified  type  G47.00       2. Nausea and vomiting, unspecified vomiting type  R11.2       3. Epigastric pain  R10.13          Discharge Medications   New Prescriptions    No medications on file        José Miguel Rowley MD  08/07/24 0926

## 2024-08-07 NOTE — DISCHARGE INSTRUCTIONS
Continue taking the medications you have been prescribed for nausea and sleep as needed.  Follow-up with your GI doctor.

## 2024-08-07 NOTE — ED TRIAGE NOTES
Pt arrives ambulatory for vomiting for three days and nausea. Pt describes being unable to sleep for three days. Last vomit episode was 11pm. Describes nausea/vomiting has improved but is sleep deprived and now has headache 10/10 and confusion. Ambien and benadryl and trazodone not helping yesterday.

## 2024-08-09 ENCOUNTER — OFFICE VISIT (OUTPATIENT)
Dept: INTERNAL MEDICINE | Facility: CLINIC | Age: 44
End: 2024-08-09
Payer: COMMERCIAL

## 2024-08-09 ENCOUNTER — OFFICE VISIT (OUTPATIENT)
Dept: PEDIATRICS | Facility: CLINIC | Age: 44
End: 2024-08-09
Payer: COMMERCIAL

## 2024-08-09 ENCOUNTER — HOSPITAL ENCOUNTER (OUTPATIENT)
Dept: CT IMAGING | Facility: CLINIC | Age: 44
Discharge: HOME | End: 2024-08-09
Attending: INTERNAL MEDICINE | Admitting: INTERNAL MEDICINE
Payer: COMMERCIAL

## 2024-08-09 VITALS
OXYGEN SATURATION: 100 % | DIASTOLIC BLOOD PRESSURE: 66 MMHG | WEIGHT: 139 LBS | HEART RATE: 89 BPM | SYSTOLIC BLOOD PRESSURE: 100 MMHG | RESPIRATION RATE: 12 BRPM | BODY MASS INDEX: 21.82 KG/M2 | HEIGHT: 67 IN | TEMPERATURE: 98.6 F

## 2024-08-09 VITALS
OXYGEN SATURATION: 100 % | DIASTOLIC BLOOD PRESSURE: 72 MMHG | HEART RATE: 86 BPM | WEIGHT: 139 LBS | TEMPERATURE: 98 F | BODY MASS INDEX: 21.77 KG/M2 | SYSTOLIC BLOOD PRESSURE: 101 MMHG

## 2024-08-09 DIAGNOSIS — E87.6 HYPOKALEMIA: ICD-10-CM

## 2024-08-09 DIAGNOSIS — G47.00 INSOMNIA, UNSPECIFIED TYPE: ICD-10-CM

## 2024-08-09 DIAGNOSIS — S06.9X1A TRAUMATIC BRAIN INJURY, WITH LOSS OF CONSCIOUSNESS OF 30 MINUTES OR LESS, INITIAL ENCOUNTER (H): Primary | ICD-10-CM

## 2024-08-09 DIAGNOSIS — E87.1 SERUM SODIUM DECREASED: ICD-10-CM

## 2024-08-09 DIAGNOSIS — R10.13 EPIGASTRIC PAIN: Primary | ICD-10-CM

## 2024-08-09 DIAGNOSIS — R11.0 NAUSEA: ICD-10-CM

## 2024-08-09 DIAGNOSIS — E87.6 LOW BLOOD POTASSIUM: ICD-10-CM

## 2024-08-09 DIAGNOSIS — E87.1 HYPONATREMIA: ICD-10-CM

## 2024-08-09 DIAGNOSIS — E86.0 DEHYDRATION: ICD-10-CM

## 2024-08-09 DIAGNOSIS — D64.9 ANEMIA, UNSPECIFIED TYPE: ICD-10-CM

## 2024-08-09 DIAGNOSIS — E55.9 VITAMIN D DEFICIENCY: ICD-10-CM

## 2024-08-09 DIAGNOSIS — S06.9X1A TRAUMATIC BRAIN INJURY, WITH LOSS OF CONSCIOUSNESS OF 30 MINUTES OR LESS, INITIAL ENCOUNTER (H): ICD-10-CM

## 2024-08-09 LAB
ANION GAP SERPL CALCULATED.3IONS-SCNC: 14 MMOL/L (ref 7–15)
BASOPHILS # BLD AUTO: 0 10E3/UL (ref 0–0.2)
BASOPHILS NFR BLD AUTO: 1 %
BUN SERPL-MCNC: 10.3 MG/DL (ref 6–20)
CALCIUM SERPL-MCNC: 10.2 MG/DL (ref 8.8–10.4)
CHLORIDE SERPL-SCNC: 93 MMOL/L (ref 98–107)
CREAT SERPL-MCNC: 0.75 MG/DL (ref 0.51–0.95)
EGFRCR SERPLBLD CKD-EPI 2021: >90 ML/MIN/1.73M2
EOSINOPHIL # BLD AUTO: 0.1 10E3/UL (ref 0–0.7)
EOSINOPHIL NFR BLD AUTO: 1 %
ERYTHROCYTE [DISTWIDTH] IN BLOOD BY AUTOMATED COUNT: 10.9 % (ref 10–15)
FERRITIN SERPL-MCNC: 88 NG/ML (ref 6–175)
FOLATE SERPL-MCNC: 18.5 NG/ML (ref 4.6–34.8)
GLUCOSE SERPL-MCNC: 128 MG/DL (ref 70–99)
HCO3 SERPL-SCNC: 25 MMOL/L (ref 22–29)
HCT VFR BLD AUTO: 38.2 % (ref 35–47)
HGB BLD-MCNC: 13.8 G/DL (ref 11.7–15.7)
IMM GRANULOCYTES # BLD: 0 10E3/UL
IMM GRANULOCYTES NFR BLD: 0 %
IRON BINDING CAPACITY (ROCHE): 299 UG/DL (ref 240–430)
IRON SATN MFR SERPL: 28 % (ref 15–46)
IRON SERPL-MCNC: 83 UG/DL (ref 37–145)
LYMPHOCYTES # BLD AUTO: 2.2 10E3/UL (ref 0.8–5.3)
LYMPHOCYTES NFR BLD AUTO: 41 %
MCH RBC QN AUTO: 35.2 PG (ref 26.5–33)
MCHC RBC AUTO-ENTMCNC: 36.1 G/DL (ref 31.5–36.5)
MCV RBC AUTO: 97 FL (ref 78–100)
MONOCYTES # BLD AUTO: 0.6 10E3/UL (ref 0–1.3)
MONOCYTES NFR BLD AUTO: 11 %
NEUTROPHILS # BLD AUTO: 2.4 10E3/UL (ref 1.6–8.3)
NEUTROPHILS NFR BLD AUTO: 45 %
NRBC # BLD AUTO: 0 10E3/UL
NRBC BLD AUTO-RTO: 0 /100
PLATELET # BLD AUTO: 300 10E3/UL (ref 150–450)
POTASSIUM SERPL-SCNC: 3.2 MMOL/L (ref 3.4–5.3)
RBC # BLD AUTO: 3.92 10E6/UL (ref 3.8–5.2)
SODIUM SERPL-SCNC: 132 MMOL/L (ref 135–145)
VIT B12 SERPL-MCNC: 896 PG/ML (ref 232–1245)
VIT D+METAB SERPL-MCNC: 32 NG/ML (ref 20–50)
WBC # BLD AUTO: 5.4 10E3/UL (ref 4–11)

## 2024-08-09 PROCEDURE — 82607 VITAMIN B-12: CPT | Performed by: INTERNAL MEDICINE

## 2024-08-09 PROCEDURE — 80048 BASIC METABOLIC PNL TOTAL CA: CPT | Performed by: INTERNAL MEDICINE

## 2024-08-09 PROCEDURE — 82746 ASSAY OF FOLIC ACID SERUM: CPT | Performed by: INTERNAL MEDICINE

## 2024-08-09 PROCEDURE — 85025 COMPLETE CBC W/AUTO DIFF WBC: CPT | Performed by: INTERNAL MEDICINE

## 2024-08-09 PROCEDURE — 70450 CT HEAD/BRAIN W/O DYE: CPT

## 2024-08-09 PROCEDURE — 82306 VITAMIN D 25 HYDROXY: CPT | Performed by: INTERNAL MEDICINE

## 2024-08-09 PROCEDURE — 96361 HYDRATE IV INFUSION ADD-ON: CPT | Performed by: INTERNAL MEDICINE

## 2024-08-09 PROCEDURE — 82728 ASSAY OF FERRITIN: CPT | Performed by: INTERNAL MEDICINE

## 2024-08-09 PROCEDURE — 83540 ASSAY OF IRON: CPT | Performed by: INTERNAL MEDICINE

## 2024-08-09 PROCEDURE — 36415 COLL VENOUS BLD VENIPUNCTURE: CPT | Performed by: INTERNAL MEDICINE

## 2024-08-09 PROCEDURE — 83550 IRON BINDING TEST: CPT | Performed by: INTERNAL MEDICINE

## 2024-08-09 PROCEDURE — 99215 OFFICE O/P EST HI 40 MIN: CPT | Mod: 25 | Performed by: INTERNAL MEDICINE

## 2024-08-09 PROCEDURE — 96360 HYDRATION IV INFUSION INIT: CPT | Performed by: INTERNAL MEDICINE

## 2024-08-09 PROCEDURE — 99207 REFERRAL TO ACUTE AND DIAGNOSTIC SERVICES: CPT | Performed by: NURSE PRACTITIONER

## 2024-08-09 RX ORDER — POTASSIUM CHLORIDE 1500 MG/1
40 TABLET, EXTENDED RELEASE ORAL ONCE
Status: COMPLETED | OUTPATIENT
Start: 2024-08-09 | End: 2024-08-09

## 2024-08-09 RX ORDER — ZOLPIDEM TARTRATE 5 MG/1
5 TABLET ORAL
Qty: 5 TABLET | Refills: 0 | Status: SHIPPED | OUTPATIENT
Start: 2024-08-09 | End: 2024-08-14

## 2024-08-09 RX ADMIN — Medication 1000 ML: at 14:36

## 2024-08-09 RX ADMIN — POTASSIUM CHLORIDE 20 MEQ: 1500 TABLET, EXTENDED RELEASE ORAL at 15:01

## 2024-08-09 ASSESSMENT — PATIENT HEALTH QUESTIONNAIRE - PHQ9
SUM OF ALL RESPONSES TO PHQ QUESTIONS 1-9: 12
SUM OF ALL RESPONSES TO PHQ QUESTIONS 1-9: 12
10. IF YOU CHECKED OFF ANY PROBLEMS, HOW DIFFICULT HAVE THESE PROBLEMS MADE IT FOR YOU TO DO YOUR WORK, TAKE CARE OF THINGS AT HOME, OR GET ALONG WITH OTHER PEOPLE: VERY DIFFICULT

## 2024-08-09 NOTE — PROGRESS NOTES
"[facility]ute [facility] Services Clinic Visit    Assessment & Plan     Traumatic brain injury, with loss of consciousness of 30 minutes or less, initial encounter (H)  No evidence of bleed or fracture on CT. Recommend tylenol and rest    - CT Head w/o Contrast; Future  - sodium chloride (PF) 0.9% PF flush 3 mL    Hyponatremia  Secondary to dehydration    - Basic metabolic panel; Future  - Basic metabolic panel  - sodium chloride (PF) 0.9% PF flush 3 mL    Dehydration  Secondary to recent vomiting. 1L NS today    - sodium chloride 0.9% BOLUS 1,000 mL  - sodium chloride (PF) 0.9% PF flush 3 mL        Hypokalemia  Replaced today  - potassium chloride kirill ER (KLOR-CON M20) CR tablet 40 mEq    Insomnia, unspecified type  Ongoing issue. Small supply of ambien provided today    - zolpidem (AMBIEN) 5 MG tablet; Take 1 tablet (5 mg) by mouth nightly as needed for sleep    40 minutes were spent doing chart review, history and exam, documentation and further activities per the note.            Return in about 2 weeks (around 8/23/2024) for follow up with PCP.    Shandra Grubbs is a 43 year old, presenting for the following health issues:  No chief complaint on file.    HPI       Fall two days ago she was rising while in the bathtub. She doesn't remember fallin but did hit her head on the left side.     Her n/v has been improving. Vomited four times yesterday only with trying to take fluids. Today the nausea is much improved. She has some lingering epigastric pain.    She has had two ED visits. She also had home based IVF two days ago.    Nausea/Vomiting/Dehydration & pt fell on Wednesday in bathtub, and states she hit her head and is now having headaches. Pt thinks she lost consciousness. Also c/o dizziness  Onset/Duration: 8/3       Nausea: YES       Vomiting:  YES, last time was this time \"Small amount\"       How many times in the last 24 hours:  1       What is the appearance:  Yellow/white       Any blood present: no     "    Diarrhea: no        Constipation: no        Melena/dark stools: no        What has oral intake been like: very little       Abdominal pain: YES- Epigastric       Character/location/radiation:  Sharp, epigastric area  Risks       Recent travel: no        Exposures to ill contacts: no        Recent antibiotics past 2 months: YES- Took treatment for H. Pylori in July       Other: Headaches  Progression of symptoms: worsening  Accompanying signs and symptoms:       Fever/chills: no        Gas/bloating: YES       Dysuria or hematuria: no        Other symptoms: No  Therapies tried and outcome: Omeprazole & Zofran            Objective    There were no vitals taken for this visit.  There is no height or weight on file to calculate BMI.  Physical Exam   GENERAL: alert and no distress. Skull with small raised bump on left anterior area  RESP: lungs clear to auscultation - no rales, rhonchi or wheezes  CV: regular rate and rhythm, normal S1 S2, no S3 or S4, no murmur, click or rub, no peripheral edema  MS: no gross musculoskeletal defects noted, no edema            Signed Electronically by: Lluvia Carpenter MD

## 2024-08-09 NOTE — PROGRESS NOTES
Assessment & Plan     Epigastric pain  Has some but it is decreasing over time    Nausea  Nausea is improved and she is able to drink Gatorade and Ensure but she still not eating  Getting some IV fluids may help    Insomnia, unspecified type  She has tried many things for her insomnia and has not been able to sleep  We will see if the IV fluids etc. will help    Sodium and potassium low  Sent to [facility] and they will do labs and give her IV fluids  She is now able to drink Gatorade and some Ensure so she may be better on her labs      MED REC REQUIRED  Post Medication Reconciliation Status:  Discharge medications reconciled, continue medications without change    There are no Patient Instructions on file for this visit.    Shandra Grubbs is a 43 year old, presenting for the following health issues:  ER F/U        8/9/2024    12:15 PM   Additional Questions   Roomed by Melissa STUART     History of Present Illness       Reason for visit:  GI    She eats 2-3 servings of fruits and vegetables daily.She consumes 1 sweetened beverage(s) daily.She exercises with enough effort to increase her heart rate 30 to 60 minutes per day.  She exercises with enough effort to increase her heart rate 4 days per week.   She is taking medications regularly.         ED/UC Followup:    Facility:  [facility]  Date of visit: 8-7-24  Reason for visit: insomnia, nausea,vomiting  Current Status: pt fell on Wednesday and states she hit her head and is now having headaches. Pt thinks she lost consciousness.  She had a CT scan day prior to fall     Feeling dizzy since then   Has not thrown up since last night - omeprazole and zofran 8/3/2024  Trying to take in ENsure  and gatorade     Saturday exercise - stomach pain - then vomiting   Went to ER - did CT scan   Saturday and Sunday was ok and able to eat     Monday called for sleep aide 8/5/2024  Started throwing up again  Stabilizing her in ER      Private IV and magnesium and benadryl   Did not help  "    Promethazine help her some     Belly comfort may be helping     Not worked all week     Zpeg quramirez, benadryl, ambien, trazodone for sleep     She would like to get IV fluids and check lab and possibly get CT scan    Called [facility] and they accepted her to give IV fluids and check lab and will determine CT need    Review of Systems  Constitutional, neuro, ENT, endocrine, pulmonary, cardiac, gastrointestinal, genitourinary, musculoskeletal, integument and psychiatric systems are negative, except as otherwise noted.      Objective    /66   Pulse 89   Temp 98.6  F (37  C) (Oral)   Resp 12   Ht 1.702 m (5' 7\")   Wt 63 kg (139 lb)   SpO2 100%   BMI 21.77 kg/m    Body mass index is 21.77 kg/m .  Physical Exam   GENERAL: alert and tired   RESP: lungs clear   CV: regular rate and rhythm  MS: no gross musculoskeletal defects noted  PSYCH: mentation appears normal, affect tired    Reviewed outside labs from the ER etc.        Signed Electronically by: CHONG Abdalla CNP    .undefined[^^  "

## 2024-08-09 NOTE — PATIENT INSTRUCTIONS
Transferred to [facility] for IV fluids and for lab recheck, and possible CT scan if they agree to do that

## 2024-08-09 NOTE — PATIENT INSTRUCTIONS
Your tests today were normal aside from the low potassium. Your Sodium is slightly better.     Keep working to eat and drink frequently throughout the day. Use tylenol as needed for headaches.

## 2024-08-14 DIAGNOSIS — E87.6 LOW SERUM POTASSIUM: ICD-10-CM

## 2024-08-14 DIAGNOSIS — G47.00 INSOMNIA, UNSPECIFIED TYPE: ICD-10-CM

## 2024-08-14 DIAGNOSIS — R79.89 LOW SERUM SODIUM: Primary | ICD-10-CM

## 2024-08-14 RX ORDER — ZOLPIDEM TARTRATE 5 MG/1
5 TABLET ORAL
Qty: 30 TABLET | Refills: 0 | Status: SHIPPED | OUTPATIENT
Start: 2024-08-14 | End: 2024-08-15

## 2024-08-14 NOTE — TELEPHONE ENCOUNTER
Lab were acceptable   Sodium low   But improved from previous   Potassium low - they replaced in [facility]  Other lab ok    Suggest repeat lab early next week     Ambien refill sent

## 2024-08-14 NOTE — TELEPHONE ENCOUNTER
Patient is calling for two reasons.  She would like a refill of ambien that she originally got from our [facility].  She also has not heard about any of her lab results.

## 2024-08-15 ENCOUNTER — TELEPHONE (OUTPATIENT)
Dept: INTERNAL MEDICINE | Facility: CLINIC | Age: 44
End: 2024-08-15
Payer: COMMERCIAL

## 2024-08-15 RX ORDER — ZOLPIDEM TARTRATE 5 MG/1
5 TABLET ORAL
Qty: 30 TABLET | Refills: 0 | Status: SHIPPED | OUTPATIENT
Start: 2024-08-15

## 2024-08-15 RX ORDER — ZOLPIDEM TARTRATE 5 MG/1
5 TABLET ORAL
Qty: 30 TABLET | Refills: 0 | Status: CANCELLED | OUTPATIENT
Start: 2024-08-15

## 2024-08-15 NOTE — TELEPHONE ENCOUNTER
I am sorry she felt not taken care of    I was on vacation and responded to her request when it was sent   I cannot control the transfer to pharmacy     But, again as soon as I saw the request that it was not received by pharmacy, it was sent again    I am not sure what more could have been done

## 2024-08-15 NOTE — TELEPHONE ENCOUNTER
This writer called the pharmacy who stated they did not received the script for ambien. RN, can you assist? And also call patient to let her know? Thank you    Patient can be reached at [phone]

## 2024-08-15 NOTE — TELEPHONE ENCOUNTER
Patient calls, is very upset that her Ambien has not been filled.  Informed patient that the prescription was signed today at 12:05 pm. She tells me her pharmacy has not received the prescription.  Writer call patient's pharmacy, they have the prescription, and are in the process of filling it.   Writer calls patient back and informed her to give the pharmacy an hour or so and they will have the prescription ready.

## 2024-08-15 NOTE — TELEPHONE ENCOUNTER
Patient calls back and states she had a real bad time yesterday sleeping and is out of medication. Please refill as soon as possible for her she requests.    Patient was advised next time to request refill prior to being out of medication.

## 2024-08-15 NOTE — TELEPHONE ENCOUNTER
Patient calls back and was very upset. Patient states she was routed and re-routed during her last call and ended up speaking with Javier at [facility] [place]. This writer not sure what the content of their conversation was. Patient states she called the pharmacy and the prescription was still not there. Called pharmacy and while we were waiting for pharmacist to  patient told me I could hang up.  Patient states that Javier called her back to confirm her prescription was at the pharmacy and ready for her to .    Patient wanted me to put in a note that during this exchange she did not feel she was shown compassion or empathy for her situation. Patient knows she waited too long to request a refill but she feels there was no follow through on [place]'s side of it.

## 2024-08-20 ENCOUNTER — TELEPHONE (OUTPATIENT)
Dept: INTERNAL MEDICINE | Facility: CLINIC | Age: 44
End: 2024-08-20
Payer: COMMERCIAL

## 2024-08-20 NOTE — TELEPHONE ENCOUNTER
Patient is calling to ask about her recent lab results and what the next steps are. She said she is doing a little better but she is still tired and still having headaches. She had an appointment with [facility] as well as [facility] but she feels like everything was dropped from there and she is feeling frustrated not knowing all of her results and what they mean and what she should do next.

## 2024-08-20 NOTE — TELEPHONE ENCOUNTER
"Writer called out to patient and apologized that lab results were not relayed to patient. Per 8/14/24 refill encounter, PCP noted \"Lab were acceptable   Sodium low   But improved from previous   Potassium low - they replaced in [facility]  Other lab ok. Suggest repeat lab early next week \"     Writer relayed this information to patient.     Patient reports that she feels that the clinic has not been giving the care she deserves. Not only related to the delayed prescription and lack of follow-up on recent labs. Patient still suffering from headaches and there has been no other follow-up on this matter. Patient is frustrated as she feels she has to advocate so much for herself. Writer apologized patient was feeling this way and again apologized in the delay regarding the lab follow-up. Writer inquired if patient follows neurology at all for post concussion symptoms, patient reports she does not explaining, \"how can I, if my care team does not refer me?\" Writer asked if patient would like to ask if PCP thought appropriate. Patient declined, reports that she no longer wants to receive her care through this clinic and will find an alternative clinic/provider to receive her care through. Patient additionally declined following up with labs.     Summer RN 2:57 PM August 20, 2024   [facility]    "

## 2024-10-15 ENCOUNTER — TELEPHONE (OUTPATIENT)
Dept: PHARMACY | Facility: CLINIC | Age: 44
End: 2024-10-15
Payer: COMMERCIAL

## 2024-10-15 NOTE — TELEPHONE ENCOUNTER
Pt is due for labs    Call placed to pt to remind them to go to a [facility] lab to complete the testing/get supplies needed    Outcome: Pt acknowledged the need and agreed to get it done.

## 2024-12-03 ENCOUNTER — PATIENT OUTREACH (OUTPATIENT)
Dept: CARE COORDINATION | Facility: CLINIC | Age: 44
End: 2024-12-03
Payer: COMMERCIAL

## 2024-12-11 ENCOUNTER — PATIENT OUTREACH (OUTPATIENT)
Dept: INTERNAL MEDICINE | Facility: CLINIC | Age: 44
End: 2024-12-11
Payer: COMMERCIAL

## 2024-12-17 ENCOUNTER — PATIENT OUTREACH (OUTPATIENT)
Dept: CARE COORDINATION | Facility: CLINIC | Age: 44
End: 2024-12-17
Payer: COMMERCIAL

## 2024-12-19 ENCOUNTER — PATIENT OUTREACH (OUTPATIENT)
Dept: CARE COORDINATION | Facility: CLINIC | Age: 44
End: 2024-12-19
Payer: COMMERCIAL

## 2025-01-06 PROBLEM — R87.810 CERVICAL HIGH RISK HPV (HUMAN PAPILLOMAVIRUS) TEST POSITIVE: Status: ACTIVE | Noted: 2018-06-03

## 2025-02-01 ENCOUNTER — HEALTH MAINTENANCE LETTER (OUTPATIENT)
Age: 45
End: 2025-02-01

## 2025-02-22 NOTE — TELEPHONE ENCOUNTER
"Patient calling.  She has been to the ER several times in the last couple weeks for vomiting.  Reports she \"is stabilized in the ER and then sent home.  And then she starts vomiting again\".  States her chest hurts from vomiting.  Medications not helping with the vomiting.  Wants to find out why she is vomiting.    Asking for a follow up appointment today.    Advised no appointments available today but will take a message to see if patient can be worked into schedule or ask if ok use approval req'd spot (for Dr. Mireles 6/20/23).  But patient said \" I cannot wait, and it's ok\" then hung up.    Attempted to call patient back but no answer.  " 1 or 2

## (undated) DEVICE — INTRO GLIDESHEATH SLENDER 6FR 10X45CM 60-1060

## (undated) DEVICE — CATH DIAGNOSTIC RADIAL 5FR TIG 4.0

## (undated) DEVICE — DEFIB PRO-PADZ LVP LQD GEL ADULT 8900-2105-01

## (undated) DEVICE — MANIFOLD KIT ANGIO AUTOMATED 014613

## (undated) DEVICE — PREP CHLORAPREP 26ML TINTED ORANGE  260815

## (undated) DEVICE — KIT HAND CONTROL ANGIOTOUCH ACIST 65CM AT-P65

## (undated) DEVICE — PAD CHUX UNDERPAD 23X24" 7136

## (undated) DEVICE — GUIDEWIRE VASC 0.035INX150CM INQWIRE J TIP IQ35F150J3F/A

## (undated) DEVICE — KIT ENDO FIRST STEP DISINFECTANT 200ML W/POUCH EP-4

## (undated) DEVICE — SLEEVE TR BAND RADIAL COMPRESSION DEVICE 24CM TRB24-REG

## (undated) RX ORDER — ONDANSETRON 2 MG/ML
INJECTION INTRAMUSCULAR; INTRAVENOUS
Status: DISPENSED
Start: 2022-11-12

## (undated) RX ORDER — DIAZEPAM 5 MG
TABLET ORAL
Status: DISPENSED
Start: 2022-05-03

## (undated) RX ORDER — DIPHENHYDRAMINE HYDROCHLORIDE 50 MG/ML
INJECTION INTRAMUSCULAR; INTRAVENOUS
Status: DISPENSED
Start: 2024-05-21

## (undated) RX ORDER — FENTANYL CITRATE 50 UG/ML
INJECTION, SOLUTION INTRAMUSCULAR; INTRAVENOUS
Status: DISPENSED
Start: 2024-05-21